# Patient Record
Sex: FEMALE | Race: WHITE | NOT HISPANIC OR LATINO | Employment: FULL TIME | URBAN - METROPOLITAN AREA
[De-identification: names, ages, dates, MRNs, and addresses within clinical notes are randomized per-mention and may not be internally consistent; named-entity substitution may affect disease eponyms.]

---

## 2017-02-13 ENCOUNTER — GENERIC CONVERSION - ENCOUNTER (OUTPATIENT)
Dept: OTHER | Facility: OTHER | Age: 52
End: 2017-02-13

## 2017-02-13 ENCOUNTER — HOSPITAL ENCOUNTER (OUTPATIENT)
Dept: NON INVASIVE DIAGNOSTICS | Facility: CLINIC | Age: 52
Discharge: HOME | End: 2017-02-13
Payer: OTHER GOVERNMENT

## 2017-02-13 ENCOUNTER — ALLSCRIPTS OFFICE VISIT (OUTPATIENT)
Dept: OTHER | Facility: OTHER | Age: 52
End: 2017-02-13

## 2017-02-13 DIAGNOSIS — I35.0 NODULAR CALCIFIC AORTIC VALVE STENOSIS: ICD-10-CM

## 2017-02-13 DIAGNOSIS — I34.1 J.B. BARLOW'S SYNDROME: ICD-10-CM

## 2017-02-13 DIAGNOSIS — E78.49 OTHER HYPERLIPIDEMIA: ICD-10-CM

## 2017-02-13 PROCEDURE — 93306 TTE W/DOPPLER COMPLETE: CPT

## 2017-08-01 DIAGNOSIS — E78.5 HYPERLIPIDEMIA: ICD-10-CM

## 2017-08-04 ENCOUNTER — GENERIC CONVERSION - ENCOUNTER (OUTPATIENT)
Dept: OTHER | Facility: OTHER | Age: 52
End: 2017-08-04

## 2018-01-05 LAB — INR PPP: 1.9 (ref 0.86–1.16)

## 2018-01-09 NOTE — PROGRESS NOTES
REPORT NAME: Patient Visit Summary Report   VISIT DATE: 7/20/2016  VISIT TIME: 10:05 AM EDT  PATIENT NAME: Florette Meckel   MEDICAL RECORD NUMBER: [id number]  SOCIAL SECURITY NUMBER:   YOB: 1965  AGE: 45  REFERRING PHYSICIAN: Kolton Wilkinson MD  SUPERVISING CLINICIAN: Kolton Wilkinson MD  HEALTH CARE PROFESSIONAL: Darío Adam  PATIENT HOME ADDRESS: [address] [place] [address],[address] [place] [address], [place] [address]  PATIENT HOME PHONE: [phone]  DIAGNOSIS 1: Aortic Valve Replacement / 424 1  DIAGNOSIS 2:   DIAGNOSIS 3:   DIAGNOSIS 4:   INR RANGE: 2 - 3  INR GOAL: 2 5  TREATMENT START DATE: 4/14/2009  TREATMENT END DATE:   NEXT VISIT:     NEXT SELF TEST DATE: 8/3/2016    VISIT RESULTS   ENCOUNTER NUMBER:   TEST LOCATION: Home Testing  TEST TYPE: PT Test ([id number])  VISIT TYPE:   CURRENT INR: 2 4 PROTIME:   SPECIMEN COL AND RPT DATE: 7/20/2016 10:05 AM  EDT    VITAL SIGNS  PULSE:  B/P:  WEIGHT:  HEIGHT:  TEMP:     CURRENT ANTICOAGULANT DOSING SCHEDULE  DOSE SIZE: 5mg    ANTICOAGULANT TYPE: WARFARIN  DOSING REGIMEN  Sun       Mon Tues Wed Thurs Fri       Sat  Total/Wk  5         2 5       5         2 5       2 5       5         2 5       25    PATIENT MEDICATION INSTRUCTION: No  PATIENT NUTRITIONAL COUNSELING: No  PATIENT BRUISING INSTRUCTION: No      LAST EDUCATION DATE:     PST DETAILS  PST PATIENT TEST DATE: 7/20/2016  PST PATIENT SUBMISSION DATE: 7/20/2016 8:18 PM EDT  PST INR: 2 4  NEXT PST TEST DATE: 8/3/2016  ASSESSMENT QUESTIONS AND ANSWERS:  PST COMMENT:       PREVIOUS VISIT INFORMATION  VISITDATE   INR Goal  INR   Sun     Mon Tues Wed Thurs   Fri  Sat     Total/wk  7/20/2016   2 5       2 4   5       2 5     5       2 5     2 5     5  2 5     25  7/20/2016   2 5       2 4   5       2 5     5       2 5     2 5     5  2 5     25  7/5/2016    2 5       2 1   5       2 5     5       2 5     2 5     5  2 5     25  6/17/2016   2 5       2 3   5       2 5     5       2 5     2 5 5  2 5     25    ADDITIONAL PREVIOUS VISIT INFORMATION  VISITDATE   PRIMARY RX               DOSE      CrCl  7/20/2016   WARFARIN                 5mg                 7/20/2016   WARFARIN                 5mg                 7/5/2016    WARFARIN                 5mg                 6/17/2016   WARFARIN                 5mg                     OTHER CURRENT MEDICATIONS: WARFARIN      PROGRESS NOTES: 4/19/80, duplicate  serge  PATIENT INSTRUCTIONS:     TEST LOCATION: Home Testing    Electronically signed by:  Tracie Blair on 7/21/2016 at 10:06 AM EDT

## 2018-01-12 NOTE — RESULT NOTES
Verified Results  (1) PT WITH INR [id number] 07:39AM Sarbjit Julien     Test Name Result Flag Reference   INR 2 7 H    Reference Range                     0 9-1 1  Moderate-intensity Warfarin Therapy 2 0-3 0  Higher-intensity Warfarin Therapy   3 0-4 0   PT 26 8 sec H 9 0-11 5   For more information on this test, go to:  [email or web address]/[email or web address]

## 2018-01-12 NOTE — RESULT NOTES
Verified Results  ECHO COMPLETE WITH CONTRAST IF INDICATED [id number] 08:50AM Bahman Ramirez     Test Name Result Flag Reference   ECHO COMPLETE WITH CONTRAST IF INDICATED (Report)     [address]alo Campos [address]  [place], [address]   [phone]     Transthoracic Echocardiogram   2D, M-mode, Doppler, and Color Doppler     Study date: 2017     Patient: Marisol Becerra   MR number: [id number]   Account number: [de-identified]   : 1965   Age: 45 years   Gender: Female   Status: Outpatient   Location: [facility]   Height: 64 in   Weight: 170 lb   BP: 128/ 70 mmHg     Indications: Aortic valve     Diagnoses: I35 9 - Nonrheumatic aortic valve disorder, unspecified     Sonographer: ANTHONY Emery   Primary Physician[facility]   Referring Physician: Bran Torres MD   Group: [facility]   Interpreting Physician: Bran Torres MD     SUMMARY     LEFT VENTRICLE:   Systolic function was normal  Ejection fraction was estimated to be 55 %  There were no regional wall motion abnormalities  Wall thickness was mildly increased  MITRAL VALVE:   There was mild annular calcification  There was mild regurgitation  AORTIC VALVE:   A bioprosthesis was present  It exhibited normal function  There was trace regurgitation  TRICUSPID VALVE:   There was mild regurgitation  PULMONIC VALVE:   There was mild regurgitation  HISTORY: PRIOR HISTORY: Palpitations, AVR, HLD     PROCEDURE: The study was performed in the [address] [place] [address]  This was a routine study  The transthoracic approach was used  The study included complete 2D imaging, M-mode, complete spectral Doppler, and color Doppler  The   heart rate was 68 bpm, at the start of the study  Images were obtained from the parasternal, apical, subcostal, and suprasternal notch acoustic windows  Image quality was adequate       LEFT VENTRICLE: Size was normal  Systolic function was normal  Ejection fraction was estimated to be 55 %  There were no regional wall motion abnormalities  Wall thickness was mildly increased  DOPPLER: The ratio of early ventricular   filling to atrial contraction velocities was within the normal range  The deceleration time of the early transmitral flow velocity was increased  RIGHT VENTRICLE: The size was normal  Systolic function was normal  Wall thickness was normal      LEFT ATRIUM: Size was at the upper limits of normal      RIGHT ATRIUM: Size was normal      MITRAL VALVE: There was mild annular calcification  DOPPLER: There was no evidence for stenosis  There was mild regurgitation  AORTIC VALVE: A bioprosthesis was present  It exhibited normal function  DOPPLER: There was no evidence for stenosis  There was trace regurgitation  TRICUSPID VALVE: The valve structure was normal  There was normal leaflet separation  DOPPLER: The transtricuspid velocity was within the normal range  There was no evidence for stenosis  There was mild regurgitation  Pulmonary artery   systolic pressure was within the normal range  Estimated peak PA pressure was 18 mmHg  PULMONIC VALVE: Leaflets exhibited normal thickness, no calcification, and normal cuspal separation  DOPPLER: The transpulmonic velocity was within the normal range  There was mild regurgitation  PERICARDIUM: There was no pericardial effusion  The pericardium was normal in appearance  AORTA: The root exhibited upper limit of normal size  MEASUREMENT TABLES     2D MEASUREMENTS   Aorta  (Reference normals)   Root diam  37 mm  (--)     SYSTEM MEASUREMENT TABLES     2D   IVSd: 1 1 cm   LA Diam: 3 6 cm   LVIDd: 4 8 cm   LVIDs: 3 6 cm   LVPWd: 1 1 cm     CW   AV Env  Ti: 318 3 ms   AV VTI: 49 5 cm   AV Vmax: 2 3 m/s   AV Vmean: 1 6 m/s   AV maxP 2 mmHg   AV meanP 1 mmHg   TR Vmax: 1 8 m/s   TR maxP 6 mmHg     PW   TERRI (VTI): 2 1 cm2   TERRI Vmax: 2 cm2 HR: 66 BPM   LVCO Dopp: 6 8 L/min   LVOT Env  Ti: 327 6 ms   LVOT VTI: 18 3 cm   LVOT Vmax: 0 8 m/s   LVOT Vmean: 0 6 m/s   LVOT maxP 8 mmHg   LVOT meanP 5 mmHg   LVSV Dopp: 103 1 ml   MV A Ab: 0 9 m/s   MV Dec [place]: 3 8 m/s2   MV DecT: 307 8 ms   MV E Ab: 1 2 m/s   MV E/A Ratio: 1 3   MV PHT: 89 3 ms   MVA By PHT: 2 5 cm2     [facility] Accredited Ech[facility]ography Laboratory     Prepared and electronically signed by     Natty Gorman MD   Signed [id number] 09:58:12

## 2018-01-15 VITALS
SYSTOLIC BLOOD PRESSURE: 126 MMHG | HEIGHT: 64 IN | BODY MASS INDEX: 28.85 KG/M2 | WEIGHT: 169 LBS | DIASTOLIC BLOOD PRESSURE: 78 MMHG | HEART RATE: 60 BPM

## 2018-01-15 NOTE — RESULT NOTES
Verified Results  (1) LIPID PANEL, FASTING 22Aug2016 11:02AM Johnnie Guerra     Test Name Result Flag Reference   CHOLESTEROL, TOTAL 201 mg/dL H 125-200   HDL CHOLESTEROL 49 mg/dL  > OR = 46   TRIGLICERIDES 920 mg/dL H <150   LDL-CHOLESTEROL 111 mg/dL (calc)  <130   Desirable range <100 mg/dL for patients with CHD or  diabetes and <70 mg/dL for diabetic patients with  known heart disease  CHOL/HDLC RATIO 4 1 (calc)  < OR = 5 0   NON HDL CHOLESTEROL 152 mg/dL (calc)     Target for non-HDL cholesterol is 30 mg/dL higher than   LDL cholesterol target       (1) AST (SGOT) 22Aug2016 11:02AM Johnnie Guerra     Test Name Result Flag Reference   AST 18 U/L  10-35     (Q) CBC (H/H, RBC, INDICES, WBC, PLT) 22Aug2016 11:02AM Johnnie Guerra   REPORT COMMENT:  FASTING:YES     Test Name Result Flag Reference   WHITE BLOOD CELL COUNT 5 3 Thousand/uL  3 8-10 8   RED BLOOD CELL COUNT 4 08 Million/uL  3 80-5 10   HEMOGLOBIN 12 6 g/dL  11 7-15 5   HEMATOCRIT 38 3 %  35 0-45 0   MCV 93 7 fL  80 0-100 0   MCH 30 8 pg  27 0-33 0   MCHC 32 9 g/dL  32 0-36 0   RDW 13 6 %  11 0-15 0   PLATELET COUNT 519 Thousand/uL  140-400   MPV 8 3 fL  7 5-11 5     (Q) CBC (H/H, RBC, INDICES, WBC, PLT) 22Aug2016 11:02AM Johnnie Guerra   REPORT COMMENT:  FASTING:YES     Test Name Result Flag Reference   WHITE BLOOD CELL COUNT 5 3 Thousand/uL  3 8-10 8   RED BLOOD CELL COUNT 4 08 Million/uL  3 80-5 10   HEMOGLOBIN 12 6 g/dL  11 7-15 5   HEMATOCRIT 38 3 %  35 0-45 0   MCV 93 7 fL  80 0-100 0   MCH 30 8 pg  27 0-33 0   MCHC 32 9 g/dL  32 0-36 0   RDW 13 6 %  11 0-15 0   PLATELET COUNT 818 Thousand/uL  140-400   MPV 8 3 fL  7 5-11 5

## 2018-01-15 NOTE — PROGRESS NOTES
REPORT NAME: Progress Notes Report  VISIT DATE: 8/4/2017  VISIT TIME: 2:34 PM EDT  PATIENT NAME: Sue Joy   MEDICAL RECORD NUMBER: [id number]  YOB: 1965  AGE: 45  REFERRING PHYSICIAN: Saskia Watson MD  SUPERVISING CLINICIAN: Saskia Watson MD  HEALTH CARE PROVIDER: Lizbeth Adam  PATIENT HOME ADDRESS: [address] [place] [address],[address], [address]  PATIENT HOME PHONE: [phone]  SOCIAL SECURITY NUMBER:   DIAGNOSIS 1: Aortic Valve Replacement / 424 1  DIAGNOSIS 2:   INR RANGE: 2 - 3  INR GOAL: 2 5  TREATMENT START DATE: 4/14/2009  TREATMENT END DATE:   NEXT VISIT:     NEXT SELF TEST DATE: 8/24/2017  VISIT RESULTS  ENCOUNTER NUMBER:   TEST LOCATION: Home Testing  TEST TYPE: PT Test ([id number])  VISIT TYPE: Phone Consult  CURRENT INR: 2 9 PROTIME:   SPECIMEN COL AND RPT DATE: 8/4/2017 2:34 PM EDT  VITAL SIGNS  PULSE:  BP: / WEIGHT:  HEIGHT:  TEMP:   CURRENT ANTICOAGULANT DOSING SCHEDULE  DOSE SIZE: 5mg    ANTICOAGULANT TYPE: WARFARIN  DOSING REGIMEN  Sun       Mon Tues Wed Thurs Fri       Sat  Total/Wk  5         2 5       5         2 5       2 5       5         2 5       25  PATIENT MEDICATION INSTRUCTION: Yes  PATIENT NUTRITIONAL COUNSELING: No  PATIENT BRUISING INSTRUCTION: Yes  LAST EDUCATION DATE:   PST DETAILS  PST PATIENT TEST DATE: 8/4/2017  PST PATIENT SUBMISSION DATE: 8/4/2017 12:44 PM EDT  PST INR: 2 9  NEXT PST TEST DATE: 8/24/2017  ASSESSMENT QUESTIONS AND ANSWERS  PREVIOUS VISIT INFORMATION  VISITDATE  INRGoal INR   Sun    Mon Tues Wed Thurs  Fri    Sat  Total/wk  8/4/2017    2 5     2 9   5      2 5    5      2 5    2 5    5      2 5  25  7/13/2017   2 5     1 6   5      2 5    5      2 5    5      5      2 5  27 5  5      2 5    5      2 5    2 5    5      2 5  25  6/22/2017   2 5     2 7   5      2 5    5      2 5    2 5    5      2 5  25  5/25/2017   2 5     2 1   5      2 5    5      2 5    2 5    5      2 5  25  ADDITIONAL PREVIOUS VISIT INFORMATION  VISITDATE   PRIMARY RX               DOSE      CrCl  8/4/2017    WARFARIN                 5mg  7/13/2017   WARFARIN                 5mg  6/22/2017   WARFARIN                 5mg  5/25/2017   WARFARIN                 5mg  OTHER CURRENT MEDICATIONS:  WARFARIN  PROGRESS NOTES:   PATIENT INSTRUCTIONS: 8/4/17, tc pt, lm am, cont current dose, 5 mg  sun/t/f, 2 5 mg others, rech 3 weeks, 8/24  serge   ---- Additional Instructions entered on 8/29/2017 2:14 PM EDT by Delvis Blair :  8/29/17, karen pt, received fax from [employer], reminder to report  inr asap  serge  TEST LOCATION: Home Testing, ,           ,             INBOUND LAB DATA:  Lab       Lab Value Col Date                 Rpt Date                 Lab  Reference Range  Electronically signed by:  Delvis Blair on 8/29/2017 2:14 PM EDT

## 2018-01-18 NOTE — RESULT NOTES
Message   Please call the patient let her know that her lumbar x-ray was normal however her left hip x-ray shows that she likely has calcific greater trochanteric bursitis-I would recommend she see [facility] orthopedics for evaluation and possible injection  Verified Results  * XR HIP 2+ VIEW LEFT 21Mar2016 04:05PM Dwight Carty Order Number: [id number]     Test Name Result Flag Reference   * XR HIP/PELV 2-3 VWS LEFT (Report)     LEFT HIP     INDICATION: Left hip pain  COMPARISON: None     VIEWS: AP pelvis and 2 coned down views; 3 images     FINDINGS:     There is no fracture or dislocation  No degenerative changes  No lytic or blastic lesions are seen  Calcification is present in the soft tissues adjacent to the greater trochanter  IMPRESSION:     No acute osseous abnormality  Soft tissue calcification may indicate an element of trochanteric bursitis  Workstation performed: [id number]     Signed by:   Lucita Card MD   3/22/16     * XR SPINE LUMBAR MINIMUM 4 VIEWS [id number] 04:05PM Kerwin VELEZ Order Number: [id number]     Test Name Result Flag Reference   XR SPINE LUMBAR MINIMUM 4 VIEWS (Report)     LUMBAR SPINE     INDICATION: Back pain, hip pain  COMPARISON: September 13, 2012     VIEWS: AP, lateral, bilateral oblique and coned down projections; 5 images     FINDINGS:     Alignment is unremarkable  There is no radiographic evidence of acute fracture or destructive osseous lesion  No significant lumbar degenerative change noted  Atherosclerotic vascular calcifications are noted  Visualized soft tissues appear otherwise unremarkable  IMPRESSION:     No acute bony abnormality         Workstation performed: [id number]     Signed by:   Lucita Card MD   3/22/16       Signatures   Electronically signed by : Kelley Singh, [facility]; Mar 22 2016  5:55PM EST                       (Author)

## 2018-01-19 ENCOUNTER — ANTICOAG VISIT (OUTPATIENT)
Dept: CARDIOLOGY CLINIC | Facility: CLINIC | Age: 53
End: 2018-01-19

## 2018-02-09 ENCOUNTER — ANTICOAG VISIT (OUTPATIENT)
Dept: CARDIOLOGY CLINIC | Facility: CLINIC | Age: 53
End: 2018-02-09

## 2018-02-09 LAB — INR PPP: 2.1 (ref 0.86–1.16)

## 2018-03-06 ENCOUNTER — ANTICOAG VISIT (OUTPATIENT)
Dept: CARDIOLOGY CLINIC | Facility: CLINIC | Age: 53
End: 2018-03-06

## 2018-03-06 LAB — INR PPP: 2.4 (ref 0.86–1.16)

## 2018-03-27 ENCOUNTER — ANTICOAG VISIT (OUTPATIENT)
Dept: CARDIOLOGY CLINIC | Facility: CLINIC | Age: 53
End: 2018-03-27

## 2018-03-27 LAB — INR PPP: 1.8 (ref 0.86–1.16)

## 2018-04-04 RX ORDER — WARFARIN SODIUM 5 MG/1
1 TABLET ORAL DAILY
COMMUNITY
Start: 2017-02-06 | End: 2018-07-08 | Stop reason: SDUPTHER

## 2018-04-04 RX ORDER — ALPRAZOLAM 0.25 MG/1
1 TABLET ORAL DAILY PRN
COMMUNITY
Start: 2015-01-05 | End: 2018-11-03 | Stop reason: SDUPTHER

## 2018-04-04 RX ORDER — LORAZEPAM 0.5 MG/1
TABLET ORAL
COMMUNITY
Start: 2016-11-15 | End: 2018-05-17 | Stop reason: ALTCHOICE

## 2018-04-04 RX ORDER — PRAVASTATIN SODIUM 10 MG
1 TABLET ORAL
COMMUNITY
Start: 2015-01-23 | End: 2018-04-11 | Stop reason: SDUPTHER

## 2018-04-05 LAB
AST SERPL-CCNC: 20 U/L (ref 10–35)
CHOLEST SERPL-MCNC: 199 MG/DL
CHOLEST/HDLC SERPL: 3.6 (CALC)
HDLC SERPL-MCNC: 56 MG/DL
LDLC SERPL CALC-MCNC: 116 MG/DL (CALC)
NONHDLC SERPL-MCNC: 143 MG/DL (CALC)
TRIGL SERPL-MCNC: 152 MG/DL

## 2018-04-06 ENCOUNTER — ANTICOAG VISIT (OUTPATIENT)
Dept: CARDIOLOGY CLINIC | Facility: CLINIC | Age: 53
End: 2018-04-06

## 2018-04-06 LAB — INR PPP: 2.1 (ref 0.86–1.16)

## 2018-04-10 ENCOUNTER — OFFICE VISIT (OUTPATIENT)
Dept: CARDIOLOGY CLINIC | Facility: CLINIC | Age: 53
End: 2018-04-10

## 2018-04-10 VITALS
HEIGHT: 64 IN | WEIGHT: 167.6 LBS | BODY MASS INDEX: 28.61 KG/M2 | SYSTOLIC BLOOD PRESSURE: 120 MMHG | DIASTOLIC BLOOD PRESSURE: 70 MMHG

## 2018-04-10 DIAGNOSIS — I49.3 PVC'S (PREMATURE VENTRICULAR CONTRACTIONS): ICD-10-CM

## 2018-04-10 DIAGNOSIS — E78.2 MIXED HYPERLIPIDEMIA: ICD-10-CM

## 2018-04-10 DIAGNOSIS — R00.2 PALPITATIONS: Primary | ICD-10-CM

## 2018-04-10 DIAGNOSIS — I35.9 AORTIC VALVE DISEASE: ICD-10-CM

## 2018-04-10 PROBLEM — F41.9 ANXIETY: Status: ACTIVE | Noted: 2018-04-10

## 2018-04-10 PROCEDURE — 93000 ELECTROCARDIOGRAM COMPLETE: CPT | Performed by: INTERNAL MEDICINE

## 2018-04-10 PROCEDURE — 99213 OFFICE O/P EST LOW 20 MIN: CPT | Performed by: INTERNAL MEDICINE

## 2018-04-10 NOTE — PROGRESS NOTES
Cardiology Follow Up    Juancarlos Wong  1965  [id number]  [address]  [address]    1  Palpitations  POCT ECG   2  Aortic valve disease     3  PVC's (premature ventricular contractions)     4  Mixed hyperlipidemia  AST    Lipid panel       Interval History: She has ongoing palpitations  They feel like stopping and starting  She does get some racing at times  Alprazolam helps it  She denies chest pain or SOB  She denies edema  She does get lightheadedness which she attributes to her allergies    Patient Active Problem List   Diagnosis    Aortic valve disease    Palpitations    PVC's (premature ventricular contractions)    Mixed hyperlipidemia    Anxiety     Past Medical History:   Diagnosis Date    Aortic valve replaced     Hyperlipidemia     Nephrolithiasis      Social History     Social History    Marital status: /Civil Union     Spouse name: N/A    Number of children: N/A    Years of education: N/A     Occupational History    Not on file       Social History Main Topics    Smoking status: Not on file    Smokeless tobacco: Not on file    Alcohol use Not on file    Drug use: Unknown    Sexual activity: Not on file     Other Topics Concern    Not on file     Social History Narrative    No narrative on file      Family History   Problem Relation Age of Onset    Hyperlipidemia Family     COPD Family     Diabetes Family      Past Surgical History:   Procedure Laterality Date    AORTIC VALVE REPLACEMENT         Current Outpatient Prescriptions:     ALPRAZolam (XANAX) 0 25 mg tablet, Take 1 tablet by mouth daily as needed, Disp: , Rfl:     LORazepam (ATIVAN) 0 5 mg tablet, Take by mouth, Disp: , Rfl:     pravastatin (PRAVACHOL) 10 mg tablet, Take 1 tablet by mouth, Disp: , Rfl:     warfarin (COUMADIN) 5 mg tablet, Take 1 tablet by mouth daily, Disp: , Rfl:   Allergies   Allergen Reactions    Erythromycin Nausea Only and Vomiting    Penicillins Hives    Vicodin  [Hydrocodone-Acetaminophen] Dizziness, Nausea Only and Other (See Comments)     Review of Systems:  Review of Systems   Respiratory: Negative for shortness of breath  Cardiovascular: Positive for palpitations  Negative for chest pain and leg swelling  Gastrointestinal: Negative for blood in stool  Genitourinary: Negative for hematuria  Musculoskeletal: Negative for arthralgias and back pain  Physical Exam:  Vitals:    04/10/18 1451   BP: 120/70   BP Location: Left arm   Patient Position: Sitting   Cuff Size: Adult   Weight: 76 kg (167 lb 9 6 oz)   Height: 5' 4" (1 626 m)       Physical Exam   Constitutional: She appears well-developed and well-nourished  No distress  HENT:   Head: Normocephalic and atraumatic  Mouth/Throat: No oropharyngeal exudate  Eyes: Conjunctivae are normal  No scleral icterus  Neck: Neck supple  Normal carotid pulses and no JVD present  Carotid bruit is not present  No thyromegaly present  Cardiovascular: Normal rate and intact distal pulses  Exam reveals no gallop and no friction rub  Murmur (grade 1-2 systolic murmur at the base  ) heard  Good prosthetic valve sounds   Pulmonary/Chest: Breath sounds normal  She has no wheezes  She has no rhonchi  She has no rales  Abdominal: Soft  She exhibits no mass  There is no tenderness  There is no CVA tenderness  Musculoskeletal: She exhibits no edema  Discussion/Summary:  1  Palpitations  Likely due to PVCs  Somewhat bothersome at times but the pattern has remained stable  No specific treatment other than using angulated aches to help her sleep  Reassured that these are benign  2   Aortic valve disease status post bioprosthetic aortic valve in 1997 with mechanical aortic valve replacement in 2008  Normally functioning prosthesis at this time  Patient is on warfarin  3   PVCs  See above comment  4  Hyperlipidemia  Reasonable control on low-dose pravastatin  This is more of a preventative dose and the LDL cholesterol 116 with an HDL cholesterol 56 is acceptable control  Triglycerides are improved in the 150-160 range and had been slightly over 200      Follow-up 1 year  Will check FLP and SGOT one year  Will do CBC and BMP with next Lana Preston MD

## 2018-04-11 DIAGNOSIS — E78.2 MIXED HYPERLIPIDEMIA: Primary | ICD-10-CM

## 2018-04-12 RX ORDER — PRAVASTATIN SODIUM 10 MG
TABLET ORAL
Qty: 90 TABLET | Refills: 3 | Status: SHIPPED | OUTPATIENT
Start: 2018-04-12 | End: 2019-04-01 | Stop reason: SDUPTHER

## 2018-04-27 ENCOUNTER — ANTICOAG VISIT (OUTPATIENT)
Dept: CARDIOLOGY CLINIC | Facility: CLINIC | Age: 53
End: 2018-04-27

## 2018-04-27 DIAGNOSIS — I35.9 AORTIC VALVE DISEASE: ICD-10-CM

## 2018-04-27 LAB — INR PPP: 2.3 (ref 0.86–1.16)

## 2018-05-17 ENCOUNTER — OFFICE VISIT (OUTPATIENT)
Dept: FAMILY MEDICINE CLINIC | Facility: CLINIC | Age: 53
End: 2018-05-17
Payer: OTHER GOVERNMENT

## 2018-05-17 VITALS
HEIGHT: 64 IN | TEMPERATURE: 98.3 F | WEIGHT: 166 LBS | BODY MASS INDEX: 28.34 KG/M2 | DIASTOLIC BLOOD PRESSURE: 70 MMHG | SYSTOLIC BLOOD PRESSURE: 110 MMHG

## 2018-05-17 DIAGNOSIS — K13.0 ANGULAR CHEILITIS: Primary | ICD-10-CM

## 2018-05-17 PROCEDURE — 99213 OFFICE O/P EST LOW 20 MIN: CPT | Performed by: PHYSICIAN ASSISTANT

## 2018-05-17 RX ORDER — CLOTRIMAZOLE AND BETAMETHASONE DIPROPIONATE 10; .64 MG/G; MG/G
CREAM TOPICAL 2 TIMES DAILY
Qty: 15 G | Refills: 0 | Status: SHIPPED | OUTPATIENT
Start: 2018-05-17 | End: 2019-04-09

## 2018-05-17 NOTE — PATIENT INSTRUCTIONS
1  Angular Cheilitis-this is at the right corner of the mouth  Recommend trial of Lotrisone cream applied twice daily to the affected area for the next week    Follow up if symptoms would persist

## 2018-05-17 NOTE — PROGRESS NOTES
Assessment/Plan:  Patient Instructions   1  Angular Cheilitis-this is at the right corner of the mouth  Recommend trial of Lotrisone cream applied twice daily to the affected area for the next week  Follow up if symptoms would persist        Diagnoses and all orders for this visit:    Angular cheilitis  -     clotrimazole-betamethasone (LOTRISONE) 1-0 05 % cream; Apply topically 2 (two) times a day for 14 days          Subjective:   c/o rash near right side of mouth for 4 weeks  Tried OTC products  She thought it was poison  mjs     Patient ID: Bradford Dakin is a 47 y o  female  HPI:  This is a 79-year-old female who presents to the office with a rash at the right corner of her mouth for the past 4 weeks  She has noticed that it is been very gradually spreading over this period of time  She has tried some over-the-counter creams including Neosporin and calamine lotion without much benefit  She initially thought it was related to poison ivy but seemed to continue to spread over several weeks  She is concerned because she is going to be in the wetting for her daughter next weekend  She wants to try to have this cleared up at head of time  The following portions of the patient's history were reviewed and updated as appropriate: allergies, current medications, past family history, past medical history, past social history, past surgical history and problem list     Review of Systems   Constitutional: Negative for chills and fever  HENT: Negative for congestion  Respiratory: Negative for chest tightness and shortness of breath  Cardiovascular: Negative for chest pain and palpitations  Gastrointestinal: Negative for abdominal pain, diarrhea and vomiting  Skin: Positive for rash  Rash at right corner of mouth  Neurological: Negative for dizziness           Objective:      /70   Temp 98 3 °F (36 8 °C)   Ht 5' 4" (1 626 m)   Wt 75 3 kg (166 lb)   BMI 28 49 kg/m² Physical Exam   Constitutional: She appears well-developed and well-nourished  No distress  HENT:   Head: Normocephalic and atraumatic  Eyes: Conjunctivae are normal  Pupils are equal, round, and reactive to light  Cardiovascular: Normal rate, normal heart sounds and intact distal pulses  No murmur heard  Pulmonary/Chest: Effort normal  No respiratory distress  She has no wheezes  Skin:   Had a right corner of the mouth or is some dryness and scaly erythema  There are several small red papules extending laterally from the right angle of the mouth

## 2018-05-23 ENCOUNTER — ANTICOAG VISIT (OUTPATIENT)
Dept: CARDIOLOGY CLINIC | Facility: CLINIC | Age: 53
End: 2018-05-23

## 2018-05-23 DIAGNOSIS — I35.9 AORTIC VALVE DISEASE: ICD-10-CM

## 2018-05-23 LAB — INR PPP: 2.1 (ref 0.86–1.17)

## 2018-06-12 LAB — INR PPP: 3.7 (ref 0.86–1.17)

## 2018-06-13 ENCOUNTER — ANTICOAG VISIT (OUTPATIENT)
Dept: CARDIOLOGY CLINIC | Facility: CLINIC | Age: 53
End: 2018-06-13

## 2018-06-13 DIAGNOSIS — I35.9 AORTIC VALVE DISEASE: ICD-10-CM

## 2018-06-29 ENCOUNTER — ANTICOAG VISIT (OUTPATIENT)
Dept: CARDIOLOGY CLINIC | Facility: CLINIC | Age: 53
End: 2018-06-29

## 2018-06-29 DIAGNOSIS — I35.9 AORTIC VALVE DISEASE: ICD-10-CM

## 2018-06-29 LAB — INR PPP: 1.7 (ref 0.86–1.17)

## 2018-07-08 DIAGNOSIS — Z95.2 S/P AVR (AORTIC VALVE REPLACEMENT): Primary | ICD-10-CM

## 2018-07-09 ENCOUNTER — ANTICOAG VISIT (OUTPATIENT)
Dept: CARDIOLOGY CLINIC | Facility: CLINIC | Age: 53
End: 2018-07-09

## 2018-07-09 DIAGNOSIS — I35.9 AORTIC VALVE DISEASE: ICD-10-CM

## 2018-07-09 LAB — INR PPP: 2.1 (ref 0.86–1.17)

## 2018-07-09 RX ORDER — WARFARIN SODIUM 5 MG/1
TABLET ORAL
Qty: 90 TABLET | Refills: 2 | Status: SHIPPED | OUTPATIENT
Start: 2018-07-09 | End: 2019-08-14 | Stop reason: SDUPTHER

## 2018-07-26 LAB — INR PPP: 3.2 (ref 0.86–1.17)

## 2018-07-27 ENCOUNTER — ANTICOAG VISIT (OUTPATIENT)
Dept: CARDIOLOGY CLINIC | Facility: CLINIC | Age: 53
End: 2018-07-27

## 2018-07-27 DIAGNOSIS — I35.9 AORTIC VALVE DISEASE: ICD-10-CM

## 2018-08-13 ENCOUNTER — ANTICOAG VISIT (OUTPATIENT)
Dept: CARDIOLOGY CLINIC | Facility: CLINIC | Age: 53
End: 2018-08-13

## 2018-08-13 ENCOUNTER — TELEPHONE (OUTPATIENT)
Dept: FAMILY MEDICINE CLINIC | Facility: CLINIC | Age: 53
End: 2018-08-13

## 2018-08-13 DIAGNOSIS — K13.0 ANGULAR CHEILITIS: Primary | ICD-10-CM

## 2018-08-13 DIAGNOSIS — I35.9 AORTIC VALVE DISEASE: ICD-10-CM

## 2018-08-13 LAB — INR PPP: 2.7 (ref 0.86–1.17)

## 2018-08-13 NOTE — TELEPHONE ENCOUNTER
PT SAW MS IN MAY ABOUT A RASH ON HER FACE  IT IS NOT WORKING AND SHE IS ASKING FOR SOMETHING ELSE AS IT IS NOT WORKING  OR DOES HE NEED TO SEE HER AGAIN  PLEASE ADVISE

## 2018-08-14 RX ORDER — KETOCONAZOLE 20 MG/G
CREAM TOPICAL DAILY
Qty: 15 G | Refills: 0 | Status: SHIPPED | OUTPATIENT
Start: 2018-08-14 | End: 2019-04-09

## 2018-08-14 NOTE — TELEPHONE ENCOUNTER
Nizoral cream will be sent in to be used twice daily on the affected area  Would recommend scheduling appointment with Dermatology as well for further evaluation

## 2018-08-14 NOTE — TELEPHONE ENCOUNTER
LMOM for pt  That cream was sent to pharmacy and that she should follow up derm  Pt   To call back if she does not see derm and needs referral

## 2018-08-27 LAB — INR PPP: 2.5 (ref 0.86–1.17)

## 2018-08-28 ENCOUNTER — ANTICOAG VISIT (OUTPATIENT)
Dept: CARDIOLOGY CLINIC | Facility: CLINIC | Age: 53
End: 2018-08-28

## 2018-08-28 DIAGNOSIS — I35.9 AORTIC VALVE DISEASE: ICD-10-CM

## 2018-09-11 ENCOUNTER — ANTICOAG VISIT (OUTPATIENT)
Dept: CARDIOLOGY CLINIC | Facility: CLINIC | Age: 53
End: 2018-09-11

## 2018-09-11 DIAGNOSIS — I35.9 AORTIC VALVE DISEASE: ICD-10-CM

## 2018-09-11 LAB — INR PPP: 2.4 (ref 0.86–1.17)

## 2018-09-28 ENCOUNTER — ANTICOAG VISIT (OUTPATIENT)
Dept: CARDIOLOGY CLINIC | Facility: CLINIC | Age: 53
End: 2018-09-28

## 2018-09-28 DIAGNOSIS — I35.9 AORTIC VALVE DISEASE: ICD-10-CM

## 2018-09-28 LAB — INR PPP: 2.3 (ref 0.86–1.17)

## 2018-10-18 ENCOUNTER — ANTICOAG VISIT (OUTPATIENT)
Dept: CARDIOLOGY CLINIC | Facility: CLINIC | Age: 53
End: 2018-10-18

## 2018-10-18 DIAGNOSIS — I35.9 AORTIC VALVE DISEASE: ICD-10-CM

## 2018-10-18 LAB — INR PPP: 3.5 (ref 0.86–1.17)

## 2018-11-01 ENCOUNTER — ANTICOAG VISIT (OUTPATIENT)
Dept: CARDIOLOGY CLINIC | Facility: CLINIC | Age: 53
End: 2018-11-01

## 2018-11-01 DIAGNOSIS — I35.9 AORTIC VALVE DISEASE: ICD-10-CM

## 2018-11-01 NOTE — PROGRESS NOTES
10/18/18, tc to pt, left message on a answering machine  Hold x 1 day, then decrease dose, 5 mg mon/fri, 2 5 mg other days of the week, inr due 1 week  Pt to call office if any changes in health, medication or bleeding  serge    11/1/18, above description was copied today  11/1/18, tc to pt, left message on answering machine, reminded to test inr asap   serge

## 2018-11-02 ENCOUNTER — ANTICOAG VISIT (OUTPATIENT)
Dept: CARDIOLOGY CLINIC | Facility: CLINIC | Age: 53
End: 2018-11-02

## 2018-11-02 ENCOUNTER — TELEPHONE (OUTPATIENT)
Dept: CARDIOLOGY CLINIC | Facility: CLINIC | Age: 53
End: 2018-11-02

## 2018-11-02 DIAGNOSIS — I35.9 AORTIC VALVE DISEASE: ICD-10-CM

## 2018-11-02 LAB — INR PPP: 1.5 (ref 0.86–1.17)

## 2018-11-02 NOTE — TELEPHONE ENCOUNTER
Phone call from Eugenia Gardiner asking for Alprazolam 0 25 prn  She would like it printed and mailed  Last script I can see is 2015, which she states is correct  She comes yearly and is due back this coming April

## 2018-11-02 NOTE — PROGRESS NOTES
11/2/18, tc to pt, reports she thinks she missed a dose this week  Will take 5 mg today, 2 5 mg sat, 5 mg sun, 2 5 mg mon, inr due luis alberto valentine

## 2018-11-03 DIAGNOSIS — R00.2 PALPITATIONS: Primary | ICD-10-CM

## 2018-11-03 RX ORDER — ALPRAZOLAM 0.25 MG/1
0.25 TABLET ORAL DAILY PRN
Qty: 30 TABLET | Refills: 5 | Status: SHIPPED | OUTPATIENT
Start: 2018-11-03 | End: 2019-08-26 | Stop reason: SDUPTHER

## 2018-11-07 ENCOUNTER — ANTICOAG VISIT (OUTPATIENT)
Dept: CARDIOLOGY CLINIC | Facility: CLINIC | Age: 53
End: 2018-11-07

## 2018-11-07 ENCOUNTER — TELEPHONE (OUTPATIENT)
Dept: CARDIOLOGY CLINIC | Facility: CLINIC | Age: 53
End: 2018-11-07

## 2018-11-07 DIAGNOSIS — I35.9 AORTIC VALVE DISEASE: ICD-10-CM

## 2018-11-07 DIAGNOSIS — Z95.2 S/P AVR (AORTIC VALVE REPLACEMENT): Primary | ICD-10-CM

## 2018-11-07 LAB — INR PPP: 1.9 (ref 0.86–1.17)

## 2018-11-07 NOTE — TELEPHONE ENCOUNTER
Tc to pt, explained recall on home coag u check test strips  [facility]   Will enter inr script

## 2018-11-07 NOTE — PROGRESS NOTES
11/7/18, tc to pt, explained recall on coaguheck strips  Pt will have inr done at lab today  [facility]  Pt was supplied with a script for pt/inr   Will test today   serge

## 2018-11-09 ENCOUNTER — ANTICOAG VISIT (OUTPATIENT)
Dept: CARDIOLOGY CLINIC | Facility: CLINIC | Age: 53
End: 2018-11-09

## 2018-11-09 DIAGNOSIS — I35.9 AORTIC VALVE DISEASE: ICD-10-CM

## 2018-11-09 LAB
INR PPP: 2.1
PROTHROMBIN TIME: 21.2 SEC (ref 9–11.5)

## 2018-12-03 ENCOUNTER — ANTICOAG VISIT (OUTPATIENT)
Dept: CARDIOLOGY CLINIC | Facility: CLINIC | Age: 53
End: 2018-12-03

## 2018-12-03 DIAGNOSIS — I35.9 AORTIC VALVE DISEASE: ICD-10-CM

## 2018-12-03 LAB — INR PPP: 2.2 (ref 0.86–1.17)

## 2018-12-03 NOTE — PROGRESS NOTES
Spoke with patient who is now using new test strips and inr was good at 2 2 - advised patient to continue same dose and she states she tests every two weeks    loco

## 2018-12-21 LAB — INR PPP: 2.6 (ref 0.86–1.17)

## 2018-12-28 ENCOUNTER — ANTICOAG VISIT (OUTPATIENT)
Dept: CARDIOLOGY CLINIC | Facility: CLINIC | Age: 53
End: 2018-12-28

## 2018-12-28 DIAGNOSIS — I35.9 AORTIC VALVE DISEASE: ICD-10-CM

## 2018-12-28 NOTE — PROGRESS NOTES
12/28/18, tc to pt, left message on answering machine, continue current dose, inr due 2 weeks   serge

## 2019-01-18 ENCOUNTER — ANTICOAG VISIT (OUTPATIENT)
Dept: CARDIOLOGY CLINIC | Facility: CLINIC | Age: 54
End: 2019-01-18

## 2019-01-18 DIAGNOSIS — I35.9 AORTIC VALVE DISEASE: ICD-10-CM

## 2019-01-18 LAB — INR PPP: 2.4 (ref 0.86–1.17)

## 2019-01-18 NOTE — PROGRESS NOTES
1/18/19, tc to pt, left message on answering machine, continue current dose, inr due 2- 3 weeks   serge

## 2019-02-05 ENCOUNTER — ANTICOAG VISIT (OUTPATIENT)
Dept: CARDIOLOGY CLINIC | Facility: CLINIC | Age: 54
End: 2019-02-05

## 2019-02-05 DIAGNOSIS — I35.9 AORTIC VALVE DISEASE: ICD-10-CM

## 2019-02-05 LAB — INR PPP: 2.5 (ref 0.86–1.17)

## 2019-02-05 NOTE — PROGRESS NOTES
2/5/19, tc to pt, reports she has been dosing at 5 mg sun/tues/fri, 2 5 mg other days for the past months  Will continue current dose, inr due 2 weeks   serge

## 2019-02-11 ENCOUNTER — TELEPHONE (OUTPATIENT)
Dept: CARDIOLOGY CLINIC | Facility: CLINIC | Age: 54
End: 2019-02-11

## 2019-02-11 NOTE — TELEPHONE ENCOUNTER
Received approval from [employer], they approved alere matl and eqip hm inr q week  Tc to patient , relayed approval for home inr monitoring, by Hao Sims  She understood same  Will have form scanned into her chart

## 2019-03-04 ENCOUNTER — ANTICOAG VISIT (OUTPATIENT)
Dept: CARDIOLOGY CLINIC | Facility: CLINIC | Age: 54
End: 2019-03-04
Payer: OTHER GOVERNMENT

## 2019-03-04 DIAGNOSIS — I35.9 AORTIC VALVE DISEASE: ICD-10-CM

## 2019-03-04 LAB — INR PPP: 3.1 (ref 0.86–1.17)

## 2019-03-04 PROCEDURE — 93793 ANTICOAG MGMT PT WARFARIN: CPT

## 2019-03-15 ENCOUNTER — ANTICOAG VISIT (OUTPATIENT)
Dept: CARDIOLOGY CLINIC | Facility: CLINIC | Age: 54
End: 2019-03-15
Payer: OTHER GOVERNMENT

## 2019-03-15 DIAGNOSIS — I35.9 AORTIC VALVE DISEASE: ICD-10-CM

## 2019-03-15 LAB — INR PPP: 1.6 (ref 0.86–1.17)

## 2019-03-15 PROCEDURE — 93793 ANTICOAG MGMT PT WARFARIN: CPT

## 2019-03-15 NOTE — PROGRESS NOTES
3/15/19, tc to pt, reports she thinks she missed a dose this week  Will take 5 mg today in place of 2 5 mg , then increase dose, 5 mg sun/t/th, 2 5 mg other days of the week, inr due 1 week   serge

## 2019-04-01 DIAGNOSIS — E78.2 MIXED HYPERLIPIDEMIA: ICD-10-CM

## 2019-04-01 RX ORDER — PRAVASTATIN SODIUM 10 MG
TABLET ORAL
Qty: 90 TABLET | Refills: 3 | Status: SHIPPED | OUTPATIENT
Start: 2019-04-01 | End: 2020-03-26

## 2019-04-03 ENCOUNTER — ANTICOAG VISIT (OUTPATIENT)
Dept: CARDIOLOGY CLINIC | Facility: CLINIC | Age: 54
End: 2019-04-03
Payer: OTHER GOVERNMENT

## 2019-04-03 DIAGNOSIS — I35.9 AORTIC VALVE DISEASE: ICD-10-CM

## 2019-04-03 LAB — INR PPP: 2.3 (ref 0.86–1.17)

## 2019-04-03 PROCEDURE — 93793 ANTICOAG MGMT PT WARFARIN: CPT

## 2019-04-09 ENCOUNTER — OFFICE VISIT (OUTPATIENT)
Dept: CARDIOLOGY CLINIC | Facility: CLINIC | Age: 54
End: 2019-04-09
Payer: OTHER GOVERNMENT

## 2019-04-09 VITALS
HEART RATE: 63 BPM | BODY MASS INDEX: 28.68 KG/M2 | RESPIRATION RATE: 16 BRPM | SYSTOLIC BLOOD PRESSURE: 124 MMHG | WEIGHT: 168 LBS | HEIGHT: 64 IN | DIASTOLIC BLOOD PRESSURE: 68 MMHG

## 2019-04-09 DIAGNOSIS — E78.2 MIXED HYPERLIPIDEMIA: ICD-10-CM

## 2019-04-09 DIAGNOSIS — I49.3 PVC'S (PREMATURE VENTRICULAR CONTRACTIONS): ICD-10-CM

## 2019-04-09 DIAGNOSIS — R00.2 PALPITATIONS: Primary | ICD-10-CM

## 2019-04-09 DIAGNOSIS — I35.9 AORTIC VALVE DISEASE: ICD-10-CM

## 2019-04-09 PROCEDURE — 99213 OFFICE O/P EST LOW 20 MIN: CPT | Performed by: INTERNAL MEDICINE

## 2019-04-09 PROCEDURE — 93000 ELECTROCARDIOGRAM COMPLETE: CPT | Performed by: INTERNAL MEDICINE

## 2019-04-25 ENCOUNTER — ANTICOAG VISIT (OUTPATIENT)
Dept: CARDIOLOGY CLINIC | Facility: CLINIC | Age: 54
End: 2019-04-25
Payer: OTHER GOVERNMENT

## 2019-04-25 DIAGNOSIS — I35.9 AORTIC VALVE DISEASE: ICD-10-CM

## 2019-04-25 LAB — INR PPP: 2.5 (ref 0.86–1.17)

## 2019-04-25 PROCEDURE — 93793 ANTICOAG MGMT PT WARFARIN: CPT

## 2019-05-29 ENCOUNTER — ANTICOAG VISIT (OUTPATIENT)
Dept: CARDIOLOGY CLINIC | Facility: CLINIC | Age: 54
End: 2019-05-29

## 2019-05-29 DIAGNOSIS — I35.9 AORTIC VALVE DISEASE: ICD-10-CM

## 2019-05-29 LAB — INR PPP: 3.8 (ref 0.86–1.17)

## 2019-06-18 ENCOUNTER — ANTICOAG VISIT (OUTPATIENT)
Dept: CARDIOLOGY CLINIC | Facility: CLINIC | Age: 54
End: 2019-06-18
Payer: OTHER GOVERNMENT

## 2019-06-18 DIAGNOSIS — I35.9 AORTIC VALVE DISEASE: ICD-10-CM

## 2019-06-18 LAB — INR PPP: 3 (ref 0.86–1.17)

## 2019-06-18 PROCEDURE — 93793 ANTICOAG MGMT PT WARFARIN: CPT

## 2019-07-08 ENCOUNTER — ANTICOAG VISIT (OUTPATIENT)
Dept: CARDIOLOGY CLINIC | Facility: CLINIC | Age: 54
End: 2019-07-08

## 2019-07-08 DIAGNOSIS — I35.9 AORTIC VALVE DISEASE: ICD-10-CM

## 2019-07-08 LAB — INR PPP: 2.4 (ref 0.84–1.19)

## 2019-08-04 LAB — INR PPP: 2.1 (ref 0.84–1.19)

## 2019-08-05 ENCOUNTER — ANTICOAG VISIT (OUTPATIENT)
Dept: CARDIOLOGY CLINIC | Facility: CLINIC | Age: 54
End: 2019-08-05
Payer: OTHER GOVERNMENT

## 2019-08-05 DIAGNOSIS — I35.9 AORTIC VALVE DISEASE: ICD-10-CM

## 2019-08-05 PROCEDURE — 93793 ANTICOAG MGMT PT WARFARIN: CPT

## 2019-08-05 NOTE — PROGRESS NOTES
8/45/19, tc to pt, left message on answering machine, continue current dose, inr due 2 weeks, pt to call if any questions   serge

## 2019-08-14 DIAGNOSIS — Z95.2 S/P AVR (AORTIC VALVE REPLACEMENT): ICD-10-CM

## 2019-08-14 RX ORDER — WARFARIN SODIUM 5 MG/1
TABLET ORAL
Qty: 90 TABLET | Refills: 2 | Status: SHIPPED | OUTPATIENT
Start: 2019-08-14 | End: 2020-10-24

## 2019-08-26 ENCOUNTER — ANTICOAG VISIT (OUTPATIENT)
Dept: CARDIOLOGY CLINIC | Facility: CLINIC | Age: 54
End: 2019-08-26
Payer: OTHER GOVERNMENT

## 2019-08-26 ENCOUNTER — TELEPHONE (OUTPATIENT)
Dept: CARDIOLOGY CLINIC | Facility: CLINIC | Age: 54
End: 2019-08-26

## 2019-08-26 DIAGNOSIS — R00.2 PALPITATIONS: ICD-10-CM

## 2019-08-26 DIAGNOSIS — I35.9 AORTIC VALVE DISEASE: ICD-10-CM

## 2019-08-26 LAB — INR PPP: 2.9 (ref 0.84–1.19)

## 2019-08-26 PROCEDURE — 93793 ANTICOAG MGMT PT WARFARIN: CPT

## 2019-08-26 RX ORDER — ALPRAZOLAM 0.25 MG/1
0.25 TABLET ORAL DAILY PRN
Qty: 30 TABLET | Refills: 5 | Status: SHIPPED | OUTPATIENT
Start: 2019-08-26 | End: 2019-08-26 | Stop reason: SDUPTHER

## 2019-08-26 RX ORDER — ALPRAZOLAM 0.25 MG/1
0.25 TABLET ORAL DAILY PRN
Qty: 30 TABLET | Refills: 5 | Status: SHIPPED | OUTPATIENT
Start: 2019-08-26 | End: 2020-04-11 | Stop reason: SDUPTHER

## 2019-08-26 NOTE — TELEPHONE ENCOUNTER
Phone call to pt, regarding inr results  Pt is requesting new prescription on xanax 0 25 mg daily, she uses prn, last prescription was 2018  Noted last script was printed  she states she takes only prn please advise  thanks

## 2019-09-23 LAB — INR PPP: 2.1 (ref 0.84–1.19)

## 2019-09-24 ENCOUNTER — ANTICOAG VISIT (OUTPATIENT)
Dept: CARDIOLOGY CLINIC | Facility: CLINIC | Age: 54
End: 2019-09-24
Payer: OTHER GOVERNMENT

## 2019-09-24 DIAGNOSIS — I35.9 AORTIC VALVE DISEASE: ICD-10-CM

## 2019-09-24 PROCEDURE — 93793 ANTICOAG MGMT PT WARFARIN: CPT

## 2019-09-24 NOTE — PROGRESS NOTES
9/24/19, tc to pt, left message on answering machine, continue current dose, inr due 2 weeks  Pt to call if any questions   serge

## 2019-10-23 ENCOUNTER — TELEPHONE (OUTPATIENT)
Dept: CARDIOLOGY CLINIC | Facility: CLINIC | Age: 54
End: 2019-10-23

## 2019-10-23 NOTE — TELEPHONE ENCOUNTER
Phone call to patient's cell, left message, she is overdue for pt/inr testing, requested she test inr asap  Pt to call if any questions   serge

## 2019-10-24 ENCOUNTER — ANTICOAG VISIT (OUTPATIENT)
Dept: CARDIOLOGY CLINIC | Facility: CLINIC | Age: 54
End: 2019-10-24
Payer: OTHER GOVERNMENT

## 2019-10-24 DIAGNOSIS — I35.9 AORTIC VALVE DISEASE: ICD-10-CM

## 2019-10-24 LAB — INR PPP: 2.8 (ref 0.84–1.19)

## 2019-10-24 PROCEDURE — 93793 ANTICOAG MGMT PT WARFARIN: CPT | Performed by: INTERNAL MEDICINE

## 2019-10-24 NOTE — PROGRESS NOTES
10/24/19, tc to pt, left message on answering machine, continue current dose, inr due 2 weeks   serge

## 2019-11-20 LAB — INR PPP: 2.6 (ref 0.84–1.19)

## 2019-11-21 ENCOUNTER — ANTICOAG VISIT (OUTPATIENT)
Dept: CARDIOLOGY CLINIC | Facility: CLINIC | Age: 54
End: 2019-11-21
Payer: OTHER GOVERNMENT

## 2019-11-21 DIAGNOSIS — I35.9 AORTIC VALVE DISEASE: ICD-10-CM

## 2019-11-21 PROCEDURE — 93793 ANTICOAG MGMT PT WARFARIN: CPT | Performed by: INTERNAL MEDICINE

## 2019-11-21 NOTE — PROGRESS NOTES
11/21/19, tc to pt, left message on answering machine, will continue current dose, inr due 2 weeks   serge

## 2019-12-09 ENCOUNTER — OFFICE VISIT (OUTPATIENT)
Dept: FAMILY MEDICINE CLINIC | Facility: CLINIC | Age: 54
End: 2019-12-09
Payer: OTHER GOVERNMENT

## 2019-12-09 VITALS
RESPIRATION RATE: 16 BRPM | DIASTOLIC BLOOD PRESSURE: 78 MMHG | TEMPERATURE: 97.7 F | SYSTOLIC BLOOD PRESSURE: 122 MMHG | HEIGHT: 64 IN | HEART RATE: 62 BPM | WEIGHT: 167 LBS | BODY MASS INDEX: 28.51 KG/M2

## 2019-12-09 DIAGNOSIS — J06.9 ACUTE UPPER RESPIRATORY INFECTION: Primary | ICD-10-CM

## 2019-12-09 PROBLEM — I77.89 AORTIC ROOT ENLARGEMENT (HCC): Status: ACTIVE | Noted: 2017-02-13

## 2019-12-09 PROCEDURE — 99214 OFFICE O/P EST MOD 30 MIN: CPT | Performed by: PHYSICIAN ASSISTANT

## 2019-12-09 RX ORDER — DOXYCYCLINE 100 MG/1
100 TABLET ORAL 2 TIMES DAILY
Qty: 20 TABLET | Refills: 0 | Status: SHIPPED | OUTPATIENT
Start: 2019-12-09 | End: 2019-12-19

## 2019-12-09 NOTE — PROGRESS NOTES
Assessment and Plan:    Problem List Items Addressed This Visit        Respiratory    Acute upper respiratory infection - Primary     Antibiotic as directed  Continue nasocort, increased fluids  Coricidin HBP  Relevant Medications    doxycycline (ADOXA) 100 MG tablet                 Diagnoses and all orders for this visit:    Acute upper respiratory infection  -     doxycycline (ADOXA) 100 MG tablet; Take 1 tablet (100 mg total) by mouth 2 (two) times a day for 10 days              Subjective:      Patient ID: Andressa Jaimes is a 53 y o  female  CC:    Chief Complaint   Patient presents with    Chest cold     ongoing for ten days  Pt states mainly cough and headache  HPI:    Pt  here today as she has had cold symptoms now for 10 days  Cough seems nonproductive  Headache bilateral sinuses  She has been using Mucinex DM without relief leaf  No sore throat no shortness of breath  She has not been sick in many years  No ear pain no runny nose no postnasal drip no nausea vomiting diarrhea or fever  She is status post aortic valve replacement and does follow regularly with Cardiology  The following portions of the patient's history were reviewed and updated as appropriate: allergies, current medications, past family history, past medical history, past social history, past surgical history and problem list       Review of Systems   Constitutional: Positive for fatigue  HENT: Positive for congestion  Eyes: Negative  Respiratory: Positive for cough  Cardiovascular: Negative  Gastrointestinal: Negative  Endocrine: Negative  Genitourinary: Negative  Musculoskeletal: Negative  Skin: Negative  Allergic/Immunologic: Negative  Neurological: Positive for headaches  Hematological: Negative  Psychiatric/Behavioral: Negative            Data to review:       Objective:    Vitals:    12/09/19 1212   BP: 122/78   BP Location: Left arm   Patient Position: Sitting   Cuff Size: Adult   Pulse: 62   Resp: 16   Temp: 97 7 °F (36 5 °C)   TempSrc: Tympanic   Weight: 75 8 kg (167 lb)   Height: 5' 4" (1 626 m)        Physical Exam   Constitutional: She is oriented to person, place, and time  She appears well-developed and well-nourished  No distress  HENT:   Head: Normocephalic and atraumatic  Right Ear: Hearing, tympanic membrane, external ear and ear canal normal    Left Ear: Hearing, tympanic membrane, external ear and ear canal normal    Nose: Nose normal    Mouth/Throat: Uvula is midline and oropharynx is clear and moist    Eyes: Conjunctivae are normal  Right eye exhibits no discharge  Left eye exhibits no discharge  Neck: Neck supple  Carotid bruit is not present  Cardiovascular: Normal rate, regular rhythm and normal heart sounds  Exam reveals no gallop and no friction rub  No murmur heard  Pulmonary/Chest: Effort normal and breath sounds normal  No respiratory distress  She has no wheezes  She has no rales  Lymphadenopathy:     She has cervical adenopathy  Right cervical: Superficial cervical adenopathy present  Left cervical: Superficial cervical adenopathy present  Neurological: She is alert and oriented to person, place, and time  Skin: Skin is warm and dry  She is not diaphoretic  Psychiatric: She has a normal mood and affect  Judgment normal    Nursing note and vitals reviewed  BMI Counseling: Body mass index is 28 67 kg/m²  The BMI is above normal  Nutrition recommendations include decreasing portion sizes  Exercise recommendations include exercising 3-5 times per week  No pharmacotherapy was ordered

## 2019-12-09 NOTE — PATIENT INSTRUCTIONS
Problem List Items Addressed This Visit        Respiratory    Acute upper respiratory infection - Primary     Antibiotic as directed  Continue nasocort, increased fluids  Coricidin HBP            Relevant Medications    doxycycline (ADOXA) 100 MG tablet

## 2019-12-13 ENCOUNTER — ANTICOAG VISIT (OUTPATIENT)
Dept: CARDIOLOGY CLINIC | Facility: CLINIC | Age: 54
End: 2019-12-13
Payer: OTHER GOVERNMENT

## 2019-12-13 DIAGNOSIS — I35.9 AORTIC VALVE DISEASE: ICD-10-CM

## 2019-12-13 LAB — INR PPP: 2.1 (ref 0.84–1.19)

## 2019-12-13 PROCEDURE — 93793 ANTICOAG MGMT PT WARFARIN: CPT | Performed by: INTERNAL MEDICINE

## 2019-12-13 NOTE — PROGRESS NOTES
12/13/19, tc to pt, left message on answering machine, continue current dose, inr due 3 weeks   serge

## 2020-01-07 LAB — INR PPP: 3.1 (ref 0.84–1.19)

## 2020-01-08 ENCOUNTER — ANTICOAG VISIT (OUTPATIENT)
Dept: CARDIOLOGY CLINIC | Facility: CLINIC | Age: 55
End: 2020-01-08
Payer: OTHER GOVERNMENT

## 2020-01-08 DIAGNOSIS — I35.9 AORTIC VALVE DISEASE: ICD-10-CM

## 2020-01-08 PROCEDURE — 93793 ANTICOAG MGMT PT WARFARIN: CPT | Performed by: INTERNAL MEDICINE

## 2020-01-08 NOTE — PROGRESS NOTES
1/8/20, tc to pt, left message on answering machine, continue current dose, inr due 1 weeks  Pt to call if any changes in health, medication or bleeding   serge

## 2020-01-24 ENCOUNTER — ANTICOAG VISIT (OUTPATIENT)
Dept: CARDIOLOGY CLINIC | Facility: CLINIC | Age: 55
End: 2020-01-24
Payer: OTHER GOVERNMENT

## 2020-01-24 DIAGNOSIS — I35.9 AORTIC VALVE DISEASE: ICD-10-CM

## 2020-01-24 LAB — INR PPP: 1.8 (ref 0.84–1.19)

## 2020-01-24 PROCEDURE — 93793 ANTICOAG MGMT PT WARFARIN: CPT | Performed by: INTERNAL MEDICINE

## 2020-01-24 NOTE — PROGRESS NOTES
1/24/20, tc to pt, reports she has been taking 5 mg sun/tues/fri, 2 5 mg other days of the week, will continue current dose, inr due 10 days   serge

## 2020-02-17 ENCOUNTER — ANTICOAG VISIT (OUTPATIENT)
Dept: CARDIOLOGY CLINIC | Facility: CLINIC | Age: 55
End: 2020-02-17

## 2020-02-17 DIAGNOSIS — I35.9 AORTIC VALVE DISEASE: ICD-10-CM

## 2020-02-17 LAB — INR PPP: 3.5 (ref 0.84–1.19)

## 2020-02-17 NOTE — PROGRESS NOTES
Pt starte new multivitamin on 2/15 will hold x1 day then 5 tues and Friday 2 5 others will recheck on 2/24  Pt does home monitoring

## 2020-02-25 PROBLEM — Z01.419 ENCOUNTER FOR GYNECOLOGICAL EXAMINATION: Status: ACTIVE | Noted: 2020-02-25

## 2020-02-25 PROBLEM — J06.9 ACUTE UPPER RESPIRATORY INFECTION: Status: RESOLVED | Noted: 2019-12-09 | Resolved: 2020-02-25

## 2020-02-26 ENCOUNTER — PROCEDURE VISIT (OUTPATIENT)
Dept: FAMILY MEDICINE CLINIC | Facility: CLINIC | Age: 55
End: 2020-02-26
Payer: OTHER GOVERNMENT

## 2020-02-26 ENCOUNTER — TELEPHONE (OUTPATIENT)
Dept: ADMINISTRATIVE | Facility: OTHER | Age: 55
End: 2020-02-26

## 2020-02-26 VITALS
HEART RATE: 68 BPM | BODY MASS INDEX: 28.79 KG/M2 | WEIGHT: 168.6 LBS | TEMPERATURE: 97.5 F | DIASTOLIC BLOOD PRESSURE: 70 MMHG | HEIGHT: 64 IN | RESPIRATION RATE: 16 BRPM | SYSTOLIC BLOOD PRESSURE: 118 MMHG

## 2020-02-26 DIAGNOSIS — Z12.39 BREAST CANCER SCREENING: ICD-10-CM

## 2020-02-26 DIAGNOSIS — Z01.419 ENCOUNTER FOR GYNECOLOGICAL EXAMINATION: Primary | ICD-10-CM

## 2020-02-26 DIAGNOSIS — E78.5 HYPERLIPIDEMIA, UNSPECIFIED HYPERLIPIDEMIA TYPE: ICD-10-CM

## 2020-02-26 DIAGNOSIS — Z00.00 HEALTHCARE MAINTENANCE: ICD-10-CM

## 2020-02-26 PROCEDURE — 99396 PREV VISIT EST AGE 40-64: CPT | Performed by: PHYSICIAN ASSISTANT

## 2020-02-26 PROCEDURE — G0145 SCR C/V CYTO,THINLAYER,RESCR: HCPCS | Performed by: PHYSICIAN ASSISTANT

## 2020-02-26 PROCEDURE — 87624 HPV HI-RISK TYP POOLED RSLT: CPT | Performed by: PHYSICIAN ASSISTANT

## 2020-02-26 NOTE — TELEPHONE ENCOUNTER
----- Message from [facility] sent at 2/25/2020  2:42 PM EST -----  Regarding: pap smear  Contact: [phone]  02/25/20 2:42 PM    Hello, our patient Jacy Harrington has had Pap Smear (HPV) aka Cervical Cancer Screening completed/performed  Please assist in updating the patient chart by pulling the Care Everywhere (CE) document  The date of service is 5/14/2013       Thank you,  [facility]  [facility]

## 2020-02-26 NOTE — PATIENT INSTRUCTIONS
Problem List Items Addressed This Visit        Other    Hyperlipemia     Reprinted orders from Dr Bakari Smith for fasting blood work from 4/2019  Encounter for gynecological examination - Primary     Mammo ordered for August  Pap with HPV done today  Call/card with results  Relevant Orders    Liquid-based pap, screening    Healthcare maintenance     Pt is on long term coumadin and would like to discuss colonoscopy with her cardiologist at her next visit  Call if she needs a GI order or decides to go with FIT or cologaurd              Other Visit Diagnoses     Breast cancer screening        Relevant Orders    Mammo screening bilateral w 3d & cad

## 2020-02-26 NOTE — ASSESSMENT & PLAN NOTE
Pt is on long term coumadin and would like to discuss colonoscopy with her cardiologist at her next visit  Call if she needs a GI order or decides to go with FIT or cologaurd

## 2020-02-26 NOTE — PROGRESS NOTES
Assessment and Plan:    Problem List Items Addressed This Visit        Other    Hyperlipemia     Reprinted orders from Dr Bertrand Davila for fasting blood work from 4/2019  Encounter for gynecological examination - Primary     Mammo ordered for August  Pap with HPV done today  Call/card with results  Relevant Orders    Liquid-based pap, screening    Healthcare maintenance     Pt is on long term coumadin and would like to discuss colonoscopy with her cardiologist at her next visit  Call if she needs a GI order or decides to go with FIT or cologaurd  Other Visit Diagnoses     Breast cancer screening        Relevant Orders    Mammo screening bilateral w 3d & cad                 Diagnoses and all orders for this visit:    Encounter for gynecological examination  -     Liquid-based pap, screening    Breast cancer screening  -     Mammo screening bilateral w 3d & cad; Future    Hyperlipidemia, unspecified hyperlipidemia type    Healthcare maintenance              Subjective:      Patient ID: Lay Cheatham is a 53 y o  female  CC:    Chief Complaint   Patient presents with    Gynecologic Exam       HPI:    Pt  Is here today for her annual GYN examination  Urinary c/o-no*  Menstrual c/o-no, in menopause  LMP-8/2018  [id number]  [id number]  Birth control method-menopause  Sexually active-no  Last pap-3/16/2016  Hx abnormal pap/outcome/procedures-no  Hx abnormal mammo-no        The following portions of the patient's history were reviewed and updated as appropriate: allergies, current medications, past family history, past medical history, past social history, past surgical history and problem list       Review of Systems   Genitourinary: Negative  Negative for decreased urine volume, difficulty urinating, dyspareunia, dysuria, enuresis, flank pain, frequency, genital sores, hematuria, menstrual problem, pelvic pain, urgency, vaginal bleeding, vaginal discharge and vaginal pain     Skin: No c/o breast pain, d/c, lumps or bumps or other breast issues  Data to review:       Objective:    Vitals:    02/26/20 0830   BP: 118/70   BP Location: Left arm   Patient Position: Sitting   Cuff Size: Adult   Pulse: 68   Resp: 16   Temp: 97 5 °F (36 4 °C)   TempSrc: Temporal   Weight: 76 5 kg (168 lb 9 6 oz)   Height: 5' 4" (1 626 m)        Physical Exam   Constitutional: She is oriented to person, place, and time  She appears well-developed and well-nourished  No distress  HENT:   Head: Normocephalic and atraumatic  Eyes: Conjunctivae are normal  Right eye exhibits no discharge  Left eye exhibits no discharge  Cardiovascular: Normal rate  Pulmonary/Chest: Effort normal  Right breast exhibits no inverted nipple, no mass, no nipple discharge, no skin change and no tenderness  Left breast exhibits no inverted nipple, no mass, no nipple discharge, no skin change and no tenderness  No breast tenderness, discharge or bleeding  Breasts are symmetrical    Abdominal: Soft  Genitourinary: Vagina normal and uterus normal  No breast tenderness, discharge or bleeding  Pelvic exam was performed with patient prone  No labial fusion  There is no rash, tenderness, lesion or injury on the right labia  There is no rash, tenderness, lesion or injury on the left labia  Uterus is not deviated, not enlarged, not fixed and not tender  Cervix exhibits no motion tenderness, no discharge and no friability  Right adnexum displays no mass, no tenderness and no fullness  Left adnexum displays no mass, no tenderness and no fullness  No erythema, tenderness or bleeding in the vagina  No foreign body in the vagina  No signs of injury around the vagina  No vaginal discharge found  Genitourinary Comments: Pap with HPV done today  Os multi parous  Large ectropion noted  Musculoskeletal: She exhibits no edema  Neurological: She is alert and oriented to person, place, and time  Skin: Skin is warm and dry   She is not diaphoretic  Psychiatric: She has a normal mood and affect  Judgment normal    Nursing note and vitals reviewed

## 2020-02-27 LAB
HPV HR 12 DNA CVX QL NAA+PROBE: NEGATIVE
HPV16 DNA CVX QL NAA+PROBE: NEGATIVE
HPV18 DNA CVX QL NAA+PROBE: NEGATIVE

## 2020-03-03 LAB
LAB AP GYN PRIMARY INTERPRETATION: NORMAL
LAB AP LMP: NORMAL
Lab: NORMAL

## 2020-03-13 ENCOUNTER — ANTICOAG VISIT (OUTPATIENT)
Dept: CARDIOLOGY CLINIC | Facility: CLINIC | Age: 55
End: 2020-03-13
Payer: OTHER GOVERNMENT

## 2020-03-13 DIAGNOSIS — I35.9 AORTIC VALVE DISEASE: ICD-10-CM

## 2020-03-13 LAB — INR PPP: 2.8 (ref 0.84–1.19)

## 2020-03-13 PROCEDURE — 93793 ANTICOAG MGMT PT WARFARIN: CPT | Performed by: INTERNAL MEDICINE

## 2020-03-13 NOTE — PROGRESS NOTES
3/13/20, tc to pt, left message on answering machine, continue current dose, inr due 2 weeks   serge

## 2020-03-25 LAB — INR PPP: 3.6 (ref 0.84–1.19)

## 2020-03-26 ENCOUNTER — ANTICOAG VISIT (OUTPATIENT)
Dept: CARDIOLOGY CLINIC | Facility: CLINIC | Age: 55
End: 2020-03-26

## 2020-03-26 DIAGNOSIS — E78.2 MIXED HYPERLIPIDEMIA: ICD-10-CM

## 2020-03-26 DIAGNOSIS — I35.9 AORTIC VALVE DISEASE: ICD-10-CM

## 2020-03-26 RX ORDER — PRAVASTATIN SODIUM 10 MG
TABLET ORAL
Qty: 90 TABLET | Refills: 3 | Status: SHIPPED | OUTPATIENT
Start: 2020-03-26 | End: 2021-03-15 | Stop reason: SDUPTHER

## 2020-03-26 NOTE — PROGRESS NOTES
Pt states has been taking a lot of ibuprophen for back states that's why she feels elevated  Pt held yesterday PM due to her reading will take 5 Friday 2 5 others receheck in 2 weeks   Pt does home monitoring

## 2020-04-08 LAB
ALBUMIN SERPL-MCNC: 4.5 G/DL (ref 3.6–5.1)
ALBUMIN/GLOB SERPL: 2 (CALC) (ref 1–2.5)
ALP SERPL-CCNC: 49 U/L (ref 37–153)
ALT SERPL-CCNC: 19 U/L (ref 6–29)
AST SERPL-CCNC: 22 U/L (ref 10–35)
BASOPHILS # BLD AUTO: 59 CELLS/UL (ref 0–200)
BASOPHILS NFR BLD AUTO: 1.3 %
BILIRUB SERPL-MCNC: 1.4 MG/DL (ref 0.2–1.2)
BUN SERPL-MCNC: 12 MG/DL (ref 7–25)
BUN/CREAT SERPL: ABNORMAL (CALC) (ref 6–22)
CALCIUM SERPL-MCNC: 9.4 MG/DL (ref 8.6–10.4)
CHLORIDE SERPL-SCNC: 103 MMOL/L (ref 98–110)
CHOLEST SERPL-MCNC: 219 MG/DL
CHOLEST/HDLC SERPL: 3.8 (CALC)
CO2 SERPL-SCNC: 29 MMOL/L (ref 20–32)
CREAT SERPL-MCNC: 0.69 MG/DL (ref 0.5–1.05)
EOSINOPHIL # BLD AUTO: 302 CELLS/UL (ref 15–500)
EOSINOPHIL NFR BLD AUTO: 6.7 %
ERYTHROCYTE [DISTWIDTH] IN BLOOD BY AUTOMATED COUNT: 12.6 % (ref 11–15)
GLOBULIN SER CALC-MCNC: 2.3 G/DL (CALC) (ref 1.9–3.7)
GLUCOSE SERPL-MCNC: 91 MG/DL (ref 65–99)
HCT VFR BLD AUTO: 37.2 % (ref 35–45)
HDLC SERPL-MCNC: 58 MG/DL
HGB BLD-MCNC: 12.6 G/DL (ref 11.7–15.5)
LDLC SERPL CALC-MCNC: 122 MG/DL (CALC)
LYMPHOCYTES # BLD AUTO: 1341 CELLS/UL (ref 850–3900)
LYMPHOCYTES NFR BLD AUTO: 29.8 %
MCH RBC QN AUTO: 31.3 PG (ref 27–33)
MCHC RBC AUTO-ENTMCNC: 33.9 G/DL (ref 32–36)
MCV RBC AUTO: 92.3 FL (ref 80–100)
MONOCYTES # BLD AUTO: 392 CELLS/UL (ref 200–950)
MONOCYTES NFR BLD AUTO: 8.7 %
NEUTROPHILS # BLD AUTO: 2408 CELLS/UL (ref 1500–7800)
NEUTROPHILS NFR BLD AUTO: 53.5 %
NONHDLC SERPL-MCNC: 161 MG/DL (CALC)
PLATELET # BLD AUTO: 277 THOUSAND/UL (ref 140–400)
PMV BLD REES-ECKER: 9.6 FL (ref 7.5–12.5)
POTASSIUM SERPL-SCNC: 3.9 MMOL/L (ref 3.5–5.3)
PROT SERPL-MCNC: 6.8 G/DL (ref 6.1–8.1)
RBC # BLD AUTO: 4.03 MILLION/UL (ref 3.8–5.1)
SL AMB EGFR AFRICAN AMERICAN: 114 ML/MIN/1.73M2
SL AMB EGFR NON AFRICAN AMERICAN: 98 ML/MIN/1.73M2
SODIUM SERPL-SCNC: 139 MMOL/L (ref 135–146)
TRIGL SERPL-MCNC: 240 MG/DL
WBC # BLD AUTO: 4.5 THOUSAND/UL (ref 3.8–10.8)

## 2020-04-11 DIAGNOSIS — R00.2 PALPITATIONS: ICD-10-CM

## 2020-04-13 RX ORDER — ALPRAZOLAM 0.25 MG/1
0.25 TABLET ORAL SEE ADMIN INSTRUCTIONS
Qty: 45 TABLET | Refills: 2 | Status: SHIPPED | OUTPATIENT
Start: 2020-04-13 | End: 2022-06-14 | Stop reason: SDUPTHER

## 2020-04-15 LAB — INR PPP: 2.2 (ref 0.84–1.19)

## 2020-04-16 ENCOUNTER — ANTICOAG VISIT (OUTPATIENT)
Dept: CARDIOLOGY CLINIC | Facility: CLINIC | Age: 55
End: 2020-04-16
Payer: OTHER GOVERNMENT

## 2020-04-16 DIAGNOSIS — I35.9 AORTIC VALVE DISEASE: ICD-10-CM

## 2020-04-16 DIAGNOSIS — Z95.2 H/O AORTIC VALVE REPLACEMENT: ICD-10-CM

## 2020-04-16 PROCEDURE — 93793 ANTICOAG MGMT PT WARFARIN: CPT | Performed by: INTERNAL MEDICINE

## 2020-04-30 ENCOUNTER — OFFICE VISIT (OUTPATIENT)
Dept: FAMILY MEDICINE CLINIC | Facility: CLINIC | Age: 55
End: 2020-04-30
Payer: OTHER GOVERNMENT

## 2020-04-30 VITALS
WEIGHT: 166 LBS | DIASTOLIC BLOOD PRESSURE: 76 MMHG | SYSTOLIC BLOOD PRESSURE: 118 MMHG | HEART RATE: 76 BPM | BODY MASS INDEX: 28.34 KG/M2 | HEIGHT: 64 IN

## 2020-04-30 DIAGNOSIS — R10.9 RIGHT FLANK PAIN: ICD-10-CM

## 2020-04-30 DIAGNOSIS — R10.11 RUQ PAIN: ICD-10-CM

## 2020-04-30 DIAGNOSIS — Z12.11 COLON CANCER SCREENING: Primary | ICD-10-CM

## 2020-04-30 DIAGNOSIS — R31.29 OTHER MICROSCOPIC HEMATURIA: ICD-10-CM

## 2020-04-30 DIAGNOSIS — Z87.442 HISTORY OF NEPHROLITHIASIS: ICD-10-CM

## 2020-04-30 LAB
SL AMB  POCT GLUCOSE, UA: ABNORMAL
SL AMB LEUKOCYTE ESTERASE,UA: ABNORMAL
SL AMB POCT BILIRUBIN,UA: ABNORMAL
SL AMB POCT BLOOD,UA: ABNORMAL
SL AMB POCT CLARITY,UA: CLEAR
SL AMB POCT COLOR,UA: ABNORMAL
SL AMB POCT KETONES,UA: ABNORMAL
SL AMB POCT NITRITE,UA: ABNORMAL
SL AMB POCT PH,UA: 6.5
SL AMB POCT SPECIFIC GRAVITY,UA: 1.02
SL AMB POCT URINE PROTEIN: ABNORMAL
SL AMB POCT UROBILINOGEN: 1

## 2020-04-30 PROCEDURE — 3008F BODY MASS INDEX DOCD: CPT | Performed by: PHYSICIAN ASSISTANT

## 2020-04-30 PROCEDURE — 1036F TOBACCO NON-USER: CPT | Performed by: PHYSICIAN ASSISTANT

## 2020-04-30 PROCEDURE — 87086 URINE CULTURE/COLONY COUNT: CPT | Performed by: PHYSICIAN ASSISTANT

## 2020-04-30 PROCEDURE — 99214 OFFICE O/P EST MOD 30 MIN: CPT | Performed by: PHYSICIAN ASSISTANT

## 2020-04-30 PROCEDURE — 81002 URINALYSIS NONAUTO W/O SCOPE: CPT | Performed by: PHYSICIAN ASSISTANT

## 2020-05-01 LAB — BACTERIA UR CULT: NORMAL

## 2020-05-07 ENCOUNTER — HOSPITAL ENCOUNTER (OUTPATIENT)
Dept: ULTRASOUND IMAGING | Facility: HOSPITAL | Age: 55
Discharge: HOME | End: 2020-05-07
Payer: OTHER GOVERNMENT

## 2020-05-07 DIAGNOSIS — R10.9 RIGHT FLANK PAIN: ICD-10-CM

## 2020-05-07 DIAGNOSIS — K80.20 CALCULUS OF GALLBLADDER WITHOUT CHOLECYSTITIS WITHOUT OBSTRUCTION: Primary | ICD-10-CM

## 2020-05-07 DIAGNOSIS — R10.11 RUQ PAIN: ICD-10-CM

## 2020-05-07 DIAGNOSIS — R31.29 OTHER MICROSCOPIC HEMATURIA: ICD-10-CM

## 2020-05-07 DIAGNOSIS — Z87.442 HISTORY OF NEPHROLITHIASIS: ICD-10-CM

## 2020-05-07 PROCEDURE — 76705 ECHO EXAM OF ABDOMEN: CPT

## 2020-05-07 PROCEDURE — 76770 US EXAM ABDO BACK WALL COMP: CPT

## 2020-05-08 ENCOUNTER — ANTICOAG VISIT (OUTPATIENT)
Dept: CARDIOLOGY CLINIC | Facility: CLINIC | Age: 55
End: 2020-05-08
Payer: OTHER GOVERNMENT

## 2020-05-08 DIAGNOSIS — I35.9 AORTIC VALVE DISEASE: ICD-10-CM

## 2020-05-08 DIAGNOSIS — Z95.2 H/O AORTIC VALVE REPLACEMENT: ICD-10-CM

## 2020-05-08 LAB — INR PPP: 2 (ref 0.84–1.19)

## 2020-05-08 PROCEDURE — 93793 ANTICOAG MGMT PT WARFARIN: CPT | Performed by: INTERNAL MEDICINE

## 2020-06-02 ENCOUNTER — ANTICOAG VISIT (OUTPATIENT)
Dept: CARDIOLOGY CLINIC | Facility: CLINIC | Age: 55
End: 2020-06-02

## 2020-06-02 ENCOUNTER — CONSULT (OUTPATIENT)
Dept: SURGERY | Facility: CLINIC | Age: 55
End: 2020-06-02
Payer: OTHER GOVERNMENT

## 2020-06-02 ENCOUNTER — HOSPITAL ENCOUNTER (OUTPATIENT)
Dept: NON INVASIVE DIAGNOSTICS | Facility: HOSPITAL | Age: 55
Discharge: HOME | End: 2020-06-02
Attending: INTERNAL MEDICINE
Payer: OTHER GOVERNMENT

## 2020-06-02 VITALS
BODY MASS INDEX: 27.79 KG/M2 | HEIGHT: 64 IN | TEMPERATURE: 97.9 F | RESPIRATION RATE: 12 BRPM | WEIGHT: 162.8 LBS | HEART RATE: 62 BPM | DIASTOLIC BLOOD PRESSURE: 78 MMHG | SYSTOLIC BLOOD PRESSURE: 110 MMHG

## 2020-06-02 DIAGNOSIS — K80.20 CALCULUS OF GALLBLADDER WITHOUT CHOLECYSTITIS WITHOUT OBSTRUCTION: ICD-10-CM

## 2020-06-02 DIAGNOSIS — I35.9 AORTIC VALVE DISEASE: ICD-10-CM

## 2020-06-02 DIAGNOSIS — Z95.2 H/O AORTIC VALVE REPLACEMENT: Primary | ICD-10-CM

## 2020-06-02 DIAGNOSIS — Z95.2 H/O AORTIC VALVE REPLACEMENT: ICD-10-CM

## 2020-06-02 DIAGNOSIS — R10.9 RIGHT FLANK PAIN: ICD-10-CM

## 2020-06-02 DIAGNOSIS — R10.11 RUQ PAIN: ICD-10-CM

## 2020-06-02 PROBLEM — Z12.11 SCREENING FOR COLON CANCER: Status: ACTIVE | Noted: 2020-06-02

## 2020-06-02 LAB — INR PPP: 2 (ref 0.84–1.19)

## 2020-06-02 PROCEDURE — 93306 TTE W/DOPPLER COMPLETE: CPT | Performed by: INTERNAL MEDICINE

## 2020-06-02 PROCEDURE — 99243 OFF/OP CNSLTJ NEW/EST LOW 30: CPT | Performed by: SURGERY

## 2020-06-02 PROCEDURE — 93306 TTE W/DOPPLER COMPLETE: CPT

## 2020-06-08 ENCOUNTER — OFFICE VISIT (OUTPATIENT)
Dept: CARDIOLOGY CLINIC | Facility: CLINIC | Age: 55
End: 2020-06-08
Payer: OTHER GOVERNMENT

## 2020-06-08 VITALS
SYSTOLIC BLOOD PRESSURE: 118 MMHG | BODY MASS INDEX: 28.12 KG/M2 | DIASTOLIC BLOOD PRESSURE: 74 MMHG | WEIGHT: 163.8 LBS | TEMPERATURE: 97.9 F | HEART RATE: 67 BPM

## 2020-06-08 DIAGNOSIS — I35.9 AORTIC VALVE DISEASE: Primary | ICD-10-CM

## 2020-06-08 DIAGNOSIS — I49.3 PVCS (PREMATURE VENTRICULAR CONTRACTIONS): ICD-10-CM

## 2020-06-08 DIAGNOSIS — E78.2 MIXED HYPERLIPIDEMIA: ICD-10-CM

## 2020-06-08 DIAGNOSIS — R00.2 PALPITATIONS: ICD-10-CM

## 2020-06-08 PROCEDURE — 99214 OFFICE O/P EST MOD 30 MIN: CPT | Performed by: INTERNAL MEDICINE

## 2020-06-08 PROCEDURE — 1036F TOBACCO NON-USER: CPT | Performed by: INTERNAL MEDICINE

## 2020-06-08 PROCEDURE — 93000 ELECTROCARDIOGRAM COMPLETE: CPT | Performed by: INTERNAL MEDICINE

## 2020-06-26 ENCOUNTER — TELEPHONE (OUTPATIENT)
Dept: OTHER | Facility: OTHER | Age: 55
End: 2020-06-26

## 2020-06-26 ENCOUNTER — ANTICOAG VISIT (OUTPATIENT)
Dept: CARDIOLOGY CLINIC | Facility: CLINIC | Age: 55
End: 2020-06-26
Payer: OTHER GOVERNMENT

## 2020-06-26 DIAGNOSIS — Z95.2 H/O AORTIC VALVE REPLACEMENT: ICD-10-CM

## 2020-06-26 DIAGNOSIS — I35.9 AORTIC VALVE DISEASE: ICD-10-CM

## 2020-06-26 LAB — INR PPP: 1.8 (ref 0.84–1.19)

## 2020-06-26 PROCEDURE — 93793 ANTICOAG MGMT PT WARFARIN: CPT | Performed by: INTERNAL MEDICINE

## 2020-07-20 ENCOUNTER — TELEPHONE (OUTPATIENT)
Dept: CARDIOLOGY CLINIC | Facility: CLINIC | Age: 55
End: 2020-07-20

## 2020-07-20 NOTE — TELEPHONE ENCOUNTER
Tc from patient, reports she had contacted [facility] inr testing center for inr testing strips to be sent to her home  she called on 6/2/20, she still has not received test strips  She called again to [facility] today, was told she required a referral from the doctor  Did not find any fax to our  office scanned in chart or in dr Cierra Morse paperwork   Called to my support person at [place], [phone]  Per Varghese Tang, she notes, due to patient's insurance, , her pcp must provide a referral for test strips, not dr Cierra Morse  Provided name  and office number for pcp so pcp office can be reached regarding same  Called to patient, reported  same  She verified pcp office is correct  Will wait for further update

## 2020-07-21 LAB — INR PPP: 4.3 (ref 0.84–1.19)

## 2020-07-28 ENCOUNTER — ANTICOAG VISIT (OUTPATIENT)
Dept: CARDIOLOGY CLINIC | Facility: CLINIC | Age: 55
End: 2020-07-28

## 2020-07-28 DIAGNOSIS — I35.9 AORTIC VALVE DISEASE: ICD-10-CM

## 2020-07-28 DIAGNOSIS — Z95.2 H/O AORTIC VALVE REPLACEMENT: ICD-10-CM

## 2020-07-28 NOTE — PROGRESS NOTES
Tc to pt, denies any bleeding, denies any medication changes  admits to drinking some alcohol over the weekend as possible reason for inr increase  Will hold warfarin x 2 days, check inr 2 days  Patient did receive 6 test strips from co until insurance approval is received

## 2020-07-30 ENCOUNTER — TELEPHONE (OUTPATIENT)
Dept: CARDIOLOGY CLINIC | Facility: CLINIC | Age: 55
End: 2020-07-30

## 2020-07-30 NOTE — TELEPHONE ENCOUNTER
Phone call from Chayo from [address] [place], [place] [address] [facility], [phone]  Lm questioning if we had received request for referral for home inr test strips  Called to [facility], spoke with savannah, explained we have not received any referral request, the fax number she provided was not our fax number  Also informed her we are specialist, not pcp office, per Neema Llanos, my representative contact, she had told me referral must come from pcp  David Ayala will follow up with benefits office to correct any issue and have referral request sent to pcp

## 2020-07-31 ENCOUNTER — ANTICOAG VISIT (OUTPATIENT)
Dept: CARDIOLOGY CLINIC | Facility: CLINIC | Age: 55
End: 2020-07-31
Payer: OTHER GOVERNMENT

## 2020-07-31 DIAGNOSIS — I35.9 AORTIC VALVE DISEASE: ICD-10-CM

## 2020-07-31 DIAGNOSIS — Z95.2 H/O AORTIC VALVE REPLACEMENT: ICD-10-CM

## 2020-07-31 LAB — INR PPP: 1.5 (ref 0.84–1.19)

## 2020-07-31 PROCEDURE — 93793 ANTICOAG MGMT PT WARFARIN: CPT | Performed by: INTERNAL MEDICINE

## 2020-07-31 NOTE — PROGRESS NOTES
Tc to pt, reports she missed dose on 7/30  Will take 5 mg fri, 2 5 mg other days of the week, inr due 1 week

## 2020-08-21 ENCOUNTER — ANTICOAG VISIT (OUTPATIENT)
Dept: CARDIOLOGY CLINIC | Facility: CLINIC | Age: 55
End: 2020-08-21
Payer: OTHER GOVERNMENT

## 2020-08-21 DIAGNOSIS — I35.9 AORTIC VALVE DISEASE: ICD-10-CM

## 2020-08-21 DIAGNOSIS — Z95.2 H/O AORTIC VALVE REPLACEMENT: ICD-10-CM

## 2020-08-21 LAB — INR PPP: 2.6 (ref 0.84–1.19)

## 2020-08-21 PROCEDURE — 93793 ANTICOAG MGMT PT WARFARIN: CPT | Performed by: INTERNAL MEDICINE

## 2020-09-04 DIAGNOSIS — Z12.39 BREAST CANCER SCREENING: ICD-10-CM

## 2020-09-17 ENCOUNTER — TELEPHONE (OUTPATIENT)
Dept: CARDIOLOGY CLINIC | Facility: CLINIC | Age: 55
End: 2020-09-17

## 2020-09-17 LAB — INR PPP: 3 (ref 0.84–1.19)

## 2020-09-17 NOTE — TELEPHONE ENCOUNTER
Received fax from [facility] home inr testing, reporting patient is over due for home inr testing  Placed call to patient, left message on voice mail, requesting inr test be done asap

## 2020-09-18 ENCOUNTER — ANTICOAG VISIT (OUTPATIENT)
Dept: CARDIOLOGY CLINIC | Facility: CLINIC | Age: 55
End: 2020-09-18
Payer: OTHER GOVERNMENT

## 2020-09-18 DIAGNOSIS — I35.9 AORTIC VALVE DISEASE: ICD-10-CM

## 2020-09-18 DIAGNOSIS — Z95.2 H/O AORTIC VALVE REPLACEMENT: ICD-10-CM

## 2020-09-18 PROCEDURE — 93793 ANTICOAG MGMT PT WARFARIN: CPT | Performed by: INTERNAL MEDICINE

## 2020-09-18 NOTE — PROGRESS NOTES
Tc to pt, will continue current dose, inr due 2 weeks  Pt reports she took ibuprofen 2 doses yesterday for headache

## 2020-10-08 LAB — INR PPP: 1.7 (ref 0.84–1.19)

## 2020-10-09 ENCOUNTER — ANTICOAG VISIT (OUTPATIENT)
Dept: CARDIOLOGY CLINIC | Facility: CLINIC | Age: 55
End: 2020-10-09
Payer: OTHER GOVERNMENT

## 2020-10-09 DIAGNOSIS — I35.9 AORTIC VALVE DISEASE: ICD-10-CM

## 2020-10-09 DIAGNOSIS — Z95.2 H/O AORTIC VALVE REPLACEMENT: ICD-10-CM

## 2020-10-09 PROCEDURE — 93793 ANTICOAG MGMT PT WARFARIN: CPT | Performed by: INTERNAL MEDICINE

## 2020-10-24 DIAGNOSIS — Z95.2 S/P AVR (AORTIC VALVE REPLACEMENT): ICD-10-CM

## 2020-10-24 RX ORDER — WARFARIN SODIUM 5 MG/1
TABLET ORAL
Qty: 90 TABLET | Refills: 1 | Status: SHIPPED | OUTPATIENT
Start: 2020-10-24 | End: 2021-06-18

## 2020-11-03 ENCOUNTER — TELEPHONE (OUTPATIENT)
Dept: CARDIOLOGY CLINIC | Facility: CLINIC | Age: 55
End: 2020-11-03

## 2020-11-04 ENCOUNTER — ANTICOAG VISIT (OUTPATIENT)
Dept: CARDIOLOGY CLINIC | Facility: CLINIC | Age: 55
End: 2020-11-04
Payer: OTHER GOVERNMENT

## 2020-11-04 DIAGNOSIS — I35.9 AORTIC VALVE DISEASE: ICD-10-CM

## 2020-11-04 DIAGNOSIS — Z95.2 H/O AORTIC VALVE REPLACEMENT: ICD-10-CM

## 2020-11-04 LAB — INR PPP: 2.1 (ref 0.84–1.19)

## 2020-11-04 PROCEDURE — 93793 ANTICOAG MGMT PT WARFARIN: CPT | Performed by: INTERNAL MEDICINE

## 2020-11-06 ENCOUNTER — TELEPHONE (OUTPATIENT)
Dept: CARDIOLOGY CLINIC | Facility: CLINIC | Age: 55
End: 2020-11-06

## 2020-11-20 ENCOUNTER — ANTICOAG VISIT (OUTPATIENT)
Dept: CARDIOLOGY CLINIC | Facility: CLINIC | Age: 55
End: 2020-11-20
Payer: OTHER GOVERNMENT

## 2020-11-20 DIAGNOSIS — I35.9 AORTIC VALVE DISEASE: ICD-10-CM

## 2020-11-20 DIAGNOSIS — Z95.2 H/O AORTIC VALVE REPLACEMENT: ICD-10-CM

## 2020-11-20 LAB — INR PPP: 2.6 (ref 0.84–1.19)

## 2020-11-20 PROCEDURE — 93793 ANTICOAG MGMT PT WARFARIN: CPT | Performed by: INTERNAL MEDICINE

## 2020-11-23 ENCOUNTER — OFFICE VISIT (OUTPATIENT)
Dept: CARDIOLOGY CLINIC | Facility: CLINIC | Age: 55
End: 2020-11-23
Payer: OTHER GOVERNMENT

## 2020-11-23 VITALS
BODY MASS INDEX: 27.83 KG/M2 | WEIGHT: 163 LBS | TEMPERATURE: 96.6 F | HEART RATE: 76 BPM | HEIGHT: 64 IN | RESPIRATION RATE: 16 BRPM | DIASTOLIC BLOOD PRESSURE: 82 MMHG | SYSTOLIC BLOOD PRESSURE: 112 MMHG

## 2020-11-23 DIAGNOSIS — I35.9 AORTIC VALVE DISEASE: ICD-10-CM

## 2020-11-23 DIAGNOSIS — R00.2 PALPITATIONS: Primary | ICD-10-CM

## 2020-11-23 DIAGNOSIS — I49.3 PVCS (PREMATURE VENTRICULAR CONTRACTIONS): ICD-10-CM

## 2020-11-23 DIAGNOSIS — E78.2 MIXED HYPERLIPIDEMIA: ICD-10-CM

## 2020-11-23 PROCEDURE — 99214 OFFICE O/P EST MOD 30 MIN: CPT | Performed by: INTERNAL MEDICINE

## 2020-12-11 ENCOUNTER — ANTICOAG VISIT (OUTPATIENT)
Dept: CARDIOLOGY CLINIC | Facility: CLINIC | Age: 55
End: 2020-12-11
Payer: OTHER GOVERNMENT

## 2020-12-11 DIAGNOSIS — I35.9 AORTIC VALVE DISEASE: ICD-10-CM

## 2020-12-11 DIAGNOSIS — Z95.2 H/O AORTIC VALVE REPLACEMENT: ICD-10-CM

## 2020-12-11 LAB — INR PPP: 1.8 (ref 0.84–1.19)

## 2020-12-11 PROCEDURE — 93793 ANTICOAG MGMT PT WARFARIN: CPT | Performed by: INTERNAL MEDICINE

## 2021-01-07 ENCOUNTER — TELEPHONE (OUTPATIENT)
Dept: CARDIOLOGY CLINIC | Facility: CLINIC | Age: 56
End: 2021-01-07

## 2021-01-07 ENCOUNTER — ANTICOAG VISIT (OUTPATIENT)
Dept: CARDIOLOGY CLINIC | Facility: CLINIC | Age: 56
End: 2021-01-07
Payer: OTHER GOVERNMENT

## 2021-01-07 DIAGNOSIS — Z95.2 H/O AORTIC VALVE REPLACEMENT: ICD-10-CM

## 2021-01-07 DIAGNOSIS — I35.9 AORTIC VALVE DISEASE: ICD-10-CM

## 2021-01-07 LAB — INR PPP: 2.7 (ref 0.84–1.19)

## 2021-01-07 PROCEDURE — 93793 ANTICOAG MGMT PT WARFARIN: CPT

## 2021-01-29 ENCOUNTER — ANTICOAG VISIT (OUTPATIENT)
Dept: CARDIOLOGY CLINIC | Facility: CLINIC | Age: 56
End: 2021-01-29
Payer: OTHER GOVERNMENT

## 2021-01-29 DIAGNOSIS — Z95.2 H/O AORTIC VALVE REPLACEMENT: ICD-10-CM

## 2021-01-29 DIAGNOSIS — I35.9 AORTIC VALVE DISEASE: ICD-10-CM

## 2021-01-29 LAB — INR PPP: 2.2 (ref 0.84–1.19)

## 2021-01-29 PROCEDURE — 93793 ANTICOAG MGMT PT WARFARIN: CPT

## 2021-02-24 ENCOUNTER — ANTICOAG VISIT (OUTPATIENT)
Dept: CARDIOLOGY CLINIC | Facility: CLINIC | Age: 56
End: 2021-02-24
Payer: OTHER GOVERNMENT

## 2021-02-24 ENCOUNTER — TELEPHONE (OUTPATIENT)
Dept: CARDIOLOGY CLINIC | Facility: CLINIC | Age: 56
End: 2021-02-24

## 2021-02-24 DIAGNOSIS — I35.9 AORTIC VALVE DISEASE: ICD-10-CM

## 2021-02-24 DIAGNOSIS — Z95.2 H/O AORTIC VALVE REPLACEMENT: ICD-10-CM

## 2021-02-24 LAB — INR PPP: 1.9 (ref 0.84–1.19)

## 2021-02-24 PROCEDURE — 93793 ANTICOAG MGMT PT WARFARIN: CPT | Performed by: INTERNAL MEDICINE

## 2021-02-24 NOTE — TELEPHONE ENCOUNTER
Tc to pt, left message on voice mail, received fax from [employer], requesting pt test inr, she is over due for testing  Requested pt test asap

## 2021-02-24 NOTE — PROGRESS NOTES
Tc to pt, left message to increase dose, 5 mg m/w/f, 2 5 mg other days of the week, inr due 2 weeks

## 2021-03-15 DIAGNOSIS — E78.2 MIXED HYPERLIPIDEMIA: ICD-10-CM

## 2021-03-15 DIAGNOSIS — Z95.2 H/O AORTIC VALVE REPLACEMENT: Primary | ICD-10-CM

## 2021-03-16 RX ORDER — CLINDAMYCIN HYDROCHLORIDE 300 MG/1
CAPSULE ORAL
Qty: 2 CAPSULE | Refills: 3 | Status: SHIPPED | OUTPATIENT
Start: 2021-03-16 | End: 2022-01-13 | Stop reason: SDUPTHER

## 2021-03-16 RX ORDER — PRAVASTATIN SODIUM 10 MG
10 TABLET ORAL
Qty: 90 TABLET | Refills: 3 | Status: SHIPPED | OUTPATIENT
Start: 2021-03-16 | End: 2022-03-20

## 2021-03-24 ENCOUNTER — TELEPHONE (OUTPATIENT)
Dept: CARDIOLOGY CLINIC | Facility: CLINIC | Age: 56
End: 2021-03-24

## 2021-03-24 ENCOUNTER — ANTICOAG VISIT (OUTPATIENT)
Dept: CARDIOLOGY CLINIC | Facility: CLINIC | Age: 56
End: 2021-03-24

## 2021-03-24 DIAGNOSIS — Z95.2 H/O AORTIC VALVE REPLACEMENT: ICD-10-CM

## 2021-03-24 DIAGNOSIS — I35.9 AORTIC VALVE DISEASE: ICD-10-CM

## 2021-03-24 LAB — INR PPP: 2.5 (ref 0.84–1.19)

## 2021-03-24 NOTE — TELEPHONE ENCOUNTER
Tc to pt, received fax from [employer] home meter inr testing, she is over due for inr testing  Left message requesting she test inr this week

## 2021-03-29 DIAGNOSIS — Z23 ENCOUNTER FOR IMMUNIZATION: ICD-10-CM

## 2021-04-21 NOTE — PROGRESS NOTES
4/21/21, received fax request from [employer], pt is over due for inr home testing  Tc to pt, left message on cell phone requesting she test inr asap

## 2021-04-22 ENCOUNTER — ANTICOAG VISIT (OUTPATIENT)
Dept: CARDIOLOGY CLINIC | Facility: CLINIC | Age: 56
End: 2021-04-22
Payer: OTHER GOVERNMENT

## 2021-04-22 DIAGNOSIS — Z95.2 H/O AORTIC VALVE REPLACEMENT: ICD-10-CM

## 2021-04-22 DIAGNOSIS — I35.9 AORTIC VALVE DISEASE: ICD-10-CM

## 2021-04-22 LAB — INR PPP: 2.3 (ref 0.84–1.19)

## 2021-04-22 PROCEDURE — 93793 ANTICOAG MGMT PT WARFARIN: CPT | Performed by: INTERNAL MEDICINE

## 2021-05-19 ENCOUNTER — TELEPHONE (OUTPATIENT)
Dept: CARDIOLOGY CLINIC | Facility: CLINIC | Age: 56
End: 2021-05-19

## 2021-05-24 ENCOUNTER — ANTICOAG VISIT (OUTPATIENT)
Dept: CARDIOLOGY CLINIC | Facility: CLINIC | Age: 56
End: 2021-05-24
Payer: OTHER GOVERNMENT

## 2021-05-24 DIAGNOSIS — Z95.2 H/O AORTIC VALVE REPLACEMENT: ICD-10-CM

## 2021-05-24 DIAGNOSIS — I35.9 AORTIC VALVE DISEASE: ICD-10-CM

## 2021-05-24 LAB — INR PPP: 1.7 (ref 0.84–1.19)

## 2021-05-24 PROCEDURE — 93793 ANTICOAG MGMT PT WARFARIN: CPT | Performed by: INTERNAL MEDICINE

## 2021-05-24 NOTE — PROGRESS NOTES
5/24/21, tc from pt, reports she was away on vacation, missed a dose, she reports for the past few weeks, she has beenn taking 5 mg tues/fri, 2 5 mg other days of the week  Will take 5 mg today in place of 2 5 mg , then resume her usaul dose, inr due 7-10 days 
Tc to pt, left message on answering machine, increase dose, 2 5 mg sun/tues/th, 5 mg other days of the week, inr due 1 week 
Yes

## 2021-06-08 ENCOUNTER — OFFICE VISIT (OUTPATIENT)
Dept: CARDIOLOGY CLINIC | Facility: CLINIC | Age: 56
End: 2021-06-08
Payer: OTHER GOVERNMENT

## 2021-06-08 VITALS
DIASTOLIC BLOOD PRESSURE: 78 MMHG | BODY MASS INDEX: 28.91 KG/M2 | WEIGHT: 168.4 LBS | SYSTOLIC BLOOD PRESSURE: 95 MMHG | HEART RATE: 65 BPM

## 2021-06-08 DIAGNOSIS — I49.3 PVCS (PREMATURE VENTRICULAR CONTRACTIONS): ICD-10-CM

## 2021-06-08 DIAGNOSIS — E78.2 MIXED HYPERLIPIDEMIA: ICD-10-CM

## 2021-06-08 DIAGNOSIS — I35.9 AORTIC VALVE DISEASE: Primary | ICD-10-CM

## 2021-06-08 PROCEDURE — 99214 OFFICE O/P EST MOD 30 MIN: CPT | Performed by: INTERNAL MEDICINE

## 2021-06-08 PROCEDURE — 93000 ELECTROCARDIOGRAM COMPLETE: CPT | Performed by: INTERNAL MEDICINE

## 2021-06-08 NOTE — PROGRESS NOTES
Cardiology Follow Up    Pillo Rising  1965  [id number]  [address] [facility] [address]  [phone]    Reason for visit:  Follow-up for aortic valve disease status post bioprosthetic aortic valve replacement in 2007 followed by mechanical aortic valve replacement in 2008  Also has hyperlipidemia and history of PVCs  1  Aortic valve disease  POCT ECG   2  Mixed hyperlipidemia     3  PVCs (premature ventricular contractions)  POCT ECG       Interval History:  Since the patient's last visit she denies chest pain or shortness of breath  She denies edema  She gets rare palpitations which he describes as a skipped beat  She has not had the tachycardic palpitations  She has had some dizziness but attributes this to her allergies       Patient Active Problem List   Diagnosis    Aortic valve disease    Palpitations    PVCs (premature ventricular contractions)    Hyperlipemia    Anxiety    H/O aortic valve replacement    Allergic rhinitis    Aortic root enlargement ([address] )    Aortic stenosis    Dense breasts    Motion sickness    Encounter for gynecological examination    Healthcare maintenance    Right flank pain    Other microscopic hematuria    RUQ pain    History of nephrolithiasis    Calculus of gallbladder without cholecystitis without obstruction    Screening for colon cancer     Past Medical History:   Diagnosis Date    Aortic valve replaced     Hyperlipidemia     Nephrolithiasis      Social History     Socioeconomic History    Marital status: /Civil Union     Spouse name: Not on file    Number of children: Not on file    Years of education: Not on file    Highest education level: Not on file   Occupational History    Not on file   Social Needs    Financial resource strain: Not on file    Food insecurity     Worry: Not on file     Inability: Not on file   Génesis Mittal Transportation needs     Medical: Not on file     Non-medical: Not on file   Tobacco Use    Smoking status: Former Smoker    Smokeless tobacco: Never Used   Substance and Sexual Activity    Alcohol use: Yes     Comment: weekends    Drug use: No    Sexual activity: Not on file   Lifestyle    Physical activity     Days per week: Not on file     Minutes per session: Not on file    Stress: Not on file   Relationships    Social connections     Talks on phone: Not on file     Gets together: Not on file     Attends [employer] service: Not on file     Active member of club or organization: Not on file     Attends meetings of clubs or organizations: Not on file     Relationship status: Not on file    Intimate partner violence     Fear of current or ex partner: Not on file     Emotionally abused: Not on file     Physically abused: Not on file     Forced sexual activity: Not on file   Other Topics Concern    Not on file   Social History Narrative    Not on file      Family History   Problem Relation Age of Onset    Hyperlipidemia Family     COPD Family     Diabetes Family      Past Surgical History:   Procedure Laterality Date    AORTIC VALVE REPLACEMENT  05/2008    [facility]       Current Outpatient Medications:     ALPRAZolam (XANAX) 0 25 mg tablet, Take 1 tablet (0 25 mg total) by mouth see administration instructions One to two tablets daily prn palpitations, Disp: 45 tablet, Rfl: 2    pravastatin (PRAVACHOL) 10 mg tablet, Take 1 tablet (10 mg total) by mouth daily at bedtime, Disp: 90 tablet, Rfl: 3    warfarin (COUMADIN) 5 mg tablet, TAKE 1 TABLET BY MOUTH ONCE DAILY AS DIRECTED, Disp: 90 tablet, Rfl: 1    clindamycin (CLEOCIN) 300 MG capsule, Two capsule 1 hour prior to dental work(600mg) as directed by MD , Disp: 2 capsule, Rfl: 3  Allergies   Allergen Reactions    Erythromycin Nausea Only and Vomiting    Penicillins Hives    Vicodin  [Hydrocodone-Acetaminophen] Dizziness, Nausea Only and Other (See Comments)       Review of Systems:  Review of Systems   Constitutional: Negative for activity change, appetite change, fatigue and unexpected weight change  Respiratory: Negative for cough, shortness of breath and wheezing  Cardiovascular: Positive for palpitations  Negative for chest pain and leg swelling  Gastrointestinal: Negative for abdominal pain, blood in stool, constipation and diarrhea  Genitourinary: Negative for dysuria, frequency, hematuria and urgency  Musculoskeletal: Negative for arthralgias, back pain, gait problem and joint swelling  Neurological: Positive for dizziness  Negative for syncope, speech difficulty, light-headedness and headaches  Psychiatric/Behavioral: Negative for agitation, behavioral problems, confusion and decreased concentration  Physical Exam:  Vitals:    06/08/21 0805   BP: 95/78   BP Location: Left arm   Patient Position: Sitting   Cuff Size: Standard   Pulse: 65   Weight: 76 4 kg (168 lb 6 4 oz)       Physical Exam  Constitutional:       General: She is not in acute distress  Appearance: She is obese  She is not ill-appearing  HENT:      Head: Normocephalic and atraumatic  Mouth/Throat:      Mouth: Mucous membranes are moist       Pharynx: No oropharyngeal exudate or posterior oropharyngeal erythema  Eyes:      General: No scleral icterus  Conjunctiva/sclera: Conjunctivae normal    Neck:      Thyroid: No thyroid mass or thyromegaly  Vascular: Normal carotid pulses  No carotid bruit or JVD  Cardiovascular:      Rate and Rhythm: Normal rate and regular rhythm  Pulses: Normal pulses  Heart sounds: No murmur  No friction rub  No gallop  Comments: Good prosthetic heart sounds  Pulmonary:      Breath sounds: Normal breath sounds  No wheezing, rhonchi or rales  Abdominal:      Palpations: Abdomen is soft  There is no hepatomegaly, splenomegaly or mass  Tenderness: There is no abdominal tenderness  Musculoskeletal:         General: No swelling  Skin:     General: Skin is warm  Coloration: Skin is not jaundiced or pale  Findings: No bruising, erythema, lesion or rash  Neurological:      General: No focal deficit present  Mental Status: She is alert and oriented to person, place, and time  Cranial Nerves: No cranial nerve deficit  Sensory: No sensory deficit  Motor: No weakness  Psychiatric:         Mood and Affect: Mood normal          Behavior: Behavior normal          Thought Content: Thought content normal          Judgment: Judgment normal          Discussion/Summary:  1  Aortic valve disease status post aortic valve replacement x2 the last being a mechanical valve 2008  Normally functioning prosthesis  Patient remains on warfarin  Echo done last June confirmed normal function and valve sounds fine today  2  Mixed hyperlipidemia  LDL cholesterol in the 120s last year  Will repeat lipid panel at this time  Has no history of CAD  No evidence for peripheral vascular disease  3  PVCs  Mildly symptomatic at this time  Had another type of palpitation which sounded more like a tachycardic rhythm  This is not been present  Does note to monitor this with a [facility] if these recur    EKG does show rightward axis and suggest possible right ventricular hypertrophy  I reviewed her echo which shows no evidence for this        Follow-up 1 year    Piyush Castellanos MD

## 2021-06-17 LAB — INR PPP: 2.6 (ref 0.84–1.19)

## 2021-06-18 ENCOUNTER — ANTICOAG VISIT (OUTPATIENT)
Dept: CARDIOLOGY CLINIC | Facility: CLINIC | Age: 56
End: 2021-06-18
Payer: OTHER GOVERNMENT

## 2021-06-18 DIAGNOSIS — Z95.2 H/O AORTIC VALVE REPLACEMENT: ICD-10-CM

## 2021-06-18 DIAGNOSIS — I35.9 AORTIC VALVE DISEASE: Primary | ICD-10-CM

## 2021-06-18 PROCEDURE — 93793 ANTICOAG MGMT PT WARFARIN: CPT | Performed by: INTERNAL MEDICINE

## 2021-06-18 NOTE — PROGRESS NOTES
Tc to pt, left message on answering machine, continue current soriano, inr due 2 weeks  This is a home test result from [facility]

## 2021-07-14 ENCOUNTER — ANTICOAG VISIT (OUTPATIENT)
Dept: CARDIOLOGY CLINIC | Facility: CLINIC | Age: 56
End: 2021-07-14
Payer: OTHER GOVERNMENT

## 2021-07-14 DIAGNOSIS — Z95.2 H/O AORTIC VALVE REPLACEMENT: ICD-10-CM

## 2021-07-14 DIAGNOSIS — I35.9 AORTIC VALVE DISEASE: Primary | ICD-10-CM

## 2021-07-14 LAB — INR PPP: 3.6 (ref 0.84–1.19)

## 2021-07-14 PROCEDURE — 93793 ANTICOAG MGMT PT WARFARIN: CPT | Performed by: INTERNAL MEDICINE

## 2021-07-14 NOTE — PROGRESS NOTES
Tc to pt, left message on answering machine, hold x 1 day, then decrease dose, 5 mg tues, 2 5 mg other days of the week, inr due 1 week

## 2021-07-30 LAB — INR PPP: 2.7 (ref 0.84–1.19)

## 2021-08-03 ENCOUNTER — ANTICOAG VISIT (OUTPATIENT)
Dept: CARDIOLOGY CLINIC | Facility: CLINIC | Age: 56
End: 2021-08-03
Payer: OTHER GOVERNMENT

## 2021-08-03 DIAGNOSIS — Z95.2 H/O AORTIC VALVE REPLACEMENT: ICD-10-CM

## 2021-08-03 DIAGNOSIS — I35.9 AORTIC VALVE DISEASE: Primary | ICD-10-CM

## 2021-08-03 PROCEDURE — 93793 ANTICOAG MGMT PT WARFARIN: CPT | Performed by: INTERNAL MEDICINE

## 2021-08-03 NOTE — PROGRESS NOTES
Tc to pt, left message on answering machine, continue current dose, inr due 2 weeks   This is a home meter report

## 2021-08-13 ENCOUNTER — NURSE TRIAGE (OUTPATIENT)
Dept: OTHER | Facility: OTHER | Age: 56
End: 2021-08-13

## 2021-08-13 NOTE — TELEPHONE ENCOUNTER
Regarding: posion ivy   ----- Message from Wali Clements sent at 8/13/2021  6:36 PM EDT -----  "I have poison ivy that is getting worst and itchy   I think I need prednisone or something to be called in" (2) assistive person

## 2021-08-13 NOTE — TELEPHONE ENCOUNTER
Patient calling with complaints of widespread poison toi  Messaged Dr Latesha York requested that patient be seen in office tomorrow he has Saturday hours    Called patient made aware and appt made for Saturday morning appt

## 2021-08-13 NOTE — TELEPHONE ENCOUNTER
Reason for Disposition   SEVERE itching (e g , interferes with sleep or normal activities)    Answer Assessment - Initial Assessment Questions  1  APPEARANCE of RASH: "Describe the rash "       Blistering bumps and itch  2  LOCATION: "Where is the rash located?"       Neck arms on legs and feet  3  SIZE: "How large is the rash?"       Very large  4  ONSET: "When did the rash begin?"       Last weekend  5  ITCHING: "Does the rash itch?" If Yes, ask: "How bad is it?"    - MILD - doesn't interfere with normal activities    - MODERATE-SEVERE: interferes with work, school, sleep, or other activities       Yes   6   PREGNANCY: "Is there any chance you are pregnant?" "When was your last menstrual period?"      No    Protocols used: POISON IVY - [place] - SUMAC-ADULT-

## 2021-08-14 ENCOUNTER — OFFICE VISIT (OUTPATIENT)
Dept: FAMILY MEDICINE CLINIC | Facility: CLINIC | Age: 56
End: 2021-08-14
Payer: OTHER GOVERNMENT

## 2021-08-14 VITALS
BODY MASS INDEX: 28.68 KG/M2 | HEIGHT: 64 IN | RESPIRATION RATE: 16 BRPM | HEART RATE: 72 BPM | WEIGHT: 168 LBS | DIASTOLIC BLOOD PRESSURE: 68 MMHG | SYSTOLIC BLOOD PRESSURE: 100 MMHG

## 2021-08-14 DIAGNOSIS — L23.7 ALLERGIC CONTACT DERMATITIS DUE TO PLANTS, EXCEPT FOOD: Primary | ICD-10-CM

## 2021-08-14 DIAGNOSIS — K80.20 CALCULUS OF GALLBLADDER WITHOUT CHOLECYSTITIS WITHOUT OBSTRUCTION: ICD-10-CM

## 2021-08-14 DIAGNOSIS — Z12.12 SCREENING FOR COLORECTAL CANCER: ICD-10-CM

## 2021-08-14 DIAGNOSIS — Z11.4 SCREENING FOR HIV (HUMAN IMMUNODEFICIENCY VIRUS): ICD-10-CM

## 2021-08-14 DIAGNOSIS — Z11.59 NEED FOR HEPATITIS C SCREENING TEST: ICD-10-CM

## 2021-08-14 DIAGNOSIS — I35.9 AORTIC VALVE DISEASE: ICD-10-CM

## 2021-08-14 DIAGNOSIS — Z12.11 SCREENING FOR COLORECTAL CANCER: ICD-10-CM

## 2021-08-14 DIAGNOSIS — E78.2 MIXED HYPERLIPIDEMIA: ICD-10-CM

## 2021-08-14 DIAGNOSIS — Z12.31 ENCOUNTER FOR SCREENING MAMMOGRAM FOR BREAST CANCER: ICD-10-CM

## 2021-08-14 PROCEDURE — 99214 OFFICE O/P EST MOD 30 MIN: CPT | Performed by: FAMILY MEDICINE

## 2021-08-14 RX ORDER — BETAMETHASONE DIPROPIONATE 0.5 MG/G
CREAM TOPICAL 2 TIMES DAILY
Qty: 30 G | Refills: 0 | Status: SHIPPED | OUTPATIENT
Start: 2021-08-14 | End: 2021-10-06 | Stop reason: ALTCHOICE

## 2021-08-14 NOTE — ASSESSMENT & PLAN NOTE
Patient has had some episodes of gallbladder problems since her visit with General surgery before  Reviewed with her that emergency surgery for gallbladder would carry significant risks  Elective surgery would certainly be less risk due to being able to bridge for Lovenox  Follow with General surgery and Cardiology for discussion of this

## 2021-08-14 NOTE — PROGRESS NOTES
Assessment and Plan:    Problem List Items Addressed This Visit     Aortic valve disease     Status post mechanical valve  Currently on warfarin  Follows with cardiology and cardiology clinic for anticoagulation  No changes at the moment  If she does need surgery in the future, she would need to have Lovenox bridging  Relevant Orders    CBC and differential    Calculus of gallbladder without cholecystitis without obstruction     Patient has had some episodes of gallbladder problems since her visit with General surgery before  Reviewed with her that emergency surgery for gallbladder would carry significant risks  Elective surgery would certainly be less risk due to being able to bridge for Lovenox  Follow with General surgery and Cardiology for discussion of this  Relevant Orders    Comprehensive metabolic panel    CBC and differential    Hyperlipemia     Patient is on pravastatin for hyperlipidemia  Check labs  Cardiology ordered lipid panel, however I will reorder due to changing to [facility] for resulting agency, and adding the CMP to check for liver functions, as well as electrolytes  Relevant Orders    Comprehensive metabolic panel    Lipid Panel with Direct LDL reflex      Other Visit Diagnoses     Allergic contact dermatitis due to plants, except food    -  Primary    Trial Betamethasone cream   Claritin PO in AM and Benadryl at HS (2 tabs)  If worse, consider prednisone taper  Screening for colorectal cancer        Recommend Cologuard as she does not wish to have colonoscopy at this time      Relevant Orders    Cologuard    Need for hepatitis C screening test        CDC recommended screening    Relevant Orders    Hepatitis C Ab W/Refl To HCV RNA, Qn, PCR    Screening for HIV (human immunodeficiency virus)        CDC recommended screening    Relevant Orders    [facility]- Human Immunodeficiency Virus 1/2 Antigen / Antibody ( Fourth Generation) with Reflex Testing    Encounter for screening mammogram for breast cancer        Ordered yearly mammogram    Relevant Orders    Mammo screening bilateral w 3d & cad                 Diagnoses and all orders for this visit:    Allergic contact dermatitis due to plants, except food  Comments:  Trial Betamethasone cream   Claritin PO in AM and Benadryl at HS (2 tabs)  If worse, consider prednisone taper  Calculus of gallbladder without cholecystitis without obstruction  -     Comprehensive metabolic panel; Future  -     CBC and differential; Future  -     Comprehensive metabolic panel  -     CBC and differential    Mixed hyperlipidemia  -     Comprehensive metabolic panel; Future  -     Lipid Panel with Direct LDL reflex; Future  -     Comprehensive metabolic panel  -     Lipid Panel with Direct LDL reflex    Aortic valve disease  -     CBC and differential; Future  -     CBC and differential    Screening for colorectal cancer  Comments:  Recommend Cologuard as she does not wish to have colonoscopy at this time  Orders:  -     Cologuard; Future    Need for hepatitis C screening test  Comments:  CDC recommended screening  Orders:  -     Hepatitis C Ab W/Refl To HCV RNA, Qn, PCR; Future  -     Hepatitis C Ab W/Refl To HCV RNA, Qn, PCR    Screening for HIV (human immunodeficiency virus)  Comments:  CDC recommended screening  Orders:  -     [facility]- Human Immunodeficiency Virus 1/2 Antigen / Antibody ( Fourth Generation) with Reflex Testing; Future    Encounter for screening mammogram for breast cancer  Comments:  Ordered yearly mammogram  Orders:  -     Mammo screening bilateral w 3d & cad; Future    Other orders  -     Cancel: Hepatitis C Antibody ([facility]); Future  -     Cancel: HIV 1/2 Antigen/Antibody (4th Generation) w Reflex [facility]; Future              Subjective:      Patient ID: Akshat Blackman is a 63 y o  female      CC:    Chief Complaint   Patient presents with    Rash     Pt has posion ivy everywhere x 1 week  NATALIE herrera       HPI:    Patient has rash on her left forearm  She had taken her dog recently on a walk, and it was a different path than they normally been on  After that, she noticed the irritation on the left arm  Shortly after that, she noticed irritation in multiple other areas, including hands, neck, legs, low back, left side  She has been having some significant itching with it, causing her to not be able to sleep at night  She has to scratch quite a bit with sleep  Currently using Ivarest   She is not using Benadryl or Claritin  No topical steroids  Heart valve:  Patient does have history of aortic stenosis with valve replacement  Previously had bioprosthetic valve, then had mechanical valve  Follows with cardiology  Currently on Coumadin  Gallstones:  Patient did see General surgery before  They discussed about surgery  She still does occasionally have symptoms  It is on the right side  She does try to avoid fatty foods, it seems to be somewhat improved  Hyperlipidemia:  Patient is currently on pravastatin  Colon cancer screening:  Patient had been seen by General surgery previously who recommended colonoscopy  There is fairly strong wording about this in the note  Reviewed about Cologuard  The following portions of the patient's history were reviewed and updated as appropriate: allergies, current medications, past family history, past medical history, past social history, past surgical history and problem list       Review of Systems   Constitutional: Negative  HENT: Negative  Eyes: Negative  Respiratory: Negative  Cardiovascular: Negative  Gastrointestinal: Positive for abdominal pain (per HPI)  Endocrine: Negative  Genitourinary: Negative  Musculoskeletal: Negative  Skin: Positive for rash  Allergic/Immunologic: Negative  Neurological: Negative  Hematological: Negative  Psychiatric/Behavioral: Negative  Data to review:       Objective:    Vitals:    08/14/21 0948   BP: 100/68   BP Location: Left arm   Patient Position: Sitting   Cuff Size: Large   Pulse: 72   Resp: 16   Weight: 76 2 kg (168 lb)   Height: 5' 4" (1 626 m)        Physical Exam  Vitals and nursing note reviewed  Constitutional:       Appearance: Normal appearance  HENT:      Head: Normocephalic  Cardiovascular:      Rate and Rhythm: Normal rate and regular rhythm  Pulses: Normal pulses  Carotid pulses are 2+ on the right side and 2+ on the left side  Heart sounds: Normal heart sounds  No murmur heard  No friction rub  No gallop  Pulmonary:      Effort: Pulmonary effort is normal  No respiratory distress  Breath sounds: Normal breath sounds  No stridor  No wheezing, rhonchi or rales  Chest:      Chest wall: No tenderness  Skin:         Neurological:      Mental Status: She is alert  BMI Counseling: Body mass index is 28 84 kg/m²  The BMI is above normal  Nutrition recommendations include decreasing portion sizes, encouraging healthy choices of fruits and vegetables, decreasing fast food intake, consuming healthier snacks, limiting drinks that contain sugar, moderation in carbohydrate intake, increasing intake of lean protein, reducing intake of saturated and trans fat and reducing intake of cholesterol  Exercise recommendations include exercising 3-5 times per week  No pharmacotherapy was ordered

## 2021-08-14 NOTE — ASSESSMENT & PLAN NOTE
Patient is on pravastatin for hyperlipidemia  Check labs  Cardiology ordered lipid panel, however I will reorder due to changing to [facility] for [facility] agency, and adding the CMP to check for liver functions, as well as electrolytes

## 2021-08-14 NOTE — ASSESSMENT & PLAN NOTE
Status post mechanical valve  Currently on warfarin  Follows with cardiology and cardiology clinic for anticoagulation  No changes at the moment  If she does need surgery in the future, she would need to have Lovenox bridging

## 2021-08-16 ENCOUNTER — TELEPHONE (OUTPATIENT)
Dept: ADMINISTRATIVE | Facility: OTHER | Age: 56
End: 2021-08-16

## 2021-08-16 NOTE — TELEPHONE ENCOUNTER
Upon review of the In Basket request we found 08/27/2020 mammogram is in [facility]   09/01/2020 is result date  Any additional questions or concerns should be emailed to the Practice Liaisons via [email or web address] email, please do not reply via In Basket      Thank you  Elisabeth Jiang MA

## 2021-08-16 NOTE — TELEPHONE ENCOUNTER
----- Message from Dianelys Strong, [address] sent at 8/14/2021  7:41 AM EDT -----  Regarding: mammogram  08/14/21 7:41 AM    Hello, our patient Parish Cintron has had Mammogram completed/performed  Please assist in updating the patient chart by pulling the Care Everywhere (CE) document   The date of service is 9/1/2020    Thank you,  Dianelys Strong MA   [facility]

## 2021-08-20 LAB
ALBUMIN SERPL-MCNC: 4.6 G/DL (ref 3.6–5.1)
ALBUMIN/GLOB SERPL: 1.9 (CALC) (ref 1–2.5)
ALP SERPL-CCNC: 50 U/L (ref 37–153)
ALT SERPL-CCNC: 30 U/L (ref 6–29)
AST SERPL-CCNC: 25 U/L (ref 10–35)
BASOPHILS # BLD AUTO: 51 CELLS/UL (ref 0–200)
BASOPHILS NFR BLD AUTO: 1 %
BILIRUB SERPL-MCNC: 1.4 MG/DL (ref 0.2–1.2)
BUN SERPL-MCNC: 12 MG/DL (ref 7–25)
BUN/CREAT SERPL: ABNORMAL (CALC) (ref 6–22)
CALCIUM SERPL-MCNC: 9.5 MG/DL (ref 8.6–10.4)
CHLORIDE SERPL-SCNC: 103 MMOL/L (ref 98–110)
CHOLEST SERPL-MCNC: 223 MG/DL
CHOLEST/HDLC SERPL: 3.5 (CALC)
CO2 SERPL-SCNC: 27 MMOL/L (ref 20–32)
CREAT SERPL-MCNC: 0.59 MG/DL (ref 0.5–1.05)
EOSINOPHIL # BLD AUTO: 219 CELLS/UL (ref 15–500)
EOSINOPHIL NFR BLD AUTO: 4.3 %
ERYTHROCYTE [DISTWIDTH] IN BLOOD BY AUTOMATED COUNT: 12.3 % (ref 11–15)
GLOBULIN SER CALC-MCNC: 2.4 G/DL (CALC) (ref 1.9–3.7)
GLUCOSE SERPL-MCNC: 86 MG/DL (ref 65–99)
HCT VFR BLD AUTO: 37.9 % (ref 35–45)
HCV AB S/CO SERPL IA: 0.09
HCV AB SERPL QL IA: NORMAL
HDLC SERPL-MCNC: 64 MG/DL
HGB BLD-MCNC: 12.7 G/DL (ref 11.7–15.5)
HIV 1+2 AB+HIV1 P24 AG SERPL QL IA: NORMAL
LDLC SERPL CALC-MCNC: 129 MG/DL (CALC)
LYMPHOCYTES # BLD AUTO: 1244 CELLS/UL (ref 850–3900)
LYMPHOCYTES NFR BLD AUTO: 24.4 %
MCH RBC QN AUTO: 31.3 PG (ref 27–33)
MCHC RBC AUTO-ENTMCNC: 33.5 G/DL (ref 32–36)
MCV RBC AUTO: 93.3 FL (ref 80–100)
MONOCYTES # BLD AUTO: 510 CELLS/UL (ref 200–950)
MONOCYTES NFR BLD AUTO: 10 %
NEUTROPHILS # BLD AUTO: 3075 CELLS/UL (ref 1500–7800)
NEUTROPHILS NFR BLD AUTO: 60.3 %
NONHDLC SERPL-MCNC: 159 MG/DL (CALC)
PLATELET # BLD AUTO: 240 THOUSAND/UL (ref 140–400)
PMV BLD REES-ECKER: 10.1 FL (ref 7.5–12.5)
POTASSIUM SERPL-SCNC: 3.9 MMOL/L (ref 3.5–5.3)
PROT SERPL-MCNC: 7 G/DL (ref 6.1–8.1)
RBC # BLD AUTO: 4.06 MILLION/UL (ref 3.8–5.1)
SL AMB EGFR AFRICAN AMERICAN: 119 ML/MIN/1.73M2
SL AMB EGFR NON AFRICAN AMERICAN: 102 ML/MIN/1.73M2
SODIUM SERPL-SCNC: 138 MMOL/L (ref 135–146)
TRIGL SERPL-MCNC: 188 MG/DL
WBC # BLD AUTO: 5.1 THOUSAND/UL (ref 3.8–10.8)

## 2021-08-25 ENCOUNTER — TELEPHONE (OUTPATIENT)
Dept: CARDIOLOGY CLINIC | Facility: CLINIC | Age: 56
End: 2021-08-25

## 2021-08-25 NOTE — TELEPHONE ENCOUNTER
Called pt and LM    reviewed labs  Lynita Ped alt up very little   Lynita Ped of no concern    same for bili    TGs 188 but better  Lynita Ped  LDL in 120s but HDL in 60s and has no CAD/PVD-advised same pravastatin

## 2021-08-25 NOTE — TELEPHONE ENCOUNTER
received fax message from [employer], reporting patient is over due for inr home testing  Called to patient, reviewed same  She is currently on vacation, returning today  She will test inr tomorrow  Patient reports she had blood work done 8/19/21, which included lipid profile, she was requesting dr Dwayne Regan call her to discuss  Please call patient's cell phone to discuss results  [phone]

## 2021-08-30 ENCOUNTER — TELEPHONE (OUTPATIENT)
Dept: OTHER | Facility: OTHER | Age: 56
End: 2021-08-30

## 2021-08-30 LAB — INR PPP: 1.5 (ref 0.84–1.19)

## 2021-08-31 ENCOUNTER — ANTICOAG VISIT (OUTPATIENT)
Dept: CARDIOLOGY CLINIC | Facility: CLINIC | Age: 56
End: 2021-08-31
Payer: OTHER GOVERNMENT

## 2021-08-31 DIAGNOSIS — I35.9 AORTIC VALVE DISEASE: Primary | ICD-10-CM

## 2021-08-31 DIAGNOSIS — Z95.2 H/O AORTIC VALVE REPLACEMENT: ICD-10-CM

## 2021-08-31 PROCEDURE — 93793 ANTICOAG MGMT PT WARFARIN: CPT | Performed by: INTERNAL MEDICINE

## 2021-08-31 NOTE — PROGRESS NOTES
Tc to pt, reports she took 5 mg yesterday in place of 2 5 mg   Will continue current dose, inr due 1 week  This is a home meter result

## 2021-09-03 ENCOUNTER — TELEPHONE (OUTPATIENT)
Dept: ADMINISTRATIVE | Facility: OTHER | Age: 56
End: 2021-09-03

## 2021-09-03 NOTE — TELEPHONE ENCOUNTER
----- Message from Franklin Lyon sent at 9/3/2021  9:46 AM EDT -----  Regarding: Care Gap request/ [facility]  09/03/21 9:46 AM    Hello, our patient Tg Chavez has had Mammogram completed/performed  Please assist in updating the patient chart by pulling the Care Everywhere (CE) document  The date of service is 09/02/2021       Thank you,  Franklin [facility]

## 2021-09-03 NOTE — TELEPHONE ENCOUNTER
Upon review of the In Basket request we were able to locate, review, and update the patient chart as requested for Mammogram     Any additional questions or concerns should be emailed to the Practice Liaisons via [email or web address] email, please do not reply via In Basket      Thank you  Christian Sena MA

## 2021-09-13 ENCOUNTER — CONSULT (OUTPATIENT)
Dept: SURGERY | Facility: CLINIC | Age: 56
End: 2021-09-13
Payer: OTHER GOVERNMENT

## 2021-09-13 ENCOUNTER — ANTICOAG VISIT (OUTPATIENT)
Dept: CARDIOLOGY CLINIC | Facility: CLINIC | Age: 56
End: 2021-09-13

## 2021-09-13 VITALS
DIASTOLIC BLOOD PRESSURE: 72 MMHG | HEIGHT: 64 IN | TEMPERATURE: 97.9 F | HEART RATE: 74 BPM | SYSTOLIC BLOOD PRESSURE: 118 MMHG | BODY MASS INDEX: 28.51 KG/M2 | WEIGHT: 167 LBS

## 2021-09-13 DIAGNOSIS — K80.20 CALCULUS OF GALLBLADDER WITHOUT CHOLECYSTITIS WITHOUT OBSTRUCTION: Primary | ICD-10-CM

## 2021-09-13 DIAGNOSIS — I35.9 AORTIC VALVE DISEASE: Primary | ICD-10-CM

## 2021-09-13 DIAGNOSIS — Z95.2 H/O AORTIC VALVE REPLACEMENT: ICD-10-CM

## 2021-09-13 DIAGNOSIS — I35.9 AORTIC VALVE DISEASE: ICD-10-CM

## 2021-09-13 LAB — INR PPP: 2.4 (ref 0.84–1.19)

## 2021-09-13 PROCEDURE — 99244 OFF/OP CNSLTJ NEW/EST MOD 40: CPT | Performed by: SURGERY

## 2021-09-13 NOTE — PROGRESS NOTES
Assessment/Plan:   Parish Cintron is a 63 y  o female who is here for Gallbladder disease         Upon evaluation today patient has: Chronic Cholecystitis      Plan: Laparoscopic Cholecystectomy with possible Intraoperative Cholangiogram  Possible Robotic assisted       needs bridging from Lovenox   Bilirubin is 1 4, needs potentially intraoperative cholangiogram were at least repeat labs and ultrasound preop  Post Op Pain Management: [facility]co      Ultrasound findings:   Normal liver 15 2 cm  Mild cholelithiasis  Common bile duct is normal, gallbladder wall normal limits    PATIENT WILL NEED CARDIAC CLEARANCE AND OR BRIDGING ON LOVENOX FROM HER COUMADIN  TOTAL BILIRUBIN IS 1 4  NEED TO RECHECK THIS OR CONSIDER CHOLANGIOGRAM   INTO THAT AND WE WILL REPEAT THE ULTRASOUND TO MAKE SURE SHE DOES NOT HAVE ANY COMMON BILE DUCT STONES    PATIENT IS DUE FOR COLONOSCOPY WE SHOULD COMBINE THIS AT THE SAME TIME FOR HER AORTIC VALVE? THIS WOULD BE A ANOTHER DAY OFF OF ANTICOAGULATION? WILL CHECK WITH CARDIOLOGY? Patient is increased risk of bleeding secondary to anticoagulation and high risk for surgical postoperative sequela or complications  She is aware and agrees to proceed electively which would be much safer than an emergency operation even though her elective risk is still elevated  Preoperative Clearance: Medical and Cardiac          Images:         ____________________________________________________    HPI:  Parish Cintron is a 63 y  o female who was referred for evaluation of There were no encounter diagnoses  Abdominal pain Location: in the RUQ with radiation to back, which is Intermittent  and over 1 year     no nausea and no vomiting,     regular bowel movement       Duration of pain or symptoms: over 1 year and no pain       Prior Colonoscopy : Unknown     Prior Abdominal Surgery:   DENIES BUT DID HAVE OPEN HEART SURGERY    Anticoagulation: warfarin    Imaging:   No orders to display           LABS:    Lab Results   Component Value Date    K 3 9 08/19/2021     08/19/2021    CO2 27 08/19/2021    BUN 12 08/19/2021    CREATININE 0 59 08/19/2021    CALCIUM 9 5 08/19/2021    AST 25 08/19/2021    ALT 30 (H) 08/19/2021    ALKPHOS 50 08/19/2021       Lab Results   Component Value Date    WBC 5 1 08/19/2021    HGB 12 7 08/19/2021    HCT 37 9 08/19/2021    MCV 93 3 08/19/2021     08/19/2021     Lab Results   Component Value Date    INR 2 40 (A) 09/13/2021    PROTIME 21 2 (H) 11/08/2018     No results found for: HGBA1C        ROS:  General ROS: negative for - chills, fatigue, fever or night sweats, weight loss  Respiratory ROS: no cough, shortness of breath, or wheezing  Cardiovascular ROS: no chest pain or dyspnea on exertion  Genito-Urinary ROS: no dysuria, trouble voiding, or hematuria  Musculoskeletal ROS: negative for - gait disturbance, joint pain or muscle pain  Neurological ROS: no TIA or stroke symptoms  Gastrointestinal ROS: See HPI   GI ROS: see HPI  Skin ROS: no new rashes or lesions   Lymphatic ROS: no new adenopathy noted by pt     GYN ROS: see HPI, no new GYN history or bleeding noted  Psy ROS: no new mental or behavioral disturbances       Patient Active Problem List   Diagnosis    Aortic valve disease    Palpitations    PVCs (premature ventricular contractions)    Hyperlipemia    Anxiety    H/O aortic valve replacement    Allergic rhinitis    Aortic root enlargement (HCC)    Aortic stenosis    Dense breasts    Motion sickness    Encounter for gynecological examination    Healthcare maintenance    Right flank pain    Other microscopic hematuria    RUQ pain    History of nephrolithiasis    Calculus of gallbladder without cholecystitis without obstruction    Screening for colon cancer         Allergies:  Erythromycin, Penicillins, and Vicodin  [hydrocodone-acetaminophen]      Current Outpatient Medications:     ALPRAZolam (XANAX) 0 25 mg tablet, Take 1 tablet (0 25 mg total) by mouth see administration instructions One to two tablets daily prn palpitations, Disp: 45 tablet, Rfl: 2    pravastatin (PRAVACHOL) 10 mg tablet, Take 1 tablet (10 mg total) by mouth daily at bedtime, Disp: 90 tablet, Rfl: 3    warfarin (COUMADIN) 5 mg tablet, TAKE 1 TABLET BY MOUTH ONCE DAILY AS DIRECTED, Disp: 90 tablet, Rfl: 1    betamethasone, augmented, (DIPROLENE-AF) 0 05 % cream, Apply topically 2 (two) times a day (Patient not taking: Reported on 9/13/2021), Disp: 30 g, Rfl: 0    clindamycin (CLEOCIN) 300 MG capsule, Two capsule 1 hour prior to dental work(600mg) as directed by MD  (Patient not taking: Reported on 9/13/2021), Disp: 2 capsule, Rfl: 3    Past Medical History:   Diagnosis Date    Aortic valve replaced     Hyperlipidemia     Nephrolithiasis        Past Surgical History:   Procedure Laterality Date    AORTIC VALVE REPLACEMENT  05/2008    [facility]       Family History   Problem Relation Age of Onset    Hyperlipidemia Family     COPD Family     Diabetes Family         reports that she has quit smoking  She has never used smokeless tobacco  She reports current alcohol use  She reports that she does not use drugs  Exam:   Vitals:    09/13/21 1252   BP: 118/72   Pulse: 74   Temp: 97 9 °F (36 6 °C)       PHYSICAL EXAM  General Appearance:    Alert, cooperative, no distress,    Head:    Normocephalic without obvious abnormality   Eyes:    PERRL, conjunctiva/corneas clear,       Neck:   Supple, no adenopathy, no JVD   Back:     Symmetric, no spinal or CVA tenderness   Lungs:     Clear to auscultation bilaterally, no wheezing or rhonchi   Heart:    Regular rate and rhythm, S1 and S2 normal, no murmur   Abdomen:     abdomen is soft without significant tenderness, masses, organomegaly or guarding Bowel sounds are normal  Some tenderness in the right upper quadrant but not acute        Extremities:   Extremities normal  No clubbing, cyanosis or edema Psych:   Normal Affect, AOx3  Neurologic:  Skin:   CNII-XII intact  Strength symmetric, speech intact    Warm, dry, intact, no visible rashes or lesions      Informed consent for procedure was personally discussed, reviewed, and signed by Dr Rimma Magallanes  Discussion by Dr Rimma Magallanes was carried out regarding risks, benefits, and alternatives with the patient  Risks include but are not limited to:  bleeding, infection, and delayed wound healing or an open wound, pulmonary embolus, leaks from bowel or bile ducts or other viscus, transfusions, death  Discussed in further detail the more common complications and their rates of occurrence   was used if necessary  Patient expressed understanding of the issues discussed and wished/consented to proceed  All questions were answered by Dr Rimma Magallanes  Discussion performed between patient and the provider signing below  Signature:   Gin Baron MD    Date: 9/13/2021 Time: 4:08 PM                          Some portions of this record may have been generated with voice recognition software  There may be translation, syntax,  or grammatical errors  Occasional wrong word or "sound-a-like" substitutions may have occurred due to the inherent limitations of the voice recognition software  Read the chart carefully and recognize, using context, where substitutions may have occurred  If you have any questions, please contact the dictating provider for clarification or correction, as needed  This encounter has been coded by a non-certified coder

## 2021-09-15 ENCOUNTER — HOSPITAL ENCOUNTER (OUTPATIENT)
Dept: ULTRASOUND IMAGING | Facility: HOSPITAL | Age: 56
Discharge: HOME | End: 2021-09-15
Attending: SURGERY
Payer: OTHER GOVERNMENT

## 2021-09-15 DIAGNOSIS — K80.20 CALCULUS OF GALLBLADDER WITHOUT CHOLECYSTITIS WITHOUT OBSTRUCTION: ICD-10-CM

## 2021-09-15 PROCEDURE — 76705 ECHO EXAM OF ABDOMEN: CPT

## 2021-09-24 ENCOUNTER — NURSE TRIAGE (OUTPATIENT)
Dept: OTHER | Facility: OTHER | Age: 56
End: 2021-09-24

## 2021-09-25 NOTE — TELEPHONE ENCOUNTER
Regarding: UTI infection wondering if i can have a prescription call in   ----- Message from Mare Shen sent at 9/24/2021  7:14 PM EDT -----  " I think I have a UTI infection and I was wondering if I can have a prescription call in "

## 2021-09-25 NOTE — TELEPHONE ENCOUNTER
Reason for Disposition   Urinating more frequently than usual (i e , frequency)    Answer Assessment - Initial Assessment Questions  1  SYMPTOM: "What's the main symptom you're concerned about?" (e g , frequency, incontinence)      Frequency, urine odor and pain in bladder area   2  ONSET: "When did the  Frequency, urine odor, pain in bladder area  start?"     3 days ago   3  PAIN: "Is there any pain?" If Yes, ask: "How bad is it?" (Scale: 1-10; mild, moderate, severe)      5/10  4  CAUSE: "What do you think is causing the symptoms?"      Frequent UTI  5  OTHER SYMPTOMS: "Do you have any other symptoms?" (e g , fever, flank pain, blood in urine, pain with urination)      no  6   PREGNANCY: "Is there any chance you are pregnant?" "When was your last menstrual period?"      no    Protocols used: [facility]

## 2021-09-27 ENCOUNTER — TELEPHONE (OUTPATIENT)
Dept: SURGERY | Facility: CLINIC | Age: 56
End: 2021-09-27

## 2021-10-06 ENCOUNTER — OFFICE VISIT (OUTPATIENT)
Dept: FAMILY MEDICINE CLINIC | Facility: CLINIC | Age: 56
End: 2021-10-06
Payer: OTHER GOVERNMENT

## 2021-10-06 ENCOUNTER — TELEPHONE (OUTPATIENT)
Dept: CARDIOLOGY CLINIC | Facility: CLINIC | Age: 56
End: 2021-10-06

## 2021-10-06 VITALS
HEART RATE: 76 BPM | SYSTOLIC BLOOD PRESSURE: 114 MMHG | HEIGHT: 64 IN | WEIGHT: 167 LBS | BODY MASS INDEX: 28.51 KG/M2 | TEMPERATURE: 97.8 F | DIASTOLIC BLOOD PRESSURE: 70 MMHG

## 2021-10-06 DIAGNOSIS — N30.00 ACUTE CYSTITIS WITHOUT HEMATURIA: Primary | ICD-10-CM

## 2021-10-06 DIAGNOSIS — K80.80 BILIARY CALCULUS OF OTHER SITE WITHOUT OBSTRUCTION: ICD-10-CM

## 2021-10-06 DIAGNOSIS — I35.9 AORTIC VALVE DISEASE: ICD-10-CM

## 2021-10-06 LAB
SL AMB  POCT GLUCOSE, UA: ABNORMAL
SL AMB LEUKOCYTE ESTERASE,UA: ABNORMAL
SL AMB POCT BILIRUBIN,UA: ABNORMAL
SL AMB POCT BLOOD,UA: ABNORMAL
SL AMB POCT CLARITY,UA: ABNORMAL
SL AMB POCT COLOR,UA: YELLOW
SL AMB POCT KETONES,UA: ABNORMAL
SL AMB POCT NITRITE,UA: ABNORMAL
SL AMB POCT PH,UA: 5.5
SL AMB POCT SPECIFIC GRAVITY,UA: 1.02
SL AMB POCT URINE PROTEIN: ABNORMAL
SL AMB POCT UROBILINOGEN: 0.2

## 2021-10-06 PROCEDURE — 99214 OFFICE O/P EST MOD 30 MIN: CPT | Performed by: PHYSICIAN ASSISTANT

## 2021-10-06 PROCEDURE — 81002 URINALYSIS NONAUTO W/O SCOPE: CPT | Performed by: PHYSICIAN ASSISTANT

## 2021-10-06 PROCEDURE — 87186 SC STD MICRODIL/AGAR DIL: CPT | Performed by: PHYSICIAN ASSISTANT

## 2021-10-06 PROCEDURE — 87086 URINE CULTURE/COLONY COUNT: CPT | Performed by: PHYSICIAN ASSISTANT

## 2021-10-06 PROCEDURE — 87077 CULTURE AEROBIC IDENTIFY: CPT | Performed by: PHYSICIAN ASSISTANT

## 2021-10-06 RX ORDER — CIPROFLOXACIN 500 MG/1
500 TABLET, FILM COATED ORAL EVERY 12 HOURS SCHEDULED
Qty: 14 TABLET | Refills: 0 | Status: SHIPPED | OUTPATIENT
Start: 2021-10-06 | End: 2021-10-11

## 2021-10-07 ENCOUNTER — ANTICOAG VISIT (OUTPATIENT)
Dept: CARDIOLOGY CLINIC | Facility: CLINIC | Age: 56
End: 2021-10-07
Payer: OTHER GOVERNMENT

## 2021-10-07 DIAGNOSIS — Z95.2 H/O AORTIC VALVE REPLACEMENT: ICD-10-CM

## 2021-10-07 DIAGNOSIS — I35.9 AORTIC VALVE DISEASE: Primary | ICD-10-CM

## 2021-10-07 LAB — INR PPP: 2.8 (ref 0.84–1.19)

## 2021-10-07 PROCEDURE — 93793 ANTICOAG MGMT PT WARFARIN: CPT | Performed by: INTERNAL MEDICINE

## 2021-10-09 LAB
BACTERIA UR CULT: ABNORMAL
BACTERIA UR CULT: ABNORMAL

## 2021-10-11 ENCOUNTER — TELEPHONE (OUTPATIENT)
Dept: CARDIOLOGY CLINIC | Facility: CLINIC | Age: 56
End: 2021-10-11

## 2021-10-11 ENCOUNTER — TELEPHONE (OUTPATIENT)
Dept: FAMILY MEDICINE CLINIC | Facility: CLINIC | Age: 56
End: 2021-10-11

## 2021-10-11 DIAGNOSIS — N30.00 ACUTE CYSTITIS WITHOUT HEMATURIA: Primary | ICD-10-CM

## 2021-10-11 RX ORDER — LEVOFLOXACIN 500 MG/1
500 TABLET, FILM COATED ORAL EVERY 24 HOURS
Qty: 10 TABLET | Refills: 0 | Status: SHIPPED | OUTPATIENT
Start: 2021-10-11 | End: 2021-10-21

## 2021-10-15 ENCOUNTER — ANTICOAG VISIT (OUTPATIENT)
Dept: CARDIOLOGY CLINIC | Facility: CLINIC | Age: 56
End: 2021-10-15

## 2021-10-15 DIAGNOSIS — I35.9 AORTIC VALVE DISEASE: Primary | ICD-10-CM

## 2021-10-15 DIAGNOSIS — Z95.2 H/O AORTIC VALVE REPLACEMENT: ICD-10-CM

## 2021-10-15 LAB — INR PPP: 3.4 (ref 0.84–1.19)

## 2021-10-21 RX ORDER — CIPROFLOXACIN 500 MG/1
500 TABLET, FILM COATED ORAL DAILY
Status: ON HOLD | COMMUNITY
Start: 2021-10-06 | End: 2021-11-10 | Stop reason: ALTCHOICE

## 2021-10-22 ENCOUNTER — ANTICOAG VISIT (OUTPATIENT)
Dept: CARDIOLOGY CLINIC | Facility: CLINIC | Age: 56
End: 2021-10-22

## 2021-10-22 DIAGNOSIS — Z95.2 H/O AORTIC VALVE REPLACEMENT: ICD-10-CM

## 2021-10-22 DIAGNOSIS — I35.9 AORTIC VALVE DISEASE: Primary | ICD-10-CM

## 2021-10-22 LAB — INR PPP: 1.3 (ref 0.84–1.19)

## 2021-10-25 ENCOUNTER — OFFICE VISIT (OUTPATIENT)
Dept: SURGERY | Facility: CLINIC | Age: 56
End: 2021-10-25

## 2021-10-25 VITALS
SYSTOLIC BLOOD PRESSURE: 118 MMHG | DIASTOLIC BLOOD PRESSURE: 72 MMHG | WEIGHT: 166 LBS | HEART RATE: 75 BPM | BODY MASS INDEX: 28.34 KG/M2 | TEMPERATURE: 97.1 F | HEIGHT: 64 IN

## 2021-10-25 DIAGNOSIS — Z01.818 PRE-OP TESTING: Primary | ICD-10-CM

## 2021-10-25 DIAGNOSIS — K80.20 CALCULUS OF GALLBLADDER WITHOUT CHOLECYSTITIS WITHOUT OBSTRUCTION: Primary | ICD-10-CM

## 2021-10-25 PROCEDURE — PREOP: Performed by: SURGERY

## 2021-10-26 ENCOUNTER — ANTICOAG VISIT (OUTPATIENT)
Dept: CARDIOLOGY CLINIC | Facility: CLINIC | Age: 56
End: 2021-10-26
Payer: OTHER GOVERNMENT

## 2021-10-26 DIAGNOSIS — Z95.2 H/O AORTIC VALVE REPLACEMENT: ICD-10-CM

## 2021-10-26 DIAGNOSIS — I35.9 AORTIC VALVE DISEASE: Primary | ICD-10-CM

## 2021-10-26 LAB — INR PPP: 2 (ref 0.84–1.19)

## 2021-10-26 PROCEDURE — 93793 ANTICOAG MGMT PT WARFARIN: CPT | Performed by: INTERNAL MEDICINE

## 2021-11-02 ENCOUNTER — ANESTHESIA EVENT (OUTPATIENT)
Dept: PERIOP | Facility: HOSPITAL | Age: 56
End: 2021-11-02
Payer: OTHER GOVERNMENT

## 2021-11-03 LAB
ALBUMIN SERPL-MCNC: 4.7 G/DL (ref 3.6–5.1)
ALBUMIN SERPL-MCNC: 4.7 G/DL (ref 3.6–5.1)
ALBUMIN/GLOB SERPL: 1.9 (CALC) (ref 1–2.5)
ALBUMIN/GLOB SERPL: 1.9 (CALC) (ref 1–2.5)
ALP SERPL-CCNC: 54 U/L (ref 37–153)
ALP SERPL-CCNC: 54 U/L (ref 37–153)
ALT SERPL-CCNC: 33 U/L (ref 6–29)
ALT SERPL-CCNC: 33 U/L (ref 6–29)
AST SERPL-CCNC: 30 U/L (ref 10–35)
AST SERPL-CCNC: 30 U/L (ref 10–35)
BASOPHILS # BLD AUTO: 38 CELLS/UL (ref 0–200)
BASOPHILS NFR BLD AUTO: 0.7 %
BILIRUB DIRECT SERPL-MCNC: 0.2 MG/DL
BILIRUB INDIRECT SERPL-MCNC: 1.4 MG/DL (CALC) (ref 0.2–1.2)
BILIRUB SERPL-MCNC: 1.6 MG/DL (ref 0.2–1.2)
BILIRUB SERPL-MCNC: 1.6 MG/DL (ref 0.2–1.2)
BUN SERPL-MCNC: 9 MG/DL (ref 7–25)
BUN/CREAT SERPL: ABNORMAL (CALC) (ref 6–22)
CALCIUM SERPL-MCNC: 9.5 MG/DL (ref 8.6–10.4)
CHLORIDE SERPL-SCNC: 104 MMOL/L (ref 98–110)
CO2 SERPL-SCNC: 27 MMOL/L (ref 20–32)
CREAT SERPL-MCNC: 0.59 MG/DL (ref 0.5–1.05)
EOSINOPHIL # BLD AUTO: 270 CELLS/UL (ref 15–500)
EOSINOPHIL NFR BLD AUTO: 5 %
ERYTHROCYTE [DISTWIDTH] IN BLOOD BY AUTOMATED COUNT: 12.1 % (ref 11–15)
GLOBULIN SER CALC-MCNC: 2.5 G/DL (CALC) (ref 1.9–3.7)
GLOBULIN SER CALC-MCNC: 2.5 G/DL (CALC) (ref 1.9–3.7)
GLUCOSE SERPL-MCNC: 95 MG/DL (ref 65–99)
HCT VFR BLD AUTO: 38.7 % (ref 35–45)
HGB BLD-MCNC: 13.1 G/DL (ref 11.7–15.5)
LYMPHOCYTES # BLD AUTO: 1139 CELLS/UL (ref 850–3900)
LYMPHOCYTES NFR BLD AUTO: 21.1 %
MCH RBC QN AUTO: 31.5 PG (ref 27–33)
MCHC RBC AUTO-ENTMCNC: 33.9 G/DL (ref 32–36)
MCV RBC AUTO: 93 FL (ref 80–100)
MONOCYTES # BLD AUTO: 562 CELLS/UL (ref 200–950)
MONOCYTES NFR BLD AUTO: 10.4 %
NEUTROPHILS # BLD AUTO: 3391 CELLS/UL (ref 1500–7800)
NEUTROPHILS NFR BLD AUTO: 62.8 %
PLATELET # BLD AUTO: 239 THOUSAND/UL (ref 140–400)
PMV BLD REES-ECKER: 10.4 FL (ref 7.5–12.5)
POTASSIUM SERPL-SCNC: 4.1 MMOL/L (ref 3.5–5.3)
PROT SERPL-MCNC: 7.2 G/DL (ref 6.1–8.1)
PROT SERPL-MCNC: 7.2 G/DL (ref 6.1–8.1)
RBC # BLD AUTO: 4.16 MILLION/UL (ref 3.8–5.1)
SL AMB EGFR AFRICAN AMERICAN: 119 ML/MIN/1.73M2
SL AMB EGFR NON AFRICAN AMERICAN: 102 ML/MIN/1.73M2
SODIUM SERPL-SCNC: 139 MMOL/L (ref 135–146)
WBC # BLD AUTO: 5.4 THOUSAND/UL (ref 3.8–10.8)

## 2021-11-04 ENCOUNTER — ANTICOAG VISIT (OUTPATIENT)
Dept: CARDIOLOGY CLINIC | Facility: CLINIC | Age: 56
End: 2021-11-04

## 2021-11-04 DIAGNOSIS — Z95.2 H/O AORTIC VALVE REPLACEMENT: ICD-10-CM

## 2021-11-04 DIAGNOSIS — I35.9 AORTIC VALVE DISEASE: Primary | ICD-10-CM

## 2021-11-04 LAB — INR PPP: 1.9 (ref 0.84–1.19)

## 2021-11-10 ENCOUNTER — ANESTHESIA (OUTPATIENT)
Dept: PERIOP | Facility: HOSPITAL | Age: 56
End: 2021-11-10
Payer: OTHER GOVERNMENT

## 2021-11-10 ENCOUNTER — APPOINTMENT (OUTPATIENT)
Dept: RADIOLOGY | Facility: HOSPITAL | Age: 56
End: 2021-11-10
Payer: OTHER GOVERNMENT

## 2021-11-10 ENCOUNTER — HOSPITAL ENCOUNTER (OUTPATIENT)
Facility: HOSPITAL | Age: 56
Setting detail: OUTPATIENT SURGERY
Discharge: HOME | End: 2021-11-10
Attending: SURGERY | Admitting: SURGERY
Payer: OTHER GOVERNMENT

## 2021-11-10 VITALS
HEART RATE: 61 BPM | WEIGHT: 165.12 LBS | TEMPERATURE: 98.7 F | HEIGHT: 64 IN | OXYGEN SATURATION: 97 % | SYSTOLIC BLOOD PRESSURE: 129 MMHG | BODY MASS INDEX: 28.19 KG/M2 | RESPIRATION RATE: 18 BRPM | DIASTOLIC BLOOD PRESSURE: 58 MMHG

## 2021-11-10 DIAGNOSIS — K80.20 CALCULUS OF GALLBLADDER WITHOUT CHOLECYSTITIS WITHOUT OBSTRUCTION: Primary | ICD-10-CM

## 2021-11-10 LAB
INR PPP: 1.05 (ref 0.84–1.19)
PROTHROMBIN TIME: 13.5 SECONDS (ref 11.6–14.5)

## 2021-11-10 PROCEDURE — 47564 LAPARO CHOLECYSTECTOMY/EXPLR: CPT | Performed by: PHYSICIAN ASSISTANT

## 2021-11-10 PROCEDURE — S2900 ROBOTIC SURGICAL SYSTEM: HCPCS | Performed by: SURGERY

## 2021-11-10 PROCEDURE — 47564 LAPARO CHOLECYSTECTOMY/EXPLR: CPT | Performed by: SURGERY

## 2021-11-10 PROCEDURE — 85610 PROTHROMBIN TIME: CPT | Performed by: ANESTHESIOLOGY

## 2021-11-10 PROCEDURE — 88304 TISSUE EXAM BY PATHOLOGIST: CPT | Performed by: PATHOLOGY

## 2021-11-10 RX ORDER — GLYCOPYRROLATE 0.2 MG/ML
INJECTION INTRAMUSCULAR; INTRAVENOUS AS NEEDED
Status: DISCONTINUED | OUTPATIENT
Start: 2021-11-10 | End: 2021-11-10

## 2021-11-10 RX ORDER — ONDANSETRON 4 MG/1
4 TABLET, ORALLY DISINTEGRATING ORAL EVERY 8 HOURS PRN
Status: DISCONTINUED | OUTPATIENT
Start: 2021-11-10 | End: 2021-11-10 | Stop reason: HOSPADM

## 2021-11-10 RX ORDER — HYDROCODONE BITARTRATE AND ACETAMINOPHEN 5; 325 MG/1; MG/1
1 TABLET ORAL EVERY 6 HOURS PRN
Qty: 5 TABLET | Refills: 0 | Status: SHIPPED | OUTPATIENT
Start: 2021-11-10 | End: 2022-01-27 | Stop reason: ALTCHOICE

## 2021-11-10 RX ORDER — DEXAMETHASONE SODIUM PHOSPHATE 4 MG/ML
INJECTION, SOLUTION INTRA-ARTICULAR; INTRALESIONAL; INTRAMUSCULAR; INTRAVENOUS; SOFT TISSUE AS NEEDED
Status: DISCONTINUED | OUTPATIENT
Start: 2021-11-10 | End: 2021-11-10

## 2021-11-10 RX ORDER — MORPHINE SULFATE 10 MG/ML
2 INJECTION, SOLUTION INTRAMUSCULAR; INTRAVENOUS EVERY 2 HOUR PRN
Status: CANCELLED | OUTPATIENT
Start: 2021-11-10

## 2021-11-10 RX ORDER — CEFAZOLIN SODIUM 1 G/50ML
1000 SOLUTION INTRAVENOUS ONCE
Status: DISCONTINUED | OUTPATIENT
Start: 2021-11-10 | End: 2021-11-10

## 2021-11-10 RX ORDER — MIDAZOLAM HYDROCHLORIDE 2 MG/2ML
INJECTION, SOLUTION INTRAMUSCULAR; INTRAVENOUS AS NEEDED
Status: DISCONTINUED | OUTPATIENT
Start: 2021-11-10 | End: 2021-11-10

## 2021-11-10 RX ORDER — FENTANYL CITRATE/PF 50 MCG/ML
25 SYRINGE (ML) INJECTION
Status: DISCONTINUED | OUTPATIENT
Start: 2021-11-10 | End: 2021-11-10 | Stop reason: HOSPADM

## 2021-11-10 RX ORDER — INDOCYANINE GREEN AND WATER 25 MG
25 KIT INJECTION ONCE
Status: DISCONTINUED | OUTPATIENT
Start: 2021-11-10 | End: 2021-11-10 | Stop reason: HOSPADM

## 2021-11-10 RX ORDER — HYDROCODONE BITARTRATE AND ACETAMINOPHEN 5; 325 MG/1; MG/1
1 TABLET ORAL EVERY 6 HOURS PRN
Status: DISCONTINUED | OUTPATIENT
Start: 2021-11-10 | End: 2021-11-10 | Stop reason: HOSPADM

## 2021-11-10 RX ORDER — CLINDAMYCIN PHOSPHATE 900 MG/50ML
900 INJECTION INTRAVENOUS ONCE
Status: COMPLETED | OUTPATIENT
Start: 2021-11-10 | End: 2021-11-10

## 2021-11-10 RX ORDER — ACETAMINOPHEN 325 MG/1
650 TABLET ORAL EVERY 6 HOURS PRN
Status: DISCONTINUED | OUTPATIENT
Start: 2021-11-10 | End: 2021-11-10 | Stop reason: HOSPADM

## 2021-11-10 RX ORDER — SODIUM CHLORIDE, SODIUM LACTATE, POTASSIUM CHLORIDE, CALCIUM CHLORIDE 600; 310; 30; 20 MG/100ML; MG/100ML; MG/100ML; MG/100ML
INJECTION, SOLUTION INTRAVENOUS CONTINUOUS PRN
Status: DISCONTINUED | OUTPATIENT
Start: 2021-11-10 | End: 2021-11-10

## 2021-11-10 RX ORDER — HYDROMORPHONE HCL/PF 1 MG/ML
0.5 SYRINGE (ML) INJECTION
Status: DISCONTINUED | OUTPATIENT
Start: 2021-11-10 | End: 2021-11-10 | Stop reason: HOSPADM

## 2021-11-10 RX ORDER — DEXTROSE AND SODIUM CHLORIDE 5; .45 G/100ML; G/100ML
80 INJECTION, SOLUTION INTRAVENOUS CONTINUOUS
Status: DISCONTINUED | OUTPATIENT
Start: 2021-11-10 | End: 2021-11-10 | Stop reason: HOSPADM

## 2021-11-10 RX ORDER — HYDROMORPHONE HCL 110MG/55ML
PATIENT CONTROLLED ANALGESIA SYRINGE INTRAVENOUS AS NEEDED
Status: DISCONTINUED | OUTPATIENT
Start: 2021-11-10 | End: 2021-11-10

## 2021-11-10 RX ORDER — INDOCYANINE GREEN AND WATER 25 MG
25 KIT INJECTION ONCE
Status: COMPLETED | OUTPATIENT
Start: 2021-11-10 | End: 2021-11-10

## 2021-11-10 RX ORDER — ONDANSETRON 2 MG/ML
INJECTION INTRAMUSCULAR; INTRAVENOUS AS NEEDED
Status: DISCONTINUED | OUTPATIENT
Start: 2021-11-10 | End: 2021-11-10

## 2021-11-10 RX ORDER — LIDOCAINE HYDROCHLORIDE 20 MG/ML
INJECTION, SOLUTION EPIDURAL; INFILTRATION; INTRACAUDAL; PERINEURAL AS NEEDED
Status: DISCONTINUED | OUTPATIENT
Start: 2021-11-10 | End: 2021-11-10

## 2021-11-10 RX ORDER — ONDANSETRON 2 MG/ML
4 INJECTION INTRAMUSCULAR; INTRAVENOUS EVERY 8 HOURS PRN
Status: DISCONTINUED | OUTPATIENT
Start: 2021-11-10 | End: 2021-11-10 | Stop reason: HOSPADM

## 2021-11-10 RX ORDER — ONDANSETRON 2 MG/ML
4 INJECTION INTRAMUSCULAR; INTRAVENOUS ONCE AS NEEDED
Status: DISCONTINUED | OUTPATIENT
Start: 2021-11-10 | End: 2021-11-10 | Stop reason: HOSPADM

## 2021-11-10 RX ORDER — ROCURONIUM BROMIDE 10 MG/ML
INJECTION, SOLUTION INTRAVENOUS AS NEEDED
Status: DISCONTINUED | OUTPATIENT
Start: 2021-11-10 | End: 2021-11-10

## 2021-11-10 RX ORDER — PROPOFOL 10 MG/ML
INJECTION, EMULSION INTRAVENOUS AS NEEDED
Status: DISCONTINUED | OUTPATIENT
Start: 2021-11-10 | End: 2021-11-10

## 2021-11-10 RX ORDER — SODIUM CHLORIDE 9 MG/ML
125 INJECTION, SOLUTION INTRAVENOUS CONTINUOUS
Status: DISCONTINUED | OUTPATIENT
Start: 2021-11-10 | End: 2021-11-10 | Stop reason: HOSPADM

## 2021-11-10 RX ORDER — FENTANYL CITRATE 50 UG/ML
INJECTION, SOLUTION INTRAMUSCULAR; INTRAVENOUS AS NEEDED
Status: DISCONTINUED | OUTPATIENT
Start: 2021-11-10 | End: 2021-11-10

## 2021-11-10 RX ORDER — CLINDAMYCIN PHOSPHATE 900 MG/50ML
900 INJECTION INTRAVENOUS EVERY 8 HOURS
Status: DISCONTINUED | OUTPATIENT
Start: 2021-11-10 | End: 2021-11-10

## 2021-11-10 RX ORDER — NEOSTIGMINE METHYLSULFATE 1 MG/ML
INJECTION INTRAVENOUS AS NEEDED
Status: DISCONTINUED | OUTPATIENT
Start: 2021-11-10 | End: 2021-11-10

## 2021-11-10 RX ADMIN — FENTANYL CITRATE 100 MCG: 50 INJECTION INTRAMUSCULAR; INTRAVENOUS at 07:19

## 2021-11-10 RX ADMIN — ROCURONIUM BROMIDE 50 MG: 50 INJECTION, SOLUTION INTRAVENOUS at 07:20

## 2021-11-10 RX ADMIN — ONDANSETRON 4 MG: 2 INJECTION INTRAMUSCULAR; INTRAVENOUS at 12:44

## 2021-11-10 RX ADMIN — SODIUM CHLORIDE 125 ML/HR: 0.9 INJECTION, SOLUTION INTRAVENOUS at 06:02

## 2021-11-10 RX ADMIN — MIDAZOLAM 2 MG: 1 INJECTION INTRAMUSCULAR; INTRAVENOUS at 07:14

## 2021-11-10 RX ADMIN — SODIUM CHLORIDE, SODIUM LACTATE, POTASSIUM CHLORIDE, AND CALCIUM CHLORIDE: .6; .31; .03; .02 INJECTION, SOLUTION INTRAVENOUS at 07:34

## 2021-11-10 RX ADMIN — PROPOFOL 150 MG: 10 INJECTION, EMULSION INTRAVENOUS at 07:19

## 2021-11-10 RX ADMIN — ONDANSETRON 4 MG: 2 INJECTION INTRAMUSCULAR; INTRAVENOUS at 08:40

## 2021-11-10 RX ADMIN — FENTANYL CITRATE 25 MCG: 50 INJECTION INTRAMUSCULAR; INTRAVENOUS at 09:33

## 2021-11-10 RX ADMIN — ROCURONIUM BROMIDE 10 MG: 50 INJECTION, SOLUTION INTRAVENOUS at 08:25

## 2021-11-10 RX ADMIN — GLYCOPYRROLATE 0.4 MG: 0.2 INJECTION, SOLUTION INTRAMUSCULAR; INTRAVENOUS at 08:54

## 2021-11-10 RX ADMIN — FENTANYL CITRATE 25 MCG: 50 INJECTION INTRAMUSCULAR; INTRAVENOUS at 09:37

## 2021-11-10 RX ADMIN — CLINDAMYCIN PHOSPHATE 900 MG: 900 INJECTION, SOLUTION INTRAVENOUS at 07:14

## 2021-11-10 RX ADMIN — DEXAMETHASONE SODIUM PHOSPHATE 4 MG: 4 INJECTION INTRA-ARTICULAR; INTRALESIONAL; INTRAMUSCULAR; INTRAVENOUS; SOFT TISSUE at 07:26

## 2021-11-10 RX ADMIN — FENTANYL CITRATE 100 MCG: 50 INJECTION INTRAMUSCULAR; INTRAVENOUS at 07:39

## 2021-11-10 RX ADMIN — LIDOCAINE HYDROCHLORIDE 100 MG: 20 INJECTION, SOLUTION EPIDURAL; INFILTRATION; INTRACAUDAL; PERINEURAL at 07:19

## 2021-11-10 RX ADMIN — NEOSTIGMINE METHYLSULFATE 3 MG: 1 INJECTION INTRAVENOUS at 08:54

## 2021-11-10 RX ADMIN — INDOCYANINE GREEN AND WATER 25 MG: KIT at 06:13

## 2021-11-10 RX ADMIN — HYDROMORPHONE HYDROCHLORIDE 0.5 MG: 2 INJECTION INTRAMUSCULAR; INTRAVENOUS; SUBCUTANEOUS at 09:00

## 2021-11-11 ENCOUNTER — TELEPHONE (OUTPATIENT)
Dept: SURGERY | Facility: CLINIC | Age: 56
End: 2021-11-11

## 2021-11-15 ENCOUNTER — ANTICOAG VISIT (OUTPATIENT)
Dept: CARDIOLOGY CLINIC | Facility: CLINIC | Age: 56
End: 2021-11-15

## 2021-11-15 ENCOUNTER — TELEPHONE (OUTPATIENT)
Dept: CARDIOLOGY CLINIC | Facility: CLINIC | Age: 56
End: 2021-11-15

## 2021-11-15 DIAGNOSIS — Z95.2 S/P AVR (AORTIC VALVE REPLACEMENT): ICD-10-CM

## 2021-11-15 DIAGNOSIS — I35.9 AORTIC VALVE DISEASE: Primary | ICD-10-CM

## 2021-11-15 DIAGNOSIS — Z95.2 H/O AORTIC VALVE REPLACEMENT: Primary | ICD-10-CM

## 2021-11-15 DIAGNOSIS — Z95.2 H/O AORTIC VALVE REPLACEMENT: ICD-10-CM

## 2021-11-15 LAB — INR PPP: 1.2 (ref 0.84–1.19)

## 2021-11-18 ENCOUNTER — ANTICOAG VISIT (OUTPATIENT)
Dept: CARDIOLOGY CLINIC | Facility: CLINIC | Age: 56
End: 2021-11-18

## 2021-11-18 ENCOUNTER — APPOINTMENT (OUTPATIENT)
Dept: LAB | Facility: CLINIC | Age: 56
End: 2021-11-18
Payer: OTHER GOVERNMENT

## 2021-11-18 DIAGNOSIS — I35.9 AORTIC VALVE DISEASE: Primary | ICD-10-CM

## 2021-11-18 DIAGNOSIS — Z95.2 H/O AORTIC VALVE REPLACEMENT: ICD-10-CM

## 2021-11-18 DIAGNOSIS — Z95.2 S/P AVR (AORTIC VALVE REPLACEMENT): ICD-10-CM

## 2021-11-18 LAB
INR PPP: 1.41 (ref 0.84–1.19)
PROTHROMBIN TIME: 16.6 SECONDS (ref 11.6–14.5)

## 2021-11-18 PROCEDURE — 36415 COLL VENOUS BLD VENIPUNCTURE: CPT

## 2021-11-18 PROCEDURE — 85610 PROTHROMBIN TIME: CPT

## 2021-11-22 ENCOUNTER — APPOINTMENT (OUTPATIENT)
Dept: LAB | Facility: CLINIC | Age: 56
End: 2021-11-22
Payer: OTHER GOVERNMENT

## 2021-11-22 ENCOUNTER — ANTICOAG VISIT (OUTPATIENT)
Dept: CARDIOLOGY CLINIC | Facility: CLINIC | Age: 56
End: 2021-11-22

## 2021-11-22 DIAGNOSIS — Z95.2 H/O AORTIC VALVE REPLACEMENT: ICD-10-CM

## 2021-11-22 DIAGNOSIS — I35.9 AORTIC VALVE DISEASE: Primary | ICD-10-CM

## 2021-11-22 LAB
INR PPP: 1.94 (ref 0.84–1.19)
PROTHROMBIN TIME: 21.2 SECONDS (ref 11.6–14.5)

## 2021-11-22 PROCEDURE — 85610 PROTHROMBIN TIME: CPT

## 2021-11-22 PROCEDURE — 36415 COLL VENOUS BLD VENIPUNCTURE: CPT

## 2021-11-29 LAB — INR PPP: 2.9 (ref 0.84–1.19)

## 2021-11-30 ENCOUNTER — ANTICOAG VISIT (OUTPATIENT)
Dept: CARDIOLOGY CLINIC | Facility: CLINIC | Age: 56
End: 2021-11-30
Payer: OTHER GOVERNMENT

## 2021-11-30 DIAGNOSIS — I35.9 AORTIC VALVE DISEASE: Primary | ICD-10-CM

## 2021-11-30 DIAGNOSIS — Z95.2 H/O AORTIC VALVE REPLACEMENT: ICD-10-CM

## 2021-11-30 PROCEDURE — 93793 ANTICOAG MGMT PT WARFARIN: CPT | Performed by: INTERNAL MEDICINE

## 2021-12-02 ENCOUNTER — OFFICE VISIT (OUTPATIENT)
Dept: SURGERY | Facility: CLINIC | Age: 56
End: 2021-12-02

## 2021-12-02 VITALS
DIASTOLIC BLOOD PRESSURE: 80 MMHG | SYSTOLIC BLOOD PRESSURE: 130 MMHG | HEIGHT: 64 IN | TEMPERATURE: 97.9 F | WEIGHT: 165.2 LBS | HEART RATE: 71 BPM | BODY MASS INDEX: 28.2 KG/M2

## 2021-12-02 DIAGNOSIS — K80.20 CALCULUS OF GALLBLADDER WITHOUT CHOLECYSTITIS WITHOUT OBSTRUCTION: Primary | ICD-10-CM

## 2021-12-02 PROCEDURE — 99024 POSTOP FOLLOW-UP VISIT: CPT | Performed by: SURGERY

## 2021-12-17 ENCOUNTER — ANTICOAG VISIT (OUTPATIENT)
Dept: CARDIOLOGY CLINIC | Facility: CLINIC | Age: 56
End: 2021-12-17

## 2021-12-17 DIAGNOSIS — I35.9 AORTIC VALVE DISEASE: Primary | ICD-10-CM

## 2021-12-17 DIAGNOSIS — Z95.2 H/O AORTIC VALVE REPLACEMENT: ICD-10-CM

## 2021-12-17 LAB — INR PPP: 2.1 (ref 0.84–1.19)

## 2022-01-13 DIAGNOSIS — Z95.2 H/O AORTIC VALVE REPLACEMENT: ICD-10-CM

## 2022-01-13 RX ORDER — CLINDAMYCIN HYDROCHLORIDE 300 MG/1
CAPSULE ORAL
Qty: 2 CAPSULE | Refills: 3 | Status: SHIPPED | OUTPATIENT
Start: 2022-01-13 | End: 2022-07-13 | Stop reason: SDUPTHER

## 2022-01-14 ENCOUNTER — ANTICOAG VISIT (OUTPATIENT)
Dept: CARDIOLOGY CLINIC | Facility: CLINIC | Age: 57
End: 2022-01-14
Payer: OTHER GOVERNMENT

## 2022-01-14 DIAGNOSIS — I35.9 AORTIC VALVE DISEASE: Primary | ICD-10-CM

## 2022-01-14 DIAGNOSIS — Z95.2 H/O AORTIC VALVE REPLACEMENT: ICD-10-CM

## 2022-01-14 LAB — INR PPP: 1.9 (ref 0.84–1.19)

## 2022-01-14 PROCEDURE — 93793 ANTICOAG MGMT PT WARFARIN: CPT | Performed by: INTERNAL MEDICINE

## 2022-01-14 NOTE — PROGRESS NOTES
Spoke with patient, states her INR last week was 1 5, states she took full dose (5 mg) on 1/7 and went back to normal dose  Advised to take 5 mg tonight and then routine dosing, will recheck next week

## 2022-01-26 ENCOUNTER — TELEPHONE (OUTPATIENT)
Dept: FAMILY MEDICINE CLINIC | Facility: CLINIC | Age: 57
End: 2022-01-26

## 2022-01-26 LAB — SARS-COV-2 AG UPPER RESP QL IA: ABNORMAL

## 2022-01-26 NOTE — TELEPHONE ENCOUNTER
----- Message from John Marino sent at 1/26/2022  5:38 PM EST -----  Regarding: John Marino Positive Covid test  Please see attached Positive Covid test results

## 2022-01-27 ENCOUNTER — TELEMEDICINE (OUTPATIENT)
Dept: FAMILY MEDICINE CLINIC | Facility: CLINIC | Age: 57
End: 2022-01-27
Payer: OTHER GOVERNMENT

## 2022-01-27 VITALS — BODY MASS INDEX: 28.22 KG/M2 | HEIGHT: 64 IN | WEIGHT: 165.3 LBS

## 2022-01-27 DIAGNOSIS — U07.1 COVID-19 VIRUS INFECTION: Primary | ICD-10-CM

## 2022-01-27 PROCEDURE — 99442 PR PHYS/QHP TELEPHONE EVALUATION 11-20 MIN: CPT | Performed by: FAMILY MEDICINE

## 2022-01-27 NOTE — ASSESSMENT & PLAN NOTE
She is still having problems with COVID related symptoms  Should stay home for now  Can return to work after 5 d of improvement, and 24 hours without fever/chills

## 2022-01-27 NOTE — PATIENT INSTRUCTIONS
Problem List Items Addressed This Visit     COVID-19 virus infection - Primary     She is still having problems with COVID related symptoms  Should stay home for now  Can return to work after 5 d of improvement, and 24 hours without fever/chills

## 2022-01-27 NOTE — PROGRESS NOTES
COVID-19 Outpatient Progress Note    Assessment/Plan:    Problem List Items Addressed This Visit     COVID-19 virus infection - Primary     She is still having problems with COVID related symptoms  Should stay home for now  Can return to work after 5 d of improvement, and 24 hours without fever/chills  Disposition:     Patient has COVID-19 infection  Based off CDC guidelines, they were recommended to isolate for 5 days from the date of the positive test  If they remain asymptomatic, isolation may be ended followed by 5 days of wearing a mask when around othes to minimize risk of infecting others  If they have a fever, continue to stay home until fever resolves for at least 24 hours  I have spent 20 minutes directly with the patient  Encounter provider Homer Shultz MD    Provider located at [address]  [address] [address] [address] [phone]    Recent Visits  Date Type Provider Dept   01/26/22 Telephone Sandro Cruz, [facility]   Showing recent visits within past 7 days and meeting all other requirements  Today's Visits  Date Type Provider Dept   01/27/22 Telemedicine Homer Shultz MD [facility]   Showing today's visits and meeting all other requirements  Future Appointments  No visits were found meeting these conditions  Showing future appointments within next 150 days and meeting all other requirements     This virtual check-in was done via telephone and she agrees to proceed  Patient agrees to participate in a virtual check in via telephone or video visit instead of presenting to the office to address urgent/immediate medical needs  Patient is aware this is a billable service  After connecting through Telephone, the patient was identified by name and date of birth   Carolina Din was informed that this was a telemedicine visit and that the exam was being conducted confidentially over secure lines  My office door was closed  No one else was in the room  Lorene Harp acknowledged consent and understanding of privacy and security of the telemedicine visit  I informed the patient that I have reviewed her record in Epic and presented the opportunity for her to ask any questions regarding the visit today  The patient agreed to participate  It was my intent to perform this visit via video technology but the patient was not able to do a video connection so the visit was completed via audio telephone only  Verification of patient location:  Patient is located in the following state in which I hold an active license: [place]    Subjective:   Lorene Harp is a 61 y o  female who has been screened for COVID-19  Symptom change since last report: worsening  Patient's symptoms include fatigue (some), nasal congestion, cough (some) and chest tightness  Patient denies fever, chills, malaise, rhinorrhea, sore throat, anosmia, loss of taste, shortness of breath, abdominal pain, nausea, vomiting, diarrhea, myalgias and headaches  - Date of symptom onset: 1/25/2022  - Date of positive COVID-19 test: 1/26/2022  Type of test: PCR  COVID-19 vaccination status: Fully vaccinated (primary series)    Bill Aquino has been staying home and has isolated themselves in her home  She is taking care to not share personal items and is cleaning all surfaces that are touched often, like counters, tabletops, and doorknobs using household cleaning sprays or wipes  She is wearing a mask when she leaves her room  Patient has a PCR pos test  [facility]  Has symptoms Tuesday  Runny nose that did not go away  Was tested at work  Was positive  Chest tightness/throat congestion  Can take deep breaths        Lab Results   Component Value Date    [facility] Positive (Patient Reported) (A) 01/26/2022     Past Medical History:   Diagnosis Date    Anxiety     Aortic valve replaced     Gallstones     History of transfusion     Hyperlipidemia     Kidney stone     Nephrolithiasis     Palpitations     PONV (postoperative nausea and vomiting)     UTI (urinary tract infection) 10/2021     Past Surgical History:   Procedure Laterality Date    [facility]  [facility]    CARDIAC SURGERY      ME LAP,CHOLECYSTECTOMY N/A 11/10/2021    Procedure: CHOLECYSTECTOMY LAPAROSCOPIC W/ ROBOTICS;  Surgeon: Argentina Terrazas MD;  Location: [facility] OR;  Service: General     Current Outpatient Medications   Medication Sig Dispense Refill    ALPRAZolam (XANAX) 0 25 mg tablet Take 1 tablet (0 25 mg total) by mouth see administration instructions One to two tablets daily prn palpitations 45 tablet 2    clindamycin (CLEOCIN) 300 MG capsule Two capsule 1 hour prior to dental work(600mg) as directed by MD  2 capsule 3    pravastatin (PRAVACHOL) 10 mg tablet Take 1 tablet (10 mg total) by mouth daily at bedtime 90 tablet 3    warfarin (COUMADIN) 5 mg tablet TAKE 1 TABLET BY MOUTH ONCE DAILY AS DIRECTED (Patient taking differently: 5mg on Tuesday 2 5 all the other days ) 90 tablet 1     No current facility-administered medications for this visit  Allergies   Allergen Reactions    Erythromycin Nausea Only and Vomiting    Penicillins Hives    Vicodin  [Hydrocodone-Acetaminophen] Dizziness, Nausea Only and Other (See Comments)       Review of Systems   Constitutional: Positive for fatigue (some)  Negative for appetite change, chills, diaphoresis and fever  HENT: Positive for congestion  Negative for ear pain, facial swelling, postnasal drip, rhinorrhea, sinus pressure, sinus pain, sore throat, trouble swallowing and voice change  Runny nose   Respiratory: Positive for cough (some) and chest tightness  Negative for shortness of breath and wheezing  Cardiovascular: Negative for chest pain  Gastrointestinal: Negative for abdominal pain, diarrhea, nausea and vomiting     Musculoskeletal: Negative for myalgias  Neurological: Negative for headaches  Objective:    Vitals:    01/27/22 0814   Weight: 75 kg (165 lb 4 8 oz)   Height: 5' 4" (1 626 m)       Physical Exam  Vitals and nursing note reviewed  Constitutional:       General: She is not in acute distress  Appearance: She is well-developed  Pulmonary:      Effort: Pulmonary effort is normal       Breath sounds: Normal breath sounds  VIRTUAL VISIT DISCLAIMER    Eduin Parker verbally agrees to participate in [employer]  Pt is aware that [employer] could be limited without vital signs or the ability to perform a full hands-on physical Cheral Clarity understands she or the provider may request at any time to terminate the video visit and request the patient to seek care or treatment in person

## 2022-01-31 ENCOUNTER — TELEMEDICINE (OUTPATIENT)
Dept: FAMILY MEDICINE CLINIC | Facility: CLINIC | Age: 57
End: 2022-01-31
Payer: OTHER GOVERNMENT

## 2022-01-31 DIAGNOSIS — U07.1 COVID-19 VIRUS INFECTION: Primary | ICD-10-CM

## 2022-01-31 PROCEDURE — 99213 OFFICE O/P EST LOW 20 MIN: CPT | Performed by: FAMILY MEDICINE

## 2022-01-31 NOTE — PATIENT INSTRUCTIONS
Problem List Items Addressed This Visit     COVID-19 virus infection - Primary     Patient reports that she is doing much better now than what she was  Really no symptoms today  Only minimal problems  Some minor cough, nasal congestion congestion, and some minor fatigue  As far as vaccine would be concerned, booster can be given after 3 months  Follow-up in the office as needed  COVID 19 Instructions    Twanhany Bailey was advised to limit contact with others to essential tasks such as getting food, medications, and medical care  Proper handwashing reviewed, and Hand sanitzer when washing is not available  If the patient develops symptoms of COVID 19, the patient should call the office as soon as possible  For 1330-3792 Flu season, it is strongly recommended that Flu Vaccinations be obtained  Virtual Visits:  Lynsey: This works on smart phones (any phone with Internet browsing capability)  You should get a text message when the provider is ready to see you  Click on the link in the text message, and the call should start  You will need to type in your name, and allow camera and microphone access  This is HIPPA compliant, and secure  If you have not already done so, get immunized to COVID 19  We are committed to getting you vaccinated as soon as possible and will be closely following CDC and [facility]  guidelines as they are released and revised  Please refer to our COVID-19 vaccine webpage for the most up to date information on the vaccine and our distribution efforts  This site will also have the most up to date recommendations for COVID booster vaccine      Zina broderick    Call [phone] ([phone]), option 7    OUR NEW LOCATION:    [address], [address] [place], [place] [address] [address] , [place], [address]  Fax: [phone]    Lab services and OB/GYN are at this location as well

## 2022-01-31 NOTE — ASSESSMENT & PLAN NOTE
Patient reports that she is doing much better now than what she was  Really no symptoms today  Only minimal problems  Some minor cough, nasal congestion congestion, and some minor fatigue  As far as vaccine would be concerned, booster can be given after 3 months  Follow-up in the office as needed

## 2022-01-31 NOTE — PROGRESS NOTES
COVID-19 Outpatient Progress Note    Assessment/Plan:    Problem List Items Addressed This Visit     COVID-19 virus infection - Primary     Patient reports that she is doing much better now than what she was  Really no symptoms today  Only minimal problems  Some minor cough, nasal congestion congestion, and some minor fatigue  As far as vaccine would be concerned, booster can be given after 3 months  Follow-up in the office as needed  Disposition:     Patient has COVID-19 infection  Based off CDC guidelines, they were recommended to isolate for 5 days from the date of the positive test  If they remain asymptomatic, isolation may be ended followed by 5 days of wearing a mask when around othes to minimize risk of infecting others  If they have a fever, continue to stay home until fever resolves for at least 24 hours  I have spent 15 minutes directly with the patient  Encounter provider Zia Grijalva MD    Provider located at [address]  [address] [address] [address] [phone]    Recent Visits  Date Type Provider Dept   01/27/22 [address], MD [facility]   01/26/22 Telephone Jaden Temple, [address]   Showing recent visits within past 7 days and meeting all other requirements  Today's Visits  Date Type Provider Dept   01/31/22 Telemedicine Zia Grijalva MD [facility]   Showing today's visits and meeting all other requirements  Future Appointments  No visits were found meeting these conditions  Showing future appointments within next 150 days and meeting all other requirements     This virtual check-in was done via [facility] and patient was informed that this is a secure, HIPAA-compliant platform  She agrees to proceed      Patient agrees to participate in a virtual check in via telephone or video visit instead of presenting to the office to address urgent/immediate medical needs  Patient is aware this is a billable service  After connecting through [facility], the patient was identified by name and date of birth  Moe Eden was informed that this was a telemedicine visit and that the exam was being conducted confidentially over secure lines  My office door was closed  No one else was in the room  Moe Eden acknowledged consent and understanding of privacy and security of the telemedicine visit  I informed the patient that I have reviewed her record in Epic and presented the opportunity for her to ask any questions regarding the visit today  The patient agreed to participate  Verification of patient location:  Patient is located in the following state in which I hold an active license: [place]    Subjective:   Moe Eden is a 61 y o  female who has been screened for COVID-19  Symptom change since last report: improving  Patient's symptoms include fatigue (minor) and nasal congestion  Patient denies fever, chills, malaise, rhinorrhea, sore throat, anosmia, loss of taste, cough (minor), shortness of breath, chest tightness, abdominal pain, nausea, vomiting, diarrhea, myalgias and headaches  - Date of symptom onset: 1/25/2022  - Date of positive COVID-19 test: 1/26/2022  Type of test: Home antigen  COVID-19 vaccination status: Fully vaccinated (primary series)    Adina Shafer has been staying home and has isolated themselves in her home  She is taking care to not share personal items and is cleaning all surfaces that are touched often, like counters, tabletops, and doorknobs using household cleaning sprays or wipes  She is wearing a mask when she leaves her room  Patient feels she is much improved versus before          Lab Results   Component Value Date    [facility] Positive (Patient Reported) (A) 01/26/2022     Past Medical History:   Diagnosis Date    Anxiety     Aortic valve replaced     Gallstones     History of transfusion     Hyperlipidemia     Kidney stone     Nephrolithiasis     Palpitations     PONV (postoperative nausea and vomiting)     UTI (urinary tract infection) 10/2021     Past Surgical History:   Procedure Laterality Date    [facility]IC [facility]  [facility]    CARDIAC SURGERY      OR LAP,CHOLECYSTECTOMY N/A 11/10/2021    Procedure: CHOLECYSTECTOMY LAPAROSCOPIC W/ ROBOTICS;  Surgeon: Amada Huitron MD;  Location: [facility] OR;  Service: General     Current Outpatient Medications   Medication Sig Dispense Refill    ALPRAZolam (XANAX) 0 25 mg tablet Take 1 tablet (0 25 mg total) by mouth see administration instructions One to two tablets daily prn palpitations 45 tablet 2    clindamycin (CLEOCIN) 300 MG capsule Two capsule 1 hour prior to dental work(600mg) as directed by MD  2 capsule 3    pravastatin (PRAVACHOL) 10 mg tablet Take 1 tablet (10 mg total) by mouth daily at bedtime 90 tablet 3    warfarin (COUMADIN) 5 mg tablet TAKE 1 TABLET BY MOUTH ONCE DAILY AS DIRECTED (Patient taking differently: 5mg on Tuesday 2 5 all the other days ) 90 tablet 1     No current facility-administered medications for this visit  Allergies   Allergen Reactions    Erythromycin Nausea Only and Vomiting    Penicillins Hives    Vicodin  [Hydrocodone-Acetaminophen] Dizziness, Nausea Only and Other (See Comments)       Review of Systems   Constitutional: Positive for fatigue (minor)  Negative for chills and fever  HENT: Positive for congestion  Negative for rhinorrhea and sore throat  Respiratory: Negative for cough (minor), chest tightness and shortness of breath  Gastrointestinal: Negative for abdominal pain, diarrhea, nausea and vomiting  Musculoskeletal: Negative for myalgias  Neurological: Negative for headaches  Objective: There were no vitals filed for this visit  Physical Exam    VIRTUAL VISIT DISCLAIMER    Rachel Le verbally agrees to participate in [employer]   Pt is aware that [employer] could be limited without vital signs or the ability to perform a full hands-on physical Jamar  understands she or the provider may request at any time to terminate the video visit and request the patient to seek care or treatment in person

## 2022-02-03 ENCOUNTER — TELEPHONE (OUTPATIENT)
Dept: OTHER | Facility: OTHER | Age: 57
End: 2022-02-03

## 2022-02-03 ENCOUNTER — TELEPHONE (OUTPATIENT)
Dept: CARDIOLOGY CLINIC | Facility: CLINIC | Age: 57
End: 2022-02-03

## 2022-02-03 NOTE — TELEPHONE ENCOUNTER
Left message for patient, advised INR over due, advised to complete ASAP  Advised to call office with questions or concerns

## 2022-02-04 ENCOUNTER — ANTICOAG VISIT (OUTPATIENT)
Dept: CARDIOLOGY CLINIC | Facility: CLINIC | Age: 57
End: 2022-02-04
Payer: OTHER GOVERNMENT

## 2022-02-04 DIAGNOSIS — I35.9 AORTIC VALVE DISEASE: Primary | ICD-10-CM

## 2022-02-04 DIAGNOSIS — Z95.2 H/O AORTIC VALVE REPLACEMENT: ICD-10-CM

## 2022-02-04 LAB — INR PPP: 1.6 (ref 0.84–1.19)

## 2022-02-04 PROCEDURE — 93793 ANTICOAG MGMT PT WARFARIN: CPT | Performed by: INTERNAL MEDICINE

## 2022-02-04 NOTE — TELEPHONE ENCOUNTER
Lab Result: 1 6 out of range inr   Date/Time Drawn: 2/3/2022 4:17 pst   Ordering Provider: Dr Rona Fatima from [facility]

## 2022-02-04 NOTE — PROGRESS NOTES
Left message for patient to call office, INR low, will need to discuss dosing  Will need to check if on call doctor called her last night

## 2022-02-08 NOTE — PROGRESS NOTES
Spoke with patient, advised INR low, she states that night 2/3/22, she took 5 mg  Will have patient take 5 mg Tues and Thurs, 2 5 mg other days   Will recheck in 2 weeks 2/17/22

## 2022-02-23 LAB — INR PPP: 2 (ref 0.84–1.19)

## 2022-02-24 ENCOUNTER — ANTICOAG VISIT (OUTPATIENT)
Dept: CARDIOLOGY CLINIC | Facility: CLINIC | Age: 57
End: 2022-02-24
Payer: OTHER GOVERNMENT

## 2022-02-24 DIAGNOSIS — Z95.2 H/O AORTIC VALVE REPLACEMENT: ICD-10-CM

## 2022-02-24 DIAGNOSIS — I35.9 AORTIC VALVE DISEASE: Primary | ICD-10-CM

## 2022-02-24 PROCEDURE — 93793 ANTICOAG MGMT PT WARFARIN: CPT | Performed by: INTERNAL MEDICINE

## 2022-02-24 NOTE — PROGRESS NOTES
Spoke with patient, advised INR good, will continue same dose 5 mg Tues, Thurs, 5 mg other days     Will recheck 2 weeks 3/9/22    Patient is a home xavier

## 2022-03-22 ENCOUNTER — TELEPHONE (OUTPATIENT)
Dept: OTHER | Facility: OTHER | Age: 57
End: 2022-03-22

## 2022-03-22 ENCOUNTER — TELEPHONE (OUTPATIENT)
Dept: CARDIOLOGY CLINIC | Facility: CLINIC | Age: 57
End: 2022-03-22

## 2022-03-22 NOTE — TELEPHONE ENCOUNTER
Lab Result: INR 1 7   Date/Time Drawn: 3/22/2022 7:03 pm   Ordering Provider: Dr Michael Schroeder Name: Christine Asher        The following critical/stat result was read back to the lab as stated above and [employer] to the on-call provider

## 2022-03-22 NOTE — TELEPHONE ENCOUNTER
Left message for patient, advised INR overdue, received notice from [employer], advised to have completed ASAP  Advised to call office with questions or concerns

## 2022-03-23 ENCOUNTER — ANTICOAG VISIT (OUTPATIENT)
Dept: CARDIOLOGY CLINIC | Facility: CLINIC | Age: 57
End: 2022-03-23
Payer: OTHER GOVERNMENT

## 2022-03-23 DIAGNOSIS — Z95.2 H/O AORTIC VALVE REPLACEMENT: ICD-10-CM

## 2022-03-23 DIAGNOSIS — I35.9 AORTIC VALVE DISEASE: Primary | ICD-10-CM

## 2022-03-23 LAB — INR PPP: 1.7 (ref 0.84–1.19)

## 2022-03-23 PROCEDURE — 93793 ANTICOAG MGMT PT WARFARIN: CPT | Performed by: INTERNAL MEDICINE

## 2022-03-23 NOTE — PROGRESS NOTES
Spoke with patient, advised INR low, states she may have missed a dose, and she ate more salads last week  Will have patient take 5 mg Mon Wed Fri, 2 5 mg other days  And recheck in 1 week    Advised patient to try to keep a steady diet      Patient is a home xavier

## 2022-04-04 ENCOUNTER — ANTICOAG VISIT (OUTPATIENT)
Dept: CARDIOLOGY CLINIC | Facility: CLINIC | Age: 57
End: 2022-04-04
Payer: OTHER GOVERNMENT

## 2022-04-04 DIAGNOSIS — I35.9 AORTIC VALVE DISEASE: Primary | ICD-10-CM

## 2022-04-04 DIAGNOSIS — Z95.2 H/O AORTIC VALVE REPLACEMENT: ICD-10-CM

## 2022-04-04 LAB — INR PPP: 2.2 (ref 0.84–1.19)

## 2022-04-04 PROCEDURE — 93793 ANTICOAG MGMT PT WARFARIN: CPT | Performed by: INTERNAL MEDICINE

## 2022-04-04 NOTE — PROGRESS NOTES
Left message for patient, advised INR good, will continue same dose 5 mg Mon Wed Fri, 2 5 mg all other days  Will recheck in 2 weeks 4/15/22  Advised to call office with questions or concerns  Patient is a home xavier

## 2022-04-25 ENCOUNTER — ANTICOAG VISIT (OUTPATIENT)
Dept: CARDIOLOGY CLINIC | Facility: CLINIC | Age: 57
End: 2022-04-25
Payer: OTHER GOVERNMENT

## 2022-04-25 DIAGNOSIS — I35.9 AORTIC VALVE DISEASE: Primary | ICD-10-CM

## 2022-04-25 DIAGNOSIS — Z95.2 H/O AORTIC VALVE REPLACEMENT: ICD-10-CM

## 2022-04-25 LAB — INR PPP: 2.7 (ref 0.84–1.19)

## 2022-04-25 PROCEDURE — 93793 ANTICOAG MGMT PT WARFARIN: CPT | Performed by: INTERNAL MEDICINE

## 2022-05-04 ENCOUNTER — ANTICOAG VISIT (OUTPATIENT)
Dept: CARDIOLOGY CLINIC | Facility: CLINIC | Age: 57
End: 2022-05-04
Payer: OTHER GOVERNMENT

## 2022-05-04 DIAGNOSIS — I35.9 AORTIC VALVE DISEASE: Primary | ICD-10-CM

## 2022-05-04 DIAGNOSIS — Z95.2 H/O AORTIC VALVE REPLACEMENT: ICD-10-CM

## 2022-05-04 LAB — INR PPP: 2.7 (ref 0.84–1.19)

## 2022-05-04 PROCEDURE — 93793 ANTICOAG MGMT PT WARFARIN: CPT | Performed by: INTERNAL MEDICINE

## 2022-05-04 NOTE — PROGRESS NOTES
Left message for patient, advised INR remains good, will continue 5 mg Mon Wed Fri, 2 5 mg all other days  Will recheck in 2 weeks 5/17/22  Advised to call with questions or concerns  Patient is a home xavier

## 2022-05-13 DIAGNOSIS — Z95.2 S/P AVR (AORTIC VALVE REPLACEMENT): ICD-10-CM

## 2022-05-13 RX ORDER — WARFARIN SODIUM 5 MG/1
5 TABLET ORAL DAILY
Qty: 90 TABLET | Refills: 1 | Status: SHIPPED | OUTPATIENT
Start: 2022-05-13

## 2022-05-31 ENCOUNTER — ANTICOAG VISIT (OUTPATIENT)
Dept: CARDIOLOGY CLINIC | Facility: CLINIC | Age: 57
End: 2022-05-31
Payer: OTHER GOVERNMENT

## 2022-05-31 DIAGNOSIS — Z95.2 H/O AORTIC VALVE REPLACEMENT: ICD-10-CM

## 2022-05-31 DIAGNOSIS — I35.9 AORTIC VALVE DISEASE: Primary | ICD-10-CM

## 2022-05-31 LAB — INR PPP: 2 (ref 0.84–1.19)

## 2022-05-31 PROCEDURE — 93793 ANTICOAG MGMT PT WARFARIN: CPT | Performed by: INTERNAL MEDICINE

## 2022-05-31 NOTE — PROGRESS NOTES
Left message for patient, advised INR from last week was good, continue 5 mg Mon Wed Fri, 2 5 mg all other days, will recheck next week, 6/7/22  Advised to call with questions or concerns       Patient is a home xavier

## 2022-06-14 ENCOUNTER — ANTICOAG VISIT (OUTPATIENT)
Dept: CARDIOLOGY CLINIC | Facility: CLINIC | Age: 57
End: 2022-06-14

## 2022-06-14 ENCOUNTER — OFFICE VISIT (OUTPATIENT)
Dept: CARDIOLOGY CLINIC | Facility: CLINIC | Age: 57
End: 2022-06-14
Payer: OTHER GOVERNMENT

## 2022-06-14 VITALS
SYSTOLIC BLOOD PRESSURE: 118 MMHG | DIASTOLIC BLOOD PRESSURE: 60 MMHG | HEART RATE: 70 BPM | WEIGHT: 172 LBS | HEIGHT: 64 IN | BODY MASS INDEX: 29.37 KG/M2

## 2022-06-14 DIAGNOSIS — R00.2 PALPITATIONS: ICD-10-CM

## 2022-06-14 DIAGNOSIS — I49.3 PVCS (PREMATURE VENTRICULAR CONTRACTIONS): ICD-10-CM

## 2022-06-14 DIAGNOSIS — I44.0 FIRST DEGREE AV BLOCK: ICD-10-CM

## 2022-06-14 DIAGNOSIS — E78.5 HYPERLIPEMIA: ICD-10-CM

## 2022-06-14 DIAGNOSIS — I35.9 AORTIC VALVE DISEASE: Primary | ICD-10-CM

## 2022-06-14 DIAGNOSIS — Z95.2 H/O AORTIC VALVE REPLACEMENT: ICD-10-CM

## 2022-06-14 LAB — INR PPP: 2.5 (ref 0.84–1.19)

## 2022-06-14 PROCEDURE — 93000 ELECTROCARDIOGRAM COMPLETE: CPT | Performed by: INTERNAL MEDICINE

## 2022-06-14 PROCEDURE — 99213 OFFICE O/P EST LOW 20 MIN: CPT | Performed by: INTERNAL MEDICINE

## 2022-06-14 PROCEDURE — 93793 ANTICOAG MGMT PT WARFARIN: CPT | Performed by: INTERNAL MEDICINE

## 2022-06-14 RX ORDER — ALPRAZOLAM 0.25 MG/1
0.25 TABLET ORAL SEE ADMIN INSTRUCTIONS
Qty: 45 TABLET | Refills: 3 | Status: SHIPPED | OUTPATIENT
Start: 2022-06-14

## 2022-06-14 RX ORDER — PRAVASTATIN SODIUM 10 MG
10 TABLET ORAL
Qty: 90 TABLET | Refills: 3 | Status: SHIPPED | OUTPATIENT
Start: 2022-06-14

## 2022-06-14 NOTE — PROGRESS NOTES
Cardiology Follow Up    Damaris Dolan  1965  [id number]  [address] [facility] [id number] [phone]    Reason for visit: One year FU for valve disease status post bioprosthetic aortic valve replacement in 1997 with mechanical aortic valve replacement in 2008  Also has hyperlipidemia and history of PVCs with palpitations      1  Aortic valve disease  POCT ECG   2  Hyperlipemia  POCT ECG   3  PVCs (premature ventricular contractions)  POCT ECG   4  Mixed hyperlipidemia  pravastatin (PRAVACHOL) 10 mg tablet       Interval History:   The patient did have COVID in January    She had URI sx    She denies chest pain or SOB  She does get very occasional palpitations    They are brief  She did have one episode of accelerated rate but did not capture this on her [id number] mobile  She denies edema  She denies lightheadedness         Patient Active Problem List   Diagnosis    Aortic valve disease    Palpitations    PVCs (premature ventricular contractions)    Hyperlipemia    Anxiety    H/O aortic valve replacement    Allergic rhinitis    Aortic root enlargement ([address] )    Aortic stenosis    Dense breasts    Motion sickness    Encounter for gynecological examination    Healthcare maintenance    Right flank pain    Other microscopic hematuria    RUQ pain    History of nephrolithiasis    Calculus of gallbladder without cholecystitis without obstruction    Screening for colon cancer    COVID-19 virus infection     Past Medical History:   Diagnosis Date    Anxiety     Aortic valve replaced     Gallstones     History of transfusion     Hyperlipidemia     Kidney stone     Nephrolithiasis     Palpitations     PONV (postoperative nausea and vomiting)     UTI (urinary tract infection) 10/2021     Social History     Socioeconomic History    Marital status: /Civil Union     Spouse name: Not on file    Number of children: Not on file    Years of education: Not on file    Highest education level: Not on file   Occupational History    Not on file   Tobacco Use    Smoking status: Former Smoker     Types: Cigarettes     Quit date:      Years since quittin 4    Smokeless tobacco: Never Used   Vaping Use    Vaping Use: Never used   Substance and Sexual Activity    Alcohol use: Yes     Comment: weekends    Drug use: No    Sexual activity: Not on file   Other Topics Concern    Not on file   Social History Narrative    Not on file     Social Determinants of Health     Financial Resource Strain: Not on file   Food Insecurity: Not on file   Transportation Needs: Not on file   Physical Activity: Not on file   Stress: Not on file   Social Connections: Not on file   Intimate Partner Violence: Not on file   Housing Stability: Not on file      Family History   Problem Relation Age of Onset    Hyperlipidemia Family     COPD Family     Diabetes Family      Past Surgical History:   Procedure Laterality Date    [facility]ORTIC VALVE REPLACEMENT  2008    [facility]    CARDIAC SURGERY      OR LAP,CHOLECYSTECTOMY N/A 11/10/2021    Procedure: CHOLECYSTECTOMY LAPAROSCOPIC W/ ROBOTICS;  Surgeon: Jn Barr MD;  Location: [facility] OR;  Service: General       Current Outpatient Medications:     ALPRAZolam (XANAX) 0 25 mg tablet, Take 1 tablet (0 25 mg total) by mouth see administration instructions One to two tablets daily prn palpitations, Disp: 45 tablet, Rfl: 2    clindamycin (CLEOCIN) 300 MG capsule, Two capsule 1 hour prior to dental work(600mg) as directed by MD , Disp: 2 capsule, Rfl: 3    pravastatin (PRAVACHOL) 10 mg tablet, Take 1 tablet (10 mg total) by mouth daily at bedtime, Disp: 90 tablet, Rfl: 3    warfarin (COUMADIN) 5 mg tablet, Take 1 tablet (5 mg total) by mouth in the morning , Disp: 90 tablet, Rfl: 1  Allergies   Allergen Reactions    Erythromycin Nausea Only and Vomiting    Penicillins Hives    Vicodin  [Hydrocodone-Acetaminophen] Dizziness, Nausea Only and Other (See Comments)       Review of Systems:  Review of Systems   Constitutional: Negative for activity change, appetite change, fatigue and unexpected weight change  Respiratory: Negative for cough, chest tightness, shortness of breath and wheezing  Cardiovascular: Positive for palpitations  Negative for chest pain and leg swelling  Gastrointestinal: Negative for abdominal pain, blood in stool, constipation and diarrhea  Genitourinary: Negative for dysuria, frequency, hematuria and urgency  Musculoskeletal: Negative for arthralgias, back pain, gait problem and joint swelling  Neurological: Negative for dizziness, speech difficulty, light-headedness and headaches  Psychiatric/Behavioral: Negative for agitation, behavioral problems, confusion and decreased concentration  Physical Exam:  Vitals:    06/14/22 0807   BP: 118/60   BP Location: Right arm   Cuff Size: Standard   Pulse: 70   Weight: 78 kg (172 lb)   Height: 5' 4" (1 626 m)       Physical Exam  Constitutional:       General: She is not in acute distress  Appearance: She is obese  HENT:      Head: Normocephalic and atraumatic  Mouth/Throat:      Mouth: Mucous membranes are moist       Pharynx: No oropharyngeal exudate or posterior oropharyngeal erythema  Eyes:      General: No scleral icterus  Conjunctiva/sclera: Conjunctivae normal    Neck:      Thyroid: No thyroid mass or thyromegaly  Vascular: Normal carotid pulses  No carotid bruit or JVD  Cardiovascular:      Rate and Rhythm: Normal rate and regular rhythm  Pulses: Normal pulses  Heart sounds: No murmur heard  No friction rub  No gallop  Comments: Good prosthetic heart sounds  Pulmonary:      Breath sounds: Normal breath sounds  No wheezing, rhonchi or rales  Abdominal:      Palpations: Abdomen is soft  There is no hepatomegaly, splenomegaly or mass  Tenderness: There is no abdominal tenderness  Musculoskeletal:         General: No swelling or deformity  Cervical back: Neck supple  Skin:     Coloration: Skin is not jaundiced or pale  Findings: No bruising, erythema, lesion or rash  Neurological:      Mental Status: She is alert and oriented to person, place, and time  Cranial Nerves: No cranial nerve deficit  Sensory: No sensory deficit  Motor: No weakness  Discussion/Summary:  1  Aortic valve disease status post remote bioprosthetic aortic valve replacement in 1997 with mechanical aortic valve replacement in 2008 at [facility]   Valve sounds fine on exam   She did have an echo in June of 2020 which showed normally functioning prosthesis  Her ejection fraction was read as 45% but upon my inspection I thought it was 55%  Patient taking antibiotic prophylaxis correctly  Warfarin  2  Hyperlipidemia  Patient on pravastatin 10 mg daily  LDL cholesterol 129 with HDL cholesterol 64 and triglycerides 188 when checked a year ago  Will check blood work in the near future  3  PVCs  Patient without ectopy on today's exam or EKG  These are fairly occasional   She does get an acceleration of her heart beat at sometimes which makes me think perhaps she has some kind of paroxysmal tachycardia  Fortunately this is rare  No specific treatment for this  4  Borderline first-degree AV block  Resting heart rate 70 beats per minute  No evidence for profound bradycardia or symptoms to suggest symptomatic bradycardia    Follow-up 1 year with associate  Patient to have colonoscopy in the near future  Should get bridging Lovenox pre and postprocedure      Kelvin Bloom MD      Follow-up regarding blood work in the near future by phone

## 2022-06-14 NOTE — PROGRESS NOTES
Spoke with patient, advised INR good, continue 5 mg Mon Wed Fri, 2 5 mg all other days   Will recheck in 2 weeks 6/27/22    Patient is a home xavier

## 2022-07-07 ENCOUNTER — ANTICOAG VISIT (OUTPATIENT)
Dept: CARDIOLOGY CLINIC | Facility: CLINIC | Age: 57
End: 2022-07-07
Payer: OTHER GOVERNMENT

## 2022-07-07 DIAGNOSIS — Z95.2 H/O AORTIC VALVE REPLACEMENT: ICD-10-CM

## 2022-07-07 DIAGNOSIS — I35.9 AORTIC VALVE DISEASE: Primary | ICD-10-CM

## 2022-07-07 LAB — INR PPP: 2.5 (ref 0.84–1.19)

## 2022-07-07 PROCEDURE — 93793 ANTICOAG MGMT PT WARFARIN: CPT | Performed by: INTERNAL MEDICINE

## 2022-07-07 NOTE — PROGRESS NOTES
Left message for patient, advised INR good, continue 5 mg Mon Wed Fri, 2 5 mg all other days, will recheck in 2 weeks 7/20/22  Advised to call with questions or concerns      Patient is a home xavier

## 2022-07-13 DIAGNOSIS — Z95.2 H/O AORTIC VALVE REPLACEMENT: ICD-10-CM

## 2022-07-13 RX ORDER — CLINDAMYCIN HYDROCHLORIDE 300 MG/1
CAPSULE ORAL
Qty: 2 CAPSULE | Refills: 3 | Status: SHIPPED | OUTPATIENT
Start: 2022-07-13 | End: 2023-07-13

## 2022-08-04 ENCOUNTER — TELEPHONE (OUTPATIENT)
Dept: CARDIOLOGY CLINIC | Facility: CLINIC | Age: 57
End: 2022-08-04

## 2022-08-04 NOTE — TELEPHONE ENCOUNTER
Received notification from [employer], patient over due for INR  Left message for patient to complete ASAP  Advised to call with questions or concerns

## 2022-08-08 ENCOUNTER — ANTICOAG VISIT (OUTPATIENT)
Dept: CARDIOLOGY CLINIC | Facility: CLINIC | Age: 57
End: 2022-08-08

## 2022-08-08 DIAGNOSIS — I35.9 AORTIC VALVE DISEASE: Primary | ICD-10-CM

## 2022-08-08 DIAGNOSIS — Z95.2 H/O AORTIC VALVE REPLACEMENT: ICD-10-CM

## 2022-08-08 LAB — INR PPP: 2.5 (ref 0.84–1.19)

## 2022-09-01 ENCOUNTER — ANTICOAG VISIT (OUTPATIENT)
Dept: CARDIOLOGY CLINIC | Facility: CLINIC | Age: 57
End: 2022-09-01

## 2022-09-01 DIAGNOSIS — Z95.2 H/O AORTIC VALVE REPLACEMENT: ICD-10-CM

## 2022-09-01 DIAGNOSIS — I35.9 AORTIC VALVE DISEASE: Primary | ICD-10-CM

## 2022-09-01 LAB — INR PPP: 3.7 (ref 0.84–1.19)

## 2022-09-01 NOTE — PROGRESS NOTES
Pt called LM hold today then 2 5 tomorrow then baxk to normal dosing with INR on 9/14 pt returned call may have taken extra dose so she held 5 mg last night so will continue normal dosing and recheck in 2 weeks

## 2022-09-21 ENCOUNTER — ANTICOAG VISIT (OUTPATIENT)
Dept: CARDIOLOGY CLINIC | Facility: CLINIC | Age: 57
End: 2022-09-21
Payer: OTHER GOVERNMENT

## 2022-09-21 DIAGNOSIS — I35.9 AORTIC VALVE DISEASE: Primary | ICD-10-CM

## 2022-09-21 DIAGNOSIS — Z95.2 H/O AORTIC VALVE REPLACEMENT: ICD-10-CM

## 2022-09-21 LAB — INR PPP: 3.7 (ref 0.84–1.19)

## 2022-09-21 PROCEDURE — 93793 ANTICOAG MGMT PT WARFARIN: CPT

## 2022-09-21 NOTE — PROGRESS NOTES
Left message, advised INR high, advised to hold tonight and take 5 mg Mon Fri, 2 5 mg all other days, will recheck in 2 weeks 10/5/22  Advised to call if she can think of any reason why her INR is still high        Patient is a home xavier

## 2022-10-14 ENCOUNTER — ANTICOAG VISIT (OUTPATIENT)
Dept: CARDIOLOGY CLINIC | Facility: CLINIC | Age: 57
End: 2022-10-14
Payer: OTHER GOVERNMENT

## 2022-10-14 DIAGNOSIS — I35.9 AORTIC VALVE DISEASE: Primary | ICD-10-CM

## 2022-10-14 DIAGNOSIS — Z95.2 H/O AORTIC VALVE REPLACEMENT: ICD-10-CM

## 2022-10-14 LAB — INR PPP: 2.1 (ref 0.84–1.19)

## 2022-10-14 PROCEDURE — 93793 ANTICOAG MGMT PT WARFARIN: CPT | Performed by: INTERNAL MEDICINE

## 2022-10-14 NOTE — PROGRESS NOTES
Left message for patient, advised INR did come down, now at low end of goal, advised to go back to 5 mg Mon Wed Fri, 2 5 mg all other days  Will recheck in 2 weeks 10/28/22  Advised to call with questions or concerns      Patient is a home xavier

## 2022-11-14 ENCOUNTER — ANTICOAG VISIT (OUTPATIENT)
Dept: CARDIOLOGY CLINIC | Facility: CLINIC | Age: 57
End: 2022-11-14

## 2022-11-14 DIAGNOSIS — I35.9 AORTIC VALVE DISEASE: Primary | ICD-10-CM

## 2022-11-14 DIAGNOSIS — Z95.2 H/O AORTIC VALVE REPLACEMENT: ICD-10-CM

## 2022-11-14 LAB — INR PPP: 2.4 (ref 0.84–1.19)

## 2022-11-14 NOTE — PROGRESS NOTES
Left message for patient, advised INR good, continue 5 mg Mon Wed Fri, 2 5 mg all other days, will recheck in 2 weeks 11/27/22  Advised to call with questions or concerns      Patient is a home xavier

## 2022-11-28 LAB
ALBUMIN SERPL-MCNC: 4.6 G/DL (ref 3.6–5.1)
ALBUMIN/GLOB SERPL: 2 (CALC) (ref 1–2.5)
ALP SERPL-CCNC: 51 U/L (ref 37–153)
ALT SERPL-CCNC: 27 U/L (ref 6–29)
AST SERPL-CCNC: 23 U/L (ref 10–35)
BILIRUB SERPL-MCNC: 1.1 MG/DL (ref 0.2–1.2)
BUN SERPL-MCNC: 9 MG/DL (ref 7–25)
BUN/CREAT SERPL: NORMAL (CALC) (ref 6–22)
CALCIUM SERPL-MCNC: 9.1 MG/DL (ref 8.6–10.4)
CHLORIDE SERPL-SCNC: 104 MMOL/L (ref 98–110)
CHOLEST SERPL-MCNC: 232 MG/DL
CHOLEST/HDLC SERPL: 4.5 (CALC)
CO2 SERPL-SCNC: 27 MMOL/L (ref 20–32)
CREAT SERPL-MCNC: 0.59 MG/DL (ref 0.5–1.03)
ERYTHROCYTE [DISTWIDTH] IN BLOOD BY AUTOMATED COUNT: 12.1 % (ref 11–15)
GFR/BSA.PRED SERPLBLD CYS-BASED-ARV: 104 ML/MIN/1.73M2
GLOBULIN SER CALC-MCNC: 2.3 G/DL (CALC) (ref 1.9–3.7)
GLUCOSE SERPL-MCNC: 92 MG/DL (ref 65–99)
HCT VFR BLD AUTO: 38.7 % (ref 35–45)
HDLC SERPL-MCNC: 51 MG/DL
HGB BLD-MCNC: 12.9 G/DL (ref 11.7–15.5)
LDLC SERPL CALC-MCNC: 127 MG/DL (CALC)
MCH RBC QN AUTO: 31.2 PG (ref 27–33)
MCHC RBC AUTO-ENTMCNC: 33.3 G/DL (ref 32–36)
MCV RBC AUTO: 93.5 FL (ref 80–100)
NONHDLC SERPL-MCNC: 181 MG/DL (CALC)
PLATELET # BLD AUTO: 249 THOUSAND/UL (ref 140–400)
PMV BLD REES-ECKER: 10.4 FL (ref 7.5–12.5)
POTASSIUM SERPL-SCNC: 4.4 MMOL/L (ref 3.5–5.3)
PROT SERPL-MCNC: 6.9 G/DL (ref 6.1–8.1)
RBC # BLD AUTO: 4.14 MILLION/UL (ref 3.8–5.1)
SODIUM SERPL-SCNC: 139 MMOL/L (ref 135–146)
TRIGL SERPL-MCNC: 381 MG/DL
WBC # BLD AUTO: 3.6 THOUSAND/UL (ref 3.8–10.8)

## 2022-12-02 ENCOUNTER — DOCUMENTATION (OUTPATIENT)
Dept: CARDIOLOGY CLINIC | Facility: CLINIC | Age: 57
End: 2022-12-02

## 2022-12-02 ENCOUNTER — TELEPHONE (OUTPATIENT)
Dept: OTHER | Facility: OTHER | Age: 57
End: 2022-12-02

## 2022-12-02 DIAGNOSIS — E78.5 DYSLIPIDEMIA: Primary | ICD-10-CM

## 2022-12-02 LAB — INR PPP: 1.6 (ref 0.84–1.19)

## 2022-12-02 RX ORDER — ROSUVASTATIN CALCIUM 10 MG/1
10 TABLET, COATED ORAL DAILY
Qty: 90 TABLET | Refills: 2 | Status: SHIPPED | OUTPATIENT
Start: 2022-12-02 | End: 2023-05-31

## 2022-12-03 NOTE — PROGRESS NOTES
INR results received, 1 6  Spoke w/ pt who states she may have missed a dose one day and has been eating more salads this week after her TG came back elevated  She normally takes 5 mg twice weekly on Mon/Fri and 2 5 mg on other days  She will take 5 mg tonight and tomorrow, 2 5 mg Sunday and check home INR Monday, then call the warfarin clinic  She expressed concern about her elevated TG and LDL-C and wants to f/u with me in the office  Recommended increased potency statin in light of elevated LDL-C and she is agreeable to start rosuvastatin in place of pravastatin  Will repeat LDL-C in 3 months and have her f/u with me

## 2022-12-03 NOTE — TELEPHONE ENCOUNTER
Lab Result: INR= 1 6   Date/Time Drawn: 12/02/2022 reported at [address] Time   Ordering Provider: Dr Donaldo Ortega Name: Domitila Crum, [facility] [place], [phone]

## 2022-12-05 ENCOUNTER — ANTICOAG VISIT (OUTPATIENT)
Dept: CARDIOLOGY CLINIC | Facility: CLINIC | Age: 57
End: 2022-12-05

## 2022-12-05 DIAGNOSIS — Z95.2 H/O AORTIC VALVE REPLACEMENT: ICD-10-CM

## 2022-12-05 DIAGNOSIS — I35.9 AORTIC VALVE DISEASE: Primary | ICD-10-CM

## 2022-12-05 NOTE — PROGRESS NOTES
Pt called by Dr Simona Montez may have missied a dose and eating more salads  Pt is to recheck today

## 2022-12-06 ENCOUNTER — ANTICOAG VISIT (OUTPATIENT)
Dept: CARDIOLOGY CLINIC | Facility: CLINIC | Age: 57
End: 2022-12-06

## 2022-12-06 DIAGNOSIS — I35.9 AORTIC VALVE DISEASE: Primary | ICD-10-CM

## 2022-12-06 DIAGNOSIS — Z95.2 H/O AORTIC VALVE REPLACEMENT: ICD-10-CM

## 2022-12-06 LAB — INR PPP: 1.8 (ref 0.84–1.19)

## 2022-12-06 NOTE — PROGRESS NOTES
Spoke with patient, states she was only taking 5 mg Mon and Fri, 2 5 mg all other days, advised still low, take 5 mg tonight only instead of 2 5 mg, then 5 mg Mon Wed Fri, 2 5 mg all other days, will recheck next week 12/12/22    Patient is a home xavier

## 2022-12-13 ENCOUNTER — ANTICOAG VISIT (OUTPATIENT)
Dept: CARDIOLOGY CLINIC | Facility: CLINIC | Age: 57
End: 2022-12-13

## 2022-12-13 DIAGNOSIS — Z95.2 H/O AORTIC VALVE REPLACEMENT: ICD-10-CM

## 2022-12-13 DIAGNOSIS — I35.9 AORTIC VALVE DISEASE: Primary | ICD-10-CM

## 2022-12-13 LAB — INR PPP: 2.1 (ref 0.84–1.19)

## 2022-12-13 NOTE — PROGRESS NOTES
Spoke with patient, advised INR good, continue 5 mg Mon Wed Fri, 2 5 mg all other days, will recheck in 2 weeks 12/27/22    Patient is a home xavier

## 2023-01-04 ENCOUNTER — ANTICOAG VISIT (OUTPATIENT)
Dept: CARDIOLOGY CLINIC | Facility: CLINIC | Age: 58
End: 2023-01-04

## 2023-01-04 DIAGNOSIS — I35.9 AORTIC VALVE DISEASE: Primary | ICD-10-CM

## 2023-01-04 DIAGNOSIS — Z95.2 H/O AORTIC VALVE REPLACEMENT: ICD-10-CM

## 2023-01-04 LAB — INR PPP: 3.2 (ref 0.84–1.19)

## 2023-01-04 NOTE — PROGRESS NOTES
Spoke with patient, advised INR slightly elevated, no changes in medications or diet, advised to take 2 5 mg tonight only instead of 5 mg, then go back to 5 mg Mon Wed Fri, 5 mg all other days, will recheck in 2 weeks 1/18/23    Patient is a home xavier

## 2023-01-11 NOTE — TELEPHONE ENCOUNTER
Upon review of the In Basket request we were able to locate, review, and update the patient chart as requested for Pap Smear (HPV) aka Cervical Cancer Screening  Any additional questions or concerns should be emailed to the Practice Liaisons via [email or web address] email, please do not reply via In Basket      Thank you  Abelardo Dunlap MA [Skin Wound] : skin wound [Shortness Of Breath] : no shortness of breath [SOB on Exertion] : no shortness of breath during exertion [Abdominal Pain] : no abdominal pain [Vomiting] : no vomiting [Constipation] : no constipation [Diarrhea] : no diarrhea [Joint Stiffness] : no joint stiffness [Muscle Pain] : no muscle pain [Muscle Weakness] : no muscle weakness [de-identified] : left hand wound

## 2023-01-13 NOTE — PROGRESS NOTES
Left message for patient, advised INR good, not sure if she continues to eat salad on a regular basis, advised to continue 5 mg Mn Wed Fri, 2 5 mg all other days, would like to recheck 4/29/22 to make sure she is not going any higher  Advised to call office with questions or concerns       Patient is a home xavier No

## 2023-01-26 ENCOUNTER — ANTICOAG VISIT (OUTPATIENT)
Dept: CARDIOLOGY CLINIC | Facility: CLINIC | Age: 58
End: 2023-01-26

## 2023-01-26 DIAGNOSIS — Z95.2 H/O AORTIC VALVE REPLACEMENT: ICD-10-CM

## 2023-01-26 DIAGNOSIS — I35.9 AORTIC VALVE DISEASE: Primary | ICD-10-CM

## 2023-01-26 DIAGNOSIS — Z95.2 S/P AVR (AORTIC VALVE REPLACEMENT): ICD-10-CM

## 2023-01-26 LAB — INR PPP: 2.9 (ref 0.84–1.19)

## 2023-01-26 RX ORDER — WARFARIN SODIUM 5 MG/1
5 TABLET ORAL DAILY
Qty: 90 TABLET | Refills: 1 | Status: SHIPPED | OUTPATIENT
Start: 2023-01-26

## 2023-01-26 NOTE — PROGRESS NOTES
Spoke with patient, advised INR good, continue 2 5 mg Mon Wed Fri, 5 mg all other days, will recheck in 2 weeks 2/8/23    Patient is a home xavier

## 2023-02-15 ENCOUNTER — ANTICOAG VISIT (OUTPATIENT)
Dept: CARDIOLOGY CLINIC | Facility: CLINIC | Age: 58
End: 2023-02-15

## 2023-02-15 DIAGNOSIS — Z95.2 H/O AORTIC VALVE REPLACEMENT: ICD-10-CM

## 2023-02-15 DIAGNOSIS — I35.9 AORTIC VALVE DISEASE: Primary | ICD-10-CM

## 2023-02-15 LAB — INR PPP: 2.5 (ref 0.84–1.19)

## 2023-02-15 NOTE — PROGRESS NOTES
Left message for patient, advised INR good, continue 5 mg Mon Wed Fri, 2 5 mg all other days, will recheck in 2 weeks 2/28/23  Advised to call with questions or concerns    Patient is a home xavier

## 2023-03-10 ENCOUNTER — ANTICOAG VISIT (OUTPATIENT)
Dept: CARDIOLOGY CLINIC | Facility: CLINIC | Age: 58
End: 2023-03-10

## 2023-03-10 DIAGNOSIS — Z95.2 H/O AORTIC VALVE REPLACEMENT: ICD-10-CM

## 2023-03-10 DIAGNOSIS — I35.9 AORTIC VALVE DISEASE: Primary | ICD-10-CM

## 2023-03-10 LAB — INR PPP: 4.1 (ref 0.84–1.19)

## 2023-03-10 NOTE — PROGRESS NOTES
Spoke with patient, INR high, states she started taking Tumeric 2 caps every other day  Advised that will affect INR, she wants to continue to take it     Advised to hold tonight, then take 5 mg Mon Fri, 2 5 mg all other days, will recheck 3/21/23    Patient is a home xavier

## 2023-03-28 LAB
CHOLEST SERPL-MCNC: 161 MG/DL
CHOLEST/HDLC SERPL: 2.3 (CALC)
HDLC SERPL-MCNC: 69 MG/DL
LDLC SERPL CALC-MCNC: 66 MG/DL (CALC)
NONHDLC SERPL-MCNC: 92 MG/DL (CALC)
TRIGL SERPL-MCNC: 184 MG/DL

## 2023-03-29 ENCOUNTER — OFFICE VISIT (OUTPATIENT)
Dept: CARDIOLOGY CLINIC | Facility: CLINIC | Age: 58
End: 2023-03-29

## 2023-03-29 VITALS
HEART RATE: 75 BPM | WEIGHT: 164 LBS | DIASTOLIC BLOOD PRESSURE: 72 MMHG | OXYGEN SATURATION: 98 % | BODY MASS INDEX: 28 KG/M2 | SYSTOLIC BLOOD PRESSURE: 138 MMHG | HEIGHT: 64 IN

## 2023-03-29 DIAGNOSIS — I35.9 AORTIC VALVE DISEASE: Primary | ICD-10-CM

## 2023-03-29 DIAGNOSIS — I10 BENIGN ESSENTIAL HTN: ICD-10-CM

## 2023-03-29 DIAGNOSIS — E78.5 DYSLIPIDEMIA: ICD-10-CM

## 2023-03-29 NOTE — PROGRESS NOTES
Cardiology             Glory Yazmin  1965  [id number]              Assessment/Plan:    Remote bioprosthetic aortic valve replacement in 1997 with subsequent mechanical aortic valve replacement 2008 at [facility]  Dyslipidemia  Chronic palpitations, possibly due to PVCs      No significant murmur by examination, continue surveillance  Prior echocardiogram 6/2020 with normally functioning mechanical valve  She is aware to use antibiotics for endocarditis prophylaxis before any dental work  She will need Lovenox bridging for any interruption of warfarin  Goal INR is 2-3 in the setting of a mechanical aortic valve without other risk factors  Significant improved LDL cholesterol and triglycerides after pravastatin changed to rosuvastatin for better repeat lipid control  She does have some joint pain in her elbows and knees which I am not sure is from the statin itself  I have offered to change her rosuvastatin to atorvastatin, although at this time she wants to just continue watching this  Patient with chronic palpitations which she states she gets on a daily basis  This has been thought to be due to PVCs in the past   I have offered her another cardiac monitor but she is currently refusing at this time, stating that she will consider this if her symptoms become any worse  Follow-up in 1 year      Interval History: This is a 57-year-old female with remote bioprosthetic aortic valve replacement 1997 with subsequent mechanical aortic valve replacement 2008 at [facility]   She also has dyslipidemia and history of palpitations due to PVCs  For this she has been following with Dr Thony Garza in the past     In December 2022, she was changed from pravastatin to rosuvastatin for better lipid control  She presents today for follow-up  Lipid panel yesterday revealed her LDL at 66 milligrams per deciliter, improving from 127 mg/dL from November 2022    Triglycerides also improved from 381 to "184 mg/dL  From a symptomatic standpoint she feels well, denying chest pain, shortness of breath, dizziness, lower extremity edema  She admits to palpitations which she states have been chronic and stable  She also admits to some joint discomfort in her knees and elbows usually in the morning hours which improves as she starts moving                Vitals:  Vitals:    23 1115   BP: 138/72   Pulse: 75   SpO2: 98%   Weight: 74 4 kg (164 lb)   Height: 5' 4\" (1 626 m)         Past Medical History:   Diagnosis Date   • Anxiety    • Aortic valve replaced    • Gallstones    • History of transfusion    • Hyperlipidemia    • Kidney stone    • Nephrolithiasis    • Palpitations    • PONV (postoperative nausea and vomiting)    • UTI (urinary tract infection) 10/2021     Social History     Socioeconomic History   • Marital status: /Civil Union     Spouse name: Not on file   • Number of children: Not on file   • Years of education: Not on file   • Highest education level: Not on file   Occupational History   • Not on file   Tobacco Use   • Smoking status: Former     Types: Cigarettes     Quit date:      Years since quittin 2   • Smokeless tobacco: Never   Vaping Use   • Vaping Use: Never used   Substance and Sexual Activity   • Alcohol use: Yes     Comment: weekends   • Drug use: No   • Sexual activity: Not on file   Other Topics Concern   • Not on file   Social History Narrative   • Not on file     Social Determinants of Health     Financial Resource Strain: Not on file   Food Insecurity: Not on file   Transportation Needs: Not on file   Physical Activity: Not on file   Stress: Not on file   Social Connections: Not on file   Intimate Partner Violence: Not on file   Housing Stability: Not on file      Family History   Problem Relation Age of Onset   • Hyperlipidemia Family    • COPD Family    • Diabetes Family      Past Surgical History:   Procedure Laterality Date   • AORTIC VALVE REPLACEMENT  2008    " [facility]   • CARDIAC SURGERY     • MA LAP,CHOLECYSTECTOMY N/A 11/10/2021    Procedure: CHOLECYSTECTOMY LAPAROSCOPIC W/ ROBOTICS;  Surgeon: Neema Talbot MD;  Location: [facility];  Service: General       Current Outpatient Medications:   •  ALPRAZolam (XANAX) 0 25 mg tablet, Take 1 tablet (0 25 mg total) by mouth see administration instructions One to two tablets daily prn palpitations, Disp: 45 tablet, Rfl: 3  •  clindamycin (CLEOCIN) 300 MG capsule, Two capsule 1 hour prior to dental work(600mg) as directed by MD , Disp: 2 capsule, Rfl: 3  •  rosuvastatin (CRESTOR) 10 MG tablet, Take 1 tablet (10 mg total) by mouth daily, Disp: 90 tablet, Rfl: 2  •  warfarin (COUMADIN) 5 mg tablet, Take 1 tablet (5 mg total) by mouth daily, Disp: 90 tablet, Rfl: 1        Review of Systems:  Review of Systems   Constitutional: Negative for activity change, fever and unexpected weight change  HENT: Negative for facial swelling, nosebleeds and voice change  Respiratory: Negative for chest tightness, shortness of breath and wheezing  Cardiovascular: Negative for chest pain, palpitations and leg swelling  Gastrointestinal: Negative for abdominal distention  Genitourinary: Negative for hematuria  Musculoskeletal: Negative for arthralgias  Skin: Negative for color change, pallor, rash and wound  Neurological: Negative for dizziness, seizures and syncope  Psychiatric/Behavioral: Negative for agitation  Physical Exam:  Physical Exam  Vitals reviewed  Constitutional:       Appearance: She is well-developed  HENT:      Head: Normocephalic and atraumatic  Cardiovascular:      Rate and Rhythm: Normal rate and regular rhythm  Heart sounds: Normal heart sounds  Pulmonary:      Effort: Pulmonary effort is normal       Breath sounds: Normal breath sounds  Abdominal:      Palpations: Abdomen is soft  Musculoskeletal:         General: Normal range of motion        Cervical back: Normal range of motion and neck supple  Skin:     General: Skin is warm and dry  Neurological:      Mental Status: She is alert and oriented to person, place, and time  Psychiatric:         Behavior: Behavior normal          Thought Content: Thought content normal          Judgment: Judgment normal          This note was completed in part utilizing M-Modal Fluency Direct Software  Grammatical errors, random word insertions, spelling mistakes, and incomplete sentences can be an occasional consequence of this system secondary to software limitations, ambient noise, and hardware issues  If you have any questions or concerns about the content, text, or information contained within the body of this dictation, please contact the provider for clarification

## 2023-04-03 ENCOUNTER — ANTICOAG VISIT (OUTPATIENT)
Dept: CARDIOLOGY CLINIC | Facility: CLINIC | Age: 58
End: 2023-04-03

## 2023-04-03 DIAGNOSIS — Z95.2 H/O AORTIC VALVE REPLACEMENT: ICD-10-CM

## 2023-04-03 DIAGNOSIS — I35.9 AORTIC VALVE DISEASE: Primary | ICD-10-CM

## 2023-04-03 LAB — INR PPP: 2.6 (ref 0.84–1.19)

## 2023-04-03 NOTE — PROGRESS NOTES
Left message for patient, advised INR good, continue 5 mg Mon Fri, 2 5 mg all other days, will recheck in 3 weeks 4/22/23  Advised to call with questions or concerns      Patient is a home xavier

## 2023-04-27 ENCOUNTER — ANTICOAG VISIT (OUTPATIENT)
Dept: CARDIOLOGY CLINIC | Facility: CLINIC | Age: 58
End: 2023-04-27

## 2023-04-27 DIAGNOSIS — I35.9 AORTIC VALVE DISEASE: Primary | ICD-10-CM

## 2023-04-27 DIAGNOSIS — Z95.2 H/O AORTIC VALVE REPLACEMENT: ICD-10-CM

## 2023-04-27 LAB — INR PPP: 2 (ref 0.84–1.19)

## 2023-04-27 NOTE — PROGRESS NOTES
Left message for patient, advised INR at bottom of goal, asked if she is continuing to take tumeric capsules, as we had decreased dose to 5 mg Mon Fri, 2 5 mg all other days, or if she missed any doses    Advised will increase back to 5 mg Mon Wed Fri, 2 5 mg all other days, will recheck in 2 weeks 5/10/23    Advised to call with questions or concerns    Patient is a home xavier

## 2023-05-30 ENCOUNTER — TELEPHONE (OUTPATIENT)
Dept: CARDIOLOGY CLINIC | Facility: CLINIC | Age: 58
End: 2023-05-30

## 2023-05-30 NOTE — TELEPHONE ENCOUNTER
Left message, advised INR is overdue, please complete ASAP  Advised to call with questions or concerns

## 2023-05-31 ENCOUNTER — ANTICOAG VISIT (OUTPATIENT)
Dept: CARDIOLOGY CLINIC | Facility: CLINIC | Age: 58
End: 2023-05-31

## 2023-05-31 DIAGNOSIS — Z95.2 S/P AVR (AORTIC VALVE REPLACEMENT): ICD-10-CM

## 2023-05-31 DIAGNOSIS — Z95.2 H/O AORTIC VALVE REPLACEMENT: ICD-10-CM

## 2023-05-31 DIAGNOSIS — I35.9 AORTIC VALVE DISEASE: Primary | ICD-10-CM

## 2023-05-31 LAB — INR PPP: 2.9 (ref 0.84–1.19)

## 2023-05-31 RX ORDER — WARFARIN SODIUM 5 MG/1
5 TABLET ORAL DAILY
Qty: 90 TABLET | Refills: 1 | Status: SHIPPED | OUTPATIENT
Start: 2023-05-31

## 2023-05-31 NOTE — PROGRESS NOTES
Left message for patient, advised INR at top of goal, advised to take 2 5 mg tonight only instead of 5 mg then go back to 5 mg Mon Wed Fri, 2 5 mg all other days, will recheck in 2 weeks 6/13/23  Advised to call with questions or concerns    Patient is a home xavier

## 2023-06-29 ENCOUNTER — TELEPHONE (OUTPATIENT)
Dept: CARDIOLOGY CLINIC | Facility: CLINIC | Age: 58
End: 2023-06-29

## 2023-07-03 ENCOUNTER — ANTICOAG VISIT (OUTPATIENT)
Dept: CARDIOLOGY CLINIC | Facility: CLINIC | Age: 58
End: 2023-07-03

## 2023-07-03 DIAGNOSIS — I35.9 AORTIC VALVE DISEASE: Primary | ICD-10-CM

## 2023-07-03 DIAGNOSIS — Z95.2 H/O AORTIC VALVE REPLACEMENT: ICD-10-CM

## 2023-07-03 LAB — INR PPP: 3.9 (ref 0.84–1.19)

## 2023-08-04 ENCOUNTER — ANTICOAG VISIT (OUTPATIENT)
Dept: CARDIOLOGY CLINIC | Facility: CLINIC | Age: 58
End: 2023-08-04

## 2023-08-04 DIAGNOSIS — Z95.2 H/O AORTIC VALVE REPLACEMENT: ICD-10-CM

## 2023-08-04 DIAGNOSIS — I35.9 AORTIC VALVE DISEASE: Primary | ICD-10-CM

## 2023-08-04 LAB — INR PPP: 3.5 (ref 0.84–1.19)

## 2023-09-08 ENCOUNTER — ANTICOAG VISIT (OUTPATIENT)
Dept: CARDIOLOGY CLINIC | Facility: CLINIC | Age: 58
End: 2023-09-08
Payer: OTHER GOVERNMENT

## 2023-09-08 DIAGNOSIS — I35.9 AORTIC VALVE DISEASE: Primary | ICD-10-CM

## 2023-09-08 DIAGNOSIS — Z95.2 H/O AORTIC VALVE REPLACEMENT: ICD-10-CM

## 2023-09-08 LAB — INR PPP: 1.7 (ref 0.84–1.19)

## 2023-09-08 PROCEDURE — 93793 ANTICOAG MGMT PT WARFARIN: CPT

## 2023-09-08 NOTE — PROGRESS NOTES
Patient called, states INR was 1.7, states she missed a dose Mon or Tues, current dose verified, advised to take 7.5 mg tonight, then go back to 5 mg Mon Fri, 2.5 mg all other days, will recheck in 2 weeks 9/22/23    Patient is a home xavier

## 2023-09-15 DIAGNOSIS — E78.5 DYSLIPIDEMIA: ICD-10-CM

## 2023-09-15 RX ORDER — ROSUVASTATIN CALCIUM 10 MG/1
10 TABLET, COATED ORAL DAILY
Qty: 90 TABLET | Refills: 2 | Status: SHIPPED | OUTPATIENT
Start: 2023-09-15

## 2023-09-22 ENCOUNTER — TELEPHONE (OUTPATIENT)
Dept: ADMINISTRATIVE | Facility: OTHER | Age: 58
End: 2023-09-22

## 2023-09-22 NOTE — TELEPHONE ENCOUNTER
----- Message from Melia sent at 9/22/2023 11:04 AM EDT -----  Regarding: care gap request  09/22/23 11:04 AM    Hello, our patient attached above has had Mammogram completed/performed. Please assist in updating the patient chart by pulling the Care Everywhere (CE) document. The date of service is 9/18/2023.      Thank you,  Adenike Cordero [facility] PRIMARY [facility]ARE

## 2023-09-22 NOTE — TELEPHONE ENCOUNTER
Upon review of the In Basket request we were able to locate, review, and update the patient chart as requested for Mammogram.    Any additional questions or concerns should be emailed to the Practice Liaisons via the appropriate education email address, please do not reply via In Basket.     Thank you  Becky Deras

## 2023-10-03 ENCOUNTER — TELEPHONE (OUTPATIENT)
Dept: CARDIOLOGY CLINIC | Facility: CLINIC | Age: 58
End: 2023-10-03

## 2023-10-04 ENCOUNTER — ANTICOAG VISIT (OUTPATIENT)
Dept: CARDIOLOGY CLINIC | Facility: CLINIC | Age: 58
End: 2023-10-04
Payer: OTHER GOVERNMENT

## 2023-10-04 DIAGNOSIS — Z95.2 H/O AORTIC VALVE REPLACEMENT: ICD-10-CM

## 2023-10-04 DIAGNOSIS — I35.9 AORTIC VALVE DISEASE: Primary | ICD-10-CM

## 2023-10-04 LAB — INR PPP: 3.7 (ref 0.84–1.19)

## 2023-10-04 PROCEDURE — 93793 ANTICOAG MGMT PT WARFARIN: CPT | Performed by: INTERNAL MEDICINE

## 2023-10-04 NOTE — PROGRESS NOTES
Left message for patient, advised INR high, advised to hold tonight, then go back to 5 mg Mon Fri, 2.5 mg all other days, will recheck in 2 weeks 10/17/23    Advised to call with questions or concerns, or changes in medications or health.      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

## 2023-10-06 ENCOUNTER — DOCUMENTATION (OUTPATIENT)
Dept: CARDIOLOGY CLINIC | Facility: CLINIC | Age: 58
End: 2023-10-06

## 2023-11-02 ENCOUNTER — TELEPHONE (OUTPATIENT)
Dept: CARDIOLOGY CLINIC | Facility: CLINIC | Age: 58
End: 2023-11-02

## 2023-11-02 NOTE — TELEPHONE ENCOUNTER
Left message, advised INR is overdue, please complete ASAP  Advised to call with questions or concerns.

## 2023-11-30 LAB — INR PPP: 2.7 (ref 0.84–1.19)

## 2023-12-01 ENCOUNTER — ANTICOAG VISIT (OUTPATIENT)
Dept: CARDIOLOGY CLINIC | Facility: CLINIC | Age: 58
End: 2023-12-01

## 2023-12-01 DIAGNOSIS — Z95.2 H/O AORTIC VALVE REPLACEMENT: ICD-10-CM

## 2023-12-01 DIAGNOSIS — I35.9 AORTIC VALVE DISEASE: Primary | ICD-10-CM

## 2023-12-01 NOTE — PROGRESS NOTES
PC to patient with good INR of 2.7. Patient advised to continue same dosing and retest in 2 weeks 12/15/23. Takes 5 mgs M/F and 2.5 mgs all other days.

## 2023-12-27 ENCOUNTER — TELEPHONE (OUTPATIENT)
Dept: CARDIOLOGY CLINIC | Facility: CLINIC | Age: 58
End: 2023-12-27

## 2023-12-27 ENCOUNTER — ANTICOAG VISIT (OUTPATIENT)
Dept: CARDIOLOGY CLINIC | Facility: CLINIC | Age: 58
End: 2023-12-27
Payer: OTHER GOVERNMENT

## 2023-12-27 DIAGNOSIS — I35.9 AORTIC VALVE DISEASE: Primary | ICD-10-CM

## 2023-12-27 DIAGNOSIS — Z95.2 H/O AORTIC VALVE REPLACEMENT: ICD-10-CM

## 2023-12-27 LAB — INR PPP: 6.4 (ref 0.84–1.19)

## 2023-12-27 PROCEDURE — 93793 ANTICOAG MGMT PT WARFARIN: CPT | Performed by: INTERNAL MEDICINE

## 2023-12-27 NOTE — PROGRESS NOTES
Patient called with INR 6.4, states she did start magnesium this week, has been drinking a little more as she is on vacation. Advised both of those can increase INR. States she also received Jantoven instead of Coumadin, the pharmacist advised that should not make a difference.     Advised to hold tonight and tomorrow, will recheck Fri.   Patient will eat greens/salad tonight. Advised if she falls/hits head or cuts self to be evaluated in ER    Patient is a home xavier

## 2023-12-29 ENCOUNTER — ANTICOAG VISIT (OUTPATIENT)
Dept: CARDIOLOGY CLINIC | Facility: CLINIC | Age: 58
End: 2023-12-29
Payer: OTHER GOVERNMENT

## 2023-12-29 DIAGNOSIS — Z95.2 H/O AORTIC VALVE REPLACEMENT: ICD-10-CM

## 2023-12-29 DIAGNOSIS — I35.9 AORTIC VALVE DISEASE: Primary | ICD-10-CM

## 2023-12-29 LAB — INR PPP: 3.5 (ref 0.84–1.19)

## 2023-12-29 PROCEDURE — 93793 ANTICOAG MGMT PT WARFARIN: CPT | Performed by: INTERNAL MEDICINE

## 2023-12-29 NOTE — PROGRESS NOTES
Patient called with INR 3.5  today, advised to hold again tonight, then go back to 2.5 mg Sat Sun, 5 mg Mon, will recheck INR 1/2/24  Patient did stop Mg supplement.    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

## 2024-01-03 ENCOUNTER — ANTICOAG VISIT (OUTPATIENT)
Dept: CARDIOLOGY CLINIC | Facility: CLINIC | Age: 59
End: 2024-01-03
Payer: OTHER GOVERNMENT

## 2024-01-03 DIAGNOSIS — Z95.2 H/O AORTIC VALVE REPLACEMENT: ICD-10-CM

## 2024-01-03 DIAGNOSIS — I35.9 AORTIC VALVE DISEASE: Primary | ICD-10-CM

## 2024-01-03 LAB — INR PPP: 1.7 (ref 0.84–1.19)

## 2024-01-03 PROCEDURE — 93793 ANTICOAG MGMT PT WARFARIN: CPT | Performed by: INTERNAL MEDICINE

## 2024-01-03 NOTE — PROGRESS NOTES
Left message for patient, advised INR is a little low, advised to continue 5 mg Mon Fri, 2.5 mg all other days, will recheck next week, advised to call with questions or concerns.     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

## 2024-01-17 ENCOUNTER — OFFICE VISIT (OUTPATIENT)
Dept: FAMILY MEDICINE CLINIC | Facility: CLINIC | Age: 59
End: 2024-01-17
Payer: OTHER GOVERNMENT

## 2024-01-17 VITALS
BODY MASS INDEX: 29.88 KG/M2 | DIASTOLIC BLOOD PRESSURE: 80 MMHG | OXYGEN SATURATION: 99 % | SYSTOLIC BLOOD PRESSURE: 118 MMHG | HEART RATE: 76 BPM | WEIGHT: 175 LBS | HEIGHT: 64 IN

## 2024-01-17 DIAGNOSIS — I49.3 PVCS (PREMATURE VENTRICULAR CONTRACTIONS): ICD-10-CM

## 2024-01-17 DIAGNOSIS — N39.0 URINARY TRACT INFECTION WITHOUT HEMATURIA, SITE UNSPECIFIED: Primary | ICD-10-CM

## 2024-01-17 DIAGNOSIS — F41.9 ANXIETY: ICD-10-CM

## 2024-01-17 LAB
SL AMB  POCT GLUCOSE, UA: NORMAL
SL AMB LEUKOCYTE ESTERASE,UA: NORMAL
SL AMB POCT BILIRUBIN,UA: NORMAL
SL AMB POCT BLOOD,UA: NORMAL
SL AMB POCT CLARITY,UA: NORMAL
SL AMB POCT COLOR,UA: YELLOW
SL AMB POCT KETONES,UA: NORMAL
SL AMB POCT NITRITE,UA: NORMAL
SL AMB POCT PH,UA: 6
SL AMB POCT SPECIFIC GRAVITY,UA: 1.02
SL AMB POCT URINE PROTEIN: NORMAL
SL AMB POCT UROBILINOGEN: 0.2

## 2024-01-17 PROCEDURE — 99214 OFFICE O/P EST MOD 30 MIN: CPT | Performed by: NURSE PRACTITIONER

## 2024-01-17 PROCEDURE — 81002 URINALYSIS NONAUTO W/O SCOPE: CPT | Performed by: NURSE PRACTITIONER

## 2024-01-17 PROCEDURE — 87086 URINE CULTURE/COLONY COUNT: CPT | Performed by: NURSE PRACTITIONER

## 2024-01-17 RX ORDER — NITROFURANTOIN 25; 75 MG/1; MG/1
100 CAPSULE ORAL 2 TIMES DAILY
Qty: 14 CAPSULE | Refills: 0 | Status: SHIPPED | OUTPATIENT
Start: 2024-01-17 | End: 2024-01-24

## 2024-01-17 RX ORDER — PROPRANOLOL HYDROCHLORIDE 10 MG/1
10 TABLET ORAL 3 TIMES DAILY PRN
Qty: 90 TABLET | Refills: 1 | Status: SHIPPED | OUTPATIENT
Start: 2024-01-17

## 2024-01-17 NOTE — ASSESSMENT & PLAN NOTE
We discussed the different treatment options, SSRI zoloft, lexapro, versus propranolol or buspar. She wants to try propranolol . We discussed she can take this daily or as needed as directed.   We also discussed that she may benefit from therapy as well.   Recheck with patient in 4-6 weeks.

## 2024-01-17 NOTE — PATIENT INSTRUCTIONS
Urinary Tract Infection in Women   AMBULATORY CARE:   A urinary tract infection (UTI)  is caused by bacteria that get inside your urinary tract. Your urinary tract includes your kidneys, ureters, bladder, and urethra. A UTI is more common in your lower urinary tract, which includes your bladder and urethra.       Common symptoms include the following:   Urinating more often or waking from sleep to urinate    Pain or burning when you urinate    Pain or pressure in your lower abdomen and back    Urine that smells bad    Blood in your urine    Leaking urine    Seek care immediately if:   You are urinating very little or not at all.    You have a high fever with shaking chills.    You have side or back pain that gets worse.    Call your doctor if:   You have a fever.    You do not feel better after 2 days of taking antibiotics.    You have new symptoms, such as blood or pus in your urine.    You are vomiting.    You have questions or concerns about your condition or care.    Treatment for a UTI  may include antibiotics to treat a bacterial infection. You may also need medicines to decrease pain and burning, or decrease the urge to urinate often. If you have UTIs often (called recurrent UTIs), you may be given antibiotics to take regularly. You will be given directions for when and how to use antibiotics. The goal is to prevent UTIs but not cause antibiotic resistance by using antibiotics too often.  Prevent a UTI:   Empty your bladder often.  Urinate and empty your bladder as soon as you feel the need. Do not hold your urine for long periods of time.    Wipe from front to back after you urinate or have a bowel movement.  This will help prevent germs from getting into your urinary tract through your urethra.    Drink liquids as directed.  Ask how much liquid to drink each day and which liquids are best for you. You may need to drink more liquids than usual to help flush out the bacteria. Do not drink alcohol, caffeine,  or citrus juices. These can irritate your bladder and increase your symptoms. Your healthcare provider may recommend cranberry juice to help prevent a UTI.    Urinate before and after you have sex.  This can help flush out bacteria passed during sex.    Do not douche or use feminine deodorants.  These can change the chemical balance in your vagina.    Change sanitary pads or tampons often.  This will help prevent germs from getting into your urinary tract.    Talk to your healthcare provider about your birth control method.  You may need to change your method if it is increasing your risk for UTIs.    Wear cotton underwear and clothes that are loose.  Tight pants and nylon underwear can trap moisture and cause bacteria to grow.    Vaginal estrogen may be recommended.  This medicine helps prevent UTIs in women who have gone through menopause or are in roseanne-menopause.    Do pelvic muscle exercises often.  Pelvic muscle exercises may help you start and stop urinating. Strong pelvic muscles may help you empty your bladder easier. Squeeze these muscles tightly for 5 seconds like you are trying to hold back urine. Then relax for 5 seconds. Gradually work up to squeezing for 10 seconds. Do 3 sets of 15 repetitions a day, or as directed.    Follow up with your doctor as directed:  Write down your questions so you remember to ask them during your visits.  © Copyright [employer] 2023 Information is for End User's use only and may not be sold, redistributed or otherwise used for commercial purposes.  The above information is an  only. It is not intended as medical advice for individual conditions or treatments. Talk to your doctor, nurse or pharmacist before following any medical regimen to see if it is safe and effective for you.

## 2024-01-17 NOTE — PROGRESS NOTES
Name: Maddie Rockwell      : 1965      MRN: [id number]  Encounter Provider: MOOSE Serrato  Encounter Date: 2024   Encounter department: [facility]    Assessment & Plan     1. Urinary tract infection without hematuria, site unspecified  Comments:  patient UA positive and she symptomatic. continue ioncreas water. start macrobid. urine sent for culture.  Orders:  -     POCT urine dip  -     Urine culture; Future  -     Urine culture  -     nitrofurantoin (MACROBID) 100 mg capsule; Take 1 capsule (100 mg total) by mouth 2 (two) times a day for 7 days    2. PVCs (premature ventricular contractions)  Assessment & Plan:  Patient has xanax prescribed by cardiology.     Orders:  -     propranolol (INDERAL) 10 mg tablet; Take 1 tablet (10 mg total) by mouth 3 (three) times a day as needed (anxiety, palpitations, panic)    3. Anxiety  Assessment & Plan:  We discussed the different treatment options, SSRI zoloft, lexapro, versus propranolol or buspar. She wants to try propranolol . We discussed she can take this daily or as needed as directed.   We also discussed that she may benefit from therapy as well.   Recheck with patient in 4-6 weeks.     Orders:  -     propranolol (INDERAL) 10 mg tablet; Take 1 tablet (10 mg total) by mouth 3 (three) times a day as needed (anxiety, palpitations, panic)           Subjective      Patient presents today for 3 weeks on off urinary symptoms started increasing water intake and drinking cranberry juice but he symptoms are not getting any better   She also wants to discuss her anxiety- the last year has been worse. Her anxiety episodes occur in episodes. She will get lightheaded and dizzy. She soriano shave xanax that she does take for palpitations. She often feels like she in panic and feels trapped and need to get out. She then has anxiety about the panic attacks. Stress is a trigger, traveling trigger.       Review of Systems   Constitutional:  Negative  "for chills, diaphoresis and fever.   Gastrointestinal:  Negative for nausea.   Genitourinary:  Positive for dysuria, frequency and pelvic pain. Negative for difficulty urinating, hematuria, urgency and vaginal discharge.   Musculoskeletal:  Positive for back pain.   Psychiatric/Behavioral:  The patient is nervous/anxious.        Current Outpatient Medications on File Prior to Visit   Medication Sig   • ALPRAZolam (XANAX) 0.25 mg tablet Take 1 tablet (0.25 mg total) by mouth see administration instructions One to two tablets daily prn palpitations   • rosuvastatin (CRESTOR) 10 MG tablet TAKE ONE TABLET BY MOUTH EVERY DAY   • warfarin (COUMADIN) 5 mg tablet Take 1 tablet (5 mg total) by mouth daily       Objective     /80 (BP Location: Left arm, Patient Position: Sitting, Cuff Size: Adult)   Pulse 76   Ht 5' 4\" (1.626 m)   Wt 79.4 kg (175 lb)   SpO2 99%   BMI 30.04 kg/m²     Physical Exam  Vitals and nursing note reviewed.   Constitutional:       Appearance: Normal appearance. She is well-developed.   HENT:      Head: Normocephalic and atraumatic.   Eyes:      Extraocular Movements: Extraocular movements intact.      Conjunctiva/sclera: Conjunctivae normal.      Pupils: Pupils are equal, round, and reactive to light.   Cardiovascular:      Rate and Rhythm: Normal rate and regular rhythm.      Heart sounds: Normal heart sounds, S1 normal and S2 normal.      Comments: Aortic valve click   Pulmonary:      Effort: Pulmonary effort is normal.      Breath sounds: Normal breath sounds.   Abdominal:      General: Abdomen is flat.      Palpations: Abdomen is soft.      Tenderness: There is no abdominal tenderness. There is no right CVA tenderness or left CVA tenderness.   Musculoskeletal:      Right lower leg: No edema.      Left lower leg: No edema.   Neurological:      Mental Status: She is alert and oriented to person, place, and time.   Psychiatric:         Mood and Affect: Mood is anxious.         Behavior: " Behavior normal.         Thought Content: Thought content normal.         Judgment: Judgment normal.         MOOSE Serrato

## 2024-01-18 ENCOUNTER — ANTICOAG VISIT (OUTPATIENT)
Dept: CARDIOLOGY CLINIC | Facility: CLINIC | Age: 59
End: 2024-01-18

## 2024-01-18 DIAGNOSIS — Z95.2 H/O AORTIC VALVE REPLACEMENT: ICD-10-CM

## 2024-01-18 DIAGNOSIS — I35.9 AORTIC VALVE DISEASE: Primary | ICD-10-CM

## 2024-01-18 LAB
BACTERIA UR CULT: NORMAL
INR PPP: 2.7 (ref 0.84–1.19)

## 2024-01-18 NOTE — PROGRESS NOTES
Patient taking Macrobid for 7 days.  Having patient take same dosing but recheck in one week. 1/26/24.  Patient takes 5 mgs M/F and 2.5 mgs all other days of the week.

## 2024-02-02 ENCOUNTER — ANTICOAG VISIT (OUTPATIENT)
Dept: CARDIOLOGY CLINIC | Facility: CLINIC | Age: 59
End: 2024-02-02
Payer: OTHER GOVERNMENT

## 2024-02-02 DIAGNOSIS — I35.9 AORTIC VALVE DISEASE: Primary | ICD-10-CM

## 2024-02-02 DIAGNOSIS — Z95.2 H/O AORTIC VALVE REPLACEMENT: ICD-10-CM

## 2024-02-02 LAB — INR PPP: 2 (ref 0.84–1.19)

## 2024-02-02 PROCEDURE — 93793 ANTICOAG MGMT PT WARFARIN: CPT | Performed by: INTERNAL MEDICINE

## 2024-02-02 NOTE — PROGRESS NOTES
Left message for patient, advised INR at bottom of goal. Advised to take 5 mg tomorrow instead of 2.5 mg, then go back to 5 mg Mon Fri, 2.5 mg all other days, will recheck in 2 weeks 2/16/24  Advised to call with questions or concerns.    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

## 2024-02-21 PROBLEM — Z01.419 ENCOUNTER FOR GYNECOLOGICAL EXAMINATION: Status: RESOLVED | Noted: 2020-02-25 | Resolved: 2024-02-21

## 2024-02-21 PROBLEM — Z00.00 HEALTHCARE MAINTENANCE: Status: RESOLVED | Noted: 2020-02-26 | Resolved: 2024-02-21

## 2024-02-21 PROBLEM — Z12.11 SCREENING FOR COLON CANCER: Status: RESOLVED | Noted: 2020-06-02 | Resolved: 2024-02-21

## 2024-02-28 ENCOUNTER — ANTICOAG VISIT (OUTPATIENT)
Dept: CARDIOLOGY CLINIC | Facility: CLINIC | Age: 59
End: 2024-02-28
Payer: OTHER GOVERNMENT

## 2024-02-28 DIAGNOSIS — Z95.2 H/O AORTIC VALVE REPLACEMENT: ICD-10-CM

## 2024-02-28 DIAGNOSIS — I35.9 AORTIC VALVE DISEASE: Primary | ICD-10-CM

## 2024-02-28 LAB — INR PPP: 4.8 (ref 0.84–1.19)

## 2024-02-28 PROCEDURE — 93793 ANTICOAG MGMT PT WARFARIN: CPT | Performed by: INTERNAL MEDICINE

## 2024-02-28 NOTE — PROGRESS NOTES
Left message for patient to call office to discuss any changes and if she took her dose last night.     Patient called, states she did not take dose last nigh, states she has been having a glass of wine in the evening, and does  not want to stop, would like to try to adjust dose. Advised to hold again tonight, then take 2.5 mg daily and will recheck Mon 3/4/23    Patient is a home xavier

## 2024-03-05 ENCOUNTER — ANTICOAG VISIT (OUTPATIENT)
Dept: CARDIOLOGY CLINIC | Facility: CLINIC | Age: 59
End: 2024-03-05
Payer: OTHER GOVERNMENT

## 2024-03-05 DIAGNOSIS — I35.9 AORTIC VALVE DISEASE: Primary | ICD-10-CM

## 2024-03-05 DIAGNOSIS — Z95.2 H/O AORTIC VALVE REPLACEMENT: ICD-10-CM

## 2024-03-05 LAB — INR PPP: 2.6 (ref 0.84–1.19)

## 2024-03-05 PROCEDURE — 93793 ANTICOAG MGMT PT WARFARIN: CPT

## 2024-03-05 NOTE — PROGRESS NOTES
Spoke with patient, advised INR good, states he only took 2.5 mg last night, advised will have her take 5 mg Fri, 2.5 mg all other days, will recheck next week 3/11/24    Patient is a home xavier

## 2024-03-26 ENCOUNTER — ANTICOAG VISIT (OUTPATIENT)
Dept: CARDIOLOGY CLINIC | Facility: CLINIC | Age: 59
End: 2024-03-26
Payer: OTHER GOVERNMENT

## 2024-03-26 DIAGNOSIS — Z95.2 H/O AORTIC VALVE REPLACEMENT: ICD-10-CM

## 2024-03-26 DIAGNOSIS — I35.9 AORTIC VALVE DISEASE: Primary | ICD-10-CM

## 2024-03-26 LAB — INR PPP: 2.4 (ref 0.84–1.19)

## 2024-03-26 PROCEDURE — 93793 ANTICOAG MGMT PT WARFARIN: CPT

## 2024-03-26 NOTE — PROGRESS NOTES
Left message for patient, advised INR good, continue 5 mg Fri, 2.5 mg all other days, will recheck in 2 weeks 4/8/24  Advised to call with questions or concerns.    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

## 2024-04-03 ENCOUNTER — OFFICE VISIT (OUTPATIENT)
Dept: CARDIOLOGY CLINIC | Facility: CLINIC | Age: 59
End: 2024-04-03
Payer: OTHER GOVERNMENT

## 2024-04-03 VITALS
BODY MASS INDEX: 30.69 KG/M2 | HEART RATE: 76 BPM | SYSTOLIC BLOOD PRESSURE: 120 MMHG | WEIGHT: 178.8 LBS | DIASTOLIC BLOOD PRESSURE: 70 MMHG

## 2024-04-03 DIAGNOSIS — R00.2 PALPITATIONS: Primary | ICD-10-CM

## 2024-04-03 PROCEDURE — 93000 ELECTROCARDIOGRAM COMPLETE: CPT | Performed by: INTERNAL MEDICINE

## 2024-04-03 PROCEDURE — 99214 OFFICE O/P EST MOD 30 MIN: CPT | Performed by: INTERNAL MEDICINE

## 2024-04-03 NOTE — PROGRESS NOTES
Cardiology             Maddie Rockwell  1965  [id number]              Assessment/Plan:    Remote bioprosthetic aortic valve replacement in 1997 with subsequent mechanical aortic valve replacement 2008 at [facility]  Dyslipidemia  Chronic palpitations, possibly due to PVCs      No significant murmur on examination.  Continue surveillance.  Prior echocardiogram 6/2020 with a normally functioning mechanical valve.  She is aware to use antibiotics for endocarditis prophylaxis before any dental work.  She will need Lovenox bridging for any interruption of warfarin.  Goal INR is 2-3 in the setting of a mechanical aortic valve without other risk factors.  Patient with continued palpitations that he gets on a daily basis, likely secondary to PVCs.  She gets a second type of palpitations which she describes as a racing heartbeat sensation occurring every 1 to 2 months.  Will schedule II week Zio monitor which she is agreeable with today, which she refused last visit.  Initially she was reluctant but felt better knowing that the Zio patch is wireless  Improved lipid panel after rosuvastatin initiated        Follow-up in 1 year.  She will call to discuss results of her Zio monitor over the phone      Interval History:     This is a 58-year-old female with remote bioprosthetic aortic valve replacement 1997 with subsequent mechanical aortic valve replacement 2008 at [facility].  She also has dyslipidemia and history of palpitations due to PVCs.  For this she has been following with Dr. Camacho in the past.    In December 2022, she was changed from pravastatin to rosuvastatin for better lipid control.    She presents today for follow-up.  She states every month or 2 she gets a sensation of a racing heartbeat which last for about 30 minutes with spontaneous resolution.  She feels anxious when this happens and feels that this propagates her palpitations.  She has a different type of palpitations which feels like a  skipped heartbeat, occurring on a daily basis.  These have not changed since the last visit 1 year ago.            Vitals:  Vitals:    24 0840   BP: 120/70   BP Location: Right arm   Patient Position: Sitting   Cuff Size: Adult   Pulse: 76   Weight: 81.1 kg (178 lb 12.8 oz)         Past Medical History:   Diagnosis Date   • Anxiety    • Aortic valve replaced    • Gallstones    • History of transfusion    • Hyperlipidemia    • Kidney stone    • Nephrolithiasis    • Palpitations    • PONV (postoperative nausea and vomiting)    • UTI (urinary tract infection) 10/2021     Social History     Socioeconomic History   • Marital status: /Civil Union     Spouse name: Not on file   • Number of children: Not on file   • Years of education: Not on file   • Highest education level: Not on file   Occupational History   • Not on file   Tobacco Use   • Smoking status: Former     Current packs/day: 0.00     Types: Cigarettes     Quit date:      Years since quittin.2   • Smokeless tobacco: Never   Vaping Use   • Vaping status: Never Used   Substance and Sexual Activity   • Alcohol use: Yes     Comment: weekends   • Drug use: No   • Sexual activity: Not on file   Other Topics Concern   • Not on file   Social History Narrative   • Not on file     Social Determinants of Health     Financial Resource Strain: Not on file   Food Insecurity: Not on file   Transportation Needs: Not on file   Physical Activity: Not on file   Stress: Not on file   Social Connections: Not on file   Intimate Partner Violence: Not on file   Housing Stability: Not on file      Family History   Problem Relation Age of Onset   • Hyperlipidemia Family    • COPD Family    • Diabetes Family      Past Surgical History:   Procedure Laterality Date   • AORTIC VALVE REPLACEMENT  2008    [facility]  [facility]   • CARDIAC SURGERY     • MN LAPAROSCOPY SURG CHOLECYSTECTOMY N/A 11/10/2021    Procedure: CHOLECYSTECTOMY LAPAROSCOPIC W/ ROBOTICS;   Surgeon: Lorena Ballesteros MD;  Location: [facility] OR;  Service: General       Current Outpatient Medications:   •  ALPRAZolam (XANAX) 0.25 mg tablet, Take 1 tablet (0.25 mg total) by mouth see administration instructions One to two tablets daily prn palpitations, Disp: 45 tablet, Rfl: 3  •  propranolol (INDERAL) 10 mg tablet, Take 1 tablet (10 mg total) by mouth 3 (three) times a day as needed (anxiety, palpitations, panic), Disp: 90 tablet, Rfl: 1  •  rosuvastatin (CRESTOR) 10 MG tablet, TAKE ONE TABLET BY MOUTH EVERY DAY, Disp: 90 tablet, Rfl: 2  •  warfarin (COUMADIN) 5 mg tablet, Take 1 tablet (5 mg total) by mouth daily, Disp: 90 tablet, Rfl: 1        Review of Systems:  Review of Systems   Constitutional:  Negative for activity change, fever and unexpected weight change.   HENT:  Negative for facial swelling, nosebleeds and voice change.    Respiratory:  Negative for chest tightness, shortness of breath and wheezing.    Cardiovascular:  Positive for palpitations. Negative for chest pain and leg swelling.   Gastrointestinal:  Negative for abdominal distention.   Genitourinary:  Negative for hematuria.   Musculoskeletal:  Negative for arthralgias.   Skin:  Negative for color change, pallor, rash and wound.   Neurological:  Negative for dizziness, seizures and syncope.   Psychiatric/Behavioral:  Negative for agitation.          Physical Exam:  Physical Exam  Vitals reviewed.   Constitutional:       Appearance: She is well-developed.   HENT:      Head: Normocephalic and atraumatic.   Cardiovascular:      Rate and Rhythm: Normal rate and regular rhythm.      Heart sounds: Normal heart sounds.   Pulmonary:      Effort: Pulmonary effort is normal.      Breath sounds: Normal breath sounds.   Abdominal:      Palpations: Abdomen is soft.   Musculoskeletal:         General: Normal range of motion.      Cervical back: Normal range of motion and neck supple.      Right lower leg: No edema.      Left lower leg: No edema.    Skin:     General: Skin is warm and dry.   Neurological:      Mental Status: She is alert and oriented to person, place, and time.   Psychiatric:         Behavior: Behavior normal.         Thought Content: Thought content normal.         Judgment: Judgment normal.         This note was completed in part utilizing M-Modal Fluency Direct Software.  Grammatical errors, random word insertions, spelling mistakes, and incomplete sentences can be an occasional consequence of this system secondary to software limitations, ambient noise, and hardware issues.  If you have any questions or concerns about the content, text, or information contained within the body of this dictation, please contact the provider for clarification.

## 2024-04-19 DIAGNOSIS — Z95.2 S/P AVR (AORTIC VALVE REPLACEMENT): ICD-10-CM

## 2024-04-19 RX ORDER — WARFARIN SODIUM 5 MG/1
5 TABLET ORAL DAILY
Qty: 90 TABLET | Refills: 1 | Status: SHIPPED | OUTPATIENT
Start: 2024-04-19

## 2024-04-23 ENCOUNTER — TELEPHONE (OUTPATIENT)
Dept: CARDIOLOGY CLINIC | Facility: CLINIC | Age: 59
End: 2024-04-23

## 2024-04-24 ENCOUNTER — ANTICOAG VISIT (OUTPATIENT)
Dept: CARDIOLOGY CLINIC | Facility: CLINIC | Age: 59
End: 2024-04-24
Payer: OTHER GOVERNMENT

## 2024-04-24 DIAGNOSIS — I35.9 AORTIC VALVE DISEASE: Primary | ICD-10-CM

## 2024-04-24 DIAGNOSIS — Z95.2 H/O AORTIC VALVE REPLACEMENT: ICD-10-CM

## 2024-04-24 LAB — INR PPP: 3 (ref 0.84–1.19)

## 2024-04-24 PROCEDURE — 93793 ANTICOAG MGMT PT WARFARIN: CPT

## 2024-04-24 NOTE — PROGRESS NOTES
Left message for patient, advised INR at top of goal, will continue 5 mg Fri, 2.5 mg all other days, will recheck in 2 weeks 5/7/24  Advised to call with questions or concerns.   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

## 2024-05-03 ENCOUNTER — CLINICAL SUPPORT (OUTPATIENT)
Dept: CARDIOLOGY CLINIC | Facility: CLINIC | Age: 59
End: 2024-05-03
Payer: OTHER GOVERNMENT

## 2024-05-03 DIAGNOSIS — I49.3 PVC (PREMATURE VENTRICULAR CONTRACTION): ICD-10-CM

## 2024-05-03 DIAGNOSIS — R00.2 PALPITATIONS: ICD-10-CM

## 2024-05-03 DIAGNOSIS — I47.29 NSVT (NONSUSTAINED VENTRICULAR TACHYCARDIA) (HCC): Primary | ICD-10-CM

## 2024-05-03 PROCEDURE — 93248 EXT ECG>7D<15D REV&INTERPJ: CPT | Performed by: INTERNAL MEDICINE

## 2024-05-03 NOTE — PROGRESS NOTES
Spoke with patient over the phone about Zio results.  Occasional PVCs noted with short ventricular runs and NSVT up to 8 beats.  She does feel her PVCs as her triggers correlated with the same.  She has propranolol at home although she has not used this.  This was given to her initially for anxiety.  I encouraged her to use propranolol as needed for days when she has more palpitations.  She prefers this over taking a beta-blocker on a routine and daily basis.  I encouraged her to call me if her palpitations increase in which case she will be prescribed an acting beta-blocker such as metoprolol succinate to take once a day.  She is in agreement.  Will check echocardiogram to ensure no changes in structure and function, considering her PVCs and palpitations.  She will call after completion of her echocardiogram to discuss results of the phone.  She has no other cardiac symptoms, denying anginal chest pain, lightheadedness, presyncope or syncope.

## 2024-05-06 ENCOUNTER — TELEPHONE (OUTPATIENT)
Dept: CARDIOLOGY CLINIC | Facility: CLINIC | Age: 59
End: 2024-05-06

## 2024-05-22 ENCOUNTER — TELEPHONE (OUTPATIENT)
Age: 59
End: 2024-05-22

## 2024-05-22 NOTE — TELEPHONE ENCOUNTER
Called left voice mail message stating appointment scheduled for tomorrow with Dr. Hunter in the [place] Office. Provided number.

## 2024-05-22 NOTE — TELEPHONE ENCOUNTER
Hello,  Please advise if the following patient can be forced onto the schedule:    Patient: Cass Rockwell    : 1965    MRN: [id number]    Call back #: [phone]    Insurance:     Reason for appointment: Nondisplaced fracture of the distal fibula/xray with [facility]    Requested doctor and/or location: [place]      Thank you.

## 2024-05-23 ENCOUNTER — OFFICE VISIT (OUTPATIENT)
Dept: OBGYN CLINIC | Facility: CLINIC | Age: 59
End: 2024-05-23
Payer: OTHER GOVERNMENT

## 2024-05-23 ENCOUNTER — APPOINTMENT (OUTPATIENT)
Dept: RADIOLOGY | Facility: CLINIC | Age: 59
End: 2024-05-23
Payer: OTHER GOVERNMENT

## 2024-05-23 VITALS
BODY MASS INDEX: 30.41 KG/M2 | HEART RATE: 80 BPM | WEIGHT: 178.12 LBS | RESPIRATION RATE: 16 BRPM | DIASTOLIC BLOOD PRESSURE: 70 MMHG | SYSTOLIC BLOOD PRESSURE: 120 MMHG | HEIGHT: 64 IN

## 2024-05-23 DIAGNOSIS — S82.831A CLOSED AVULSION FRACTURE OF DISTAL END OF RIGHT FIBULA, INITIAL ENCOUNTER: Primary | ICD-10-CM

## 2024-05-23 DIAGNOSIS — M25.571 PAIN, JOINT, ANKLE AND FOOT, RIGHT: ICD-10-CM

## 2024-05-23 PROCEDURE — 73610 X-RAY EXAM OF ANKLE: CPT

## 2024-05-23 PROCEDURE — 99204 OFFICE O/P NEW MOD 45 MIN: CPT | Performed by: STUDENT IN AN ORGANIZED HEALTH CARE EDUCATION/TRAINING PROGRAM

## 2024-05-23 PROCEDURE — 27786 TREATMENT OF ANKLE FRACTURE: CPT | Performed by: STUDENT IN AN ORGANIZED HEALTH CARE EDUCATION/TRAINING PROGRAM

## 2024-05-23 NOTE — PROGRESS NOTES
"Ortho Sports Medicine New Patient Ankle Visit    Assesment:  58 y.o. female right ankle Felix A Distal fibula fracture    Plan:  The patient's diagnosis and treatment were discussed at length today. We discussed no treatment, non-operative treatment, and operative treatment.    Cass presents today for initial evaluation right ankle Felix A distal fibula fracture.  I did review her updated x-rays with her at today's visit demonstrate no syndesmotic widening.  Given this I would like her to continue use of her cam boot and begin outpatient physical therapy in 4 weeks time.  During today's visit and exam she did mention that the cam boot causes increased pain and discomfort, given this we did transition her into an ASO brace.  She can continue use of ice and over-the-counter medication as needed for pain relief.  She is to follow-up in approximately 6 weeks for reevaluation.    Fracture / Dislocation Treatment    Date/Time: 5/23/2024 11:00 AM    Performed by: Ross Hunter DO  Authorized by: Ross Hunter DO    Patient Location:  [facility] Protocol:  Consent: Verbal consent obtained.  Risks and benefits: risks, benefits and alternatives were discussed  Consent given by: patient  Time out: Immediately prior to procedure a \"time out\" was called to verify the correct patient, procedure, equipment, support staff and site/side marked as required.  Timeout called at: 5/23/2024 12:04 PM.  Relevant documents: relevant documents present and verified  Test results: test results available and properly labeled  Radiology Images displayed and confirmed. If images not available, report reviewed: imaging studies available  Patient identity confirmed: verbally with patient    Injury location:  Ankle  Location details:  Right ankle  Injury type:  Fracture  Fracture type: lateral malleolus    Fracture type: lateral malleolus    Neurovascular status: Neurovascularly intact    Distal perfusion: normal    Neurological " function: normal    Range of motion: reduced    Local anesthesia used?: No    General anesthesia used?: No    Manipulation performed?: No    Immobilization:  Brace  Neurovascular status: Neurovascularly intact    Distal perfusion: normal    Neurological function: normal    Range of motion: unchanged    Patient tolerance:  Patient tolerated the procedure well with no immediate complications        Conservative treatment:    Ice to knee for 20 minutes at least 1-2 times daily.  OTC NSAIDS prn for pain.  Lace up ankle brace provided at today's visit.     Imaging:    All imaging from today was reviewed by myself and explained to the patient.   We will obtain xrays of the right ankle on the next visit.      Injection:    No Injection planned at this time.      Surgery:     No surgery is recommended at this point, continue with conservative treatment plan as noted.      Follow up:    No follow-ups on file.      Chief Complaint   Patient presents with    Right Ankle - Pain       History of Present Illness:  The patient is a 58 y.o. female  referred to me by themself, seen in clinic for evaluation of right ankle pain.  She states on 5/20/2024 she was doing yard work and stepped wrong rolling her ankle.  She recalls feeling a cracking sensation followed by significant pain.  She stated the next day she was later seen in urgent care from [facility] due to swelling and bruising.  She was provided a cam boot which she has been compliant with use of, however she does feel like it increases her pain as it is significantly tight.  She denies any previous history of injury or trauma to her ankle.  She denies any numbness or tingling.      Ankle Surgical History:  None      Past Medical, Social and Family History:  Past Medical History:   Diagnosis Date    Anxiety     Aortic valve replaced     Gallstones     History of transfusion     Hyperlipidemia     Kidney stone     Nephrolithiasis     Palpitations     PONV (postoperative  nausea and vomiting)     UTI (urinary tract infection) 10/2021     Past Surgical History:   Procedure Laterality Date    AORTIC VALVE REPLACEMENT  2008    [facility]    CARDIAC SURGERY      [facility] LAPAROSCOPY SURG CHOLECYSTECTOMY N/A 11/10/2021    Procedure: CHOLECYSTECTOMY LAPAROSCOPIC W/ ROBOTICS;  Surgeon: Lorena Ballesteros MD;  Location: [facility] OR;  Service: General     Allergies   Allergen Reactions    Erythromycin Nausea Only and Vomiting    Penicillins Hives    Vicodin  [Hydrocodone-Acetaminophen] Dizziness, Nausea Only and Other (See Comments)     Current Outpatient Medications on File Prior to Visit   Medication Sig Dispense Refill    ALPRAZolam (XANAX) 0.25 mg tablet Take 1 tablet (0.25 mg total) by mouth see administration instructions One to two tablets daily prn palpitations 45 tablet 3    propranolol (INDERAL) 10 mg tablet Take 1 tablet (10 mg total) by mouth 3 (three) times a day as needed (anxiety, palpitations, panic) 90 tablet 1    rosuvastatin (CRESTOR) 10 MG tablet TAKE ONE TABLET BY MOUTH EVERY DAY 90 tablet 2    warfarin (Jantoven) 5 mg tablet Take 1 tablet (5 mg total) by mouth daily As directed 90 tablet 1     No current facility-administered medications on file prior to visit.     Social History     Socioeconomic History    Marital status: /Civil Union     Spouse name: Not on file    Number of children: Not on file    Years of education: Not on file    Highest education level: Not on file   Occupational History    Not on file   Tobacco Use    Smoking status: Former     Current packs/day: 0.00     Types: Cigarettes     Quit date:      Years since quittin.4    Smokeless tobacco: Never   Vaping Use    Vaping status: Never Used   Substance and Sexual Activity    Alcohol use: Yes     Comment: weekends    Drug use: No    Sexual activity: Not on file   Other Topics Concern    Not on file   Social History Narrative    Not on file     Social Determinants of Health  "    Financial Resource Strain: Not on file   Food Insecurity: Not on file   Transportation Needs: Not on file   Physical Activity: Not on file   Stress: Not on file   Social Connections: Not on file   Intimate Partner Violence: Not on file   Housing Stability: Not on file         I have reviewed the past medical, surgical, social and family history, medications and allergies as documented in the EMR.    Review of systems: ROS is negative other than that noted in the HPI.  Constitutional: Negative for fatigue and fever.   HENT: Negative for sore throat.    Respiratory: Negative for shortness of breath.    Cardiovascular: Negative for chest pain.   Gastrointestinal: Negative for abdominal pain.   Endocrine: Negative for cold intolerance and heat intolerance.   Genitourinary: Negative for flank pain.   Musculoskeletal: Negative for back pain.   Skin: Negative for rash.   Allergic/Immunologic: Negative for immunocompromised state.   Neurological: Negative for dizziness.   Psychiatric/Behavioral: Negative for agitation.      Physical Exam:    Blood pressure 120/70, pulse 80, resp. rate 16, height 5' 4\" (1.626 m), weight 80.8 kg (178 lb 1.9 oz).    General/Constitutional: NAD, well developed, well nourished  HENT: Normocephalic, atraumatic  CV: Intact distal pulses, regular rate  Resp: No respiratory distress or labored breathing  Lymphatic: No lymphadenopathy palpated  Neuro: Alert and Oriented x 3, no focal deficits  Psych: Normal mood, normal affect, normal judgement, normal behavior  Skin: Warm, dry, no rashes, no erythema       Ankle Examination (focused):    RIGHT LEFT   ROM:  Limited due to pain Full   Palpation:  Lateral malleolus No tenderness to palpation   Anterior Drawer negative negative   Calcaneal inversion negative negative   Squeeze Test negative negative       No subluxation of the peroneal tendons or tenderness to palpation along the peroneal tendons     No pain with palpation or range of motion of " midfoot and forefoot bilaterally    No limp upon gait exam    No calf tenderness to palpation bilaterally    LE NV Exam: +2 DP/PT pulses bilaterally  Sensation intact to light touch L2-S1 bilaterally        Ankle Imaging:    X-rays of the right foot/ankle were reviewed, which demonstrate nondisplaced Felix a fracture of distal fibula.  No significant displacement.  Stable syndesmosis.  I have reviewed the radiology report and do not currently have a radiology reading from [facility], but will check the result once the reading is performed.        Scribe Attestation      I,:  Hiro Gonsalves am acting as a scribe while in the presence of the attending physician.:       I,:  Ross Hunter, DO personally performed the services described in this documentation    as scribed in my presence.:

## 2024-05-28 ENCOUNTER — TELEPHONE (OUTPATIENT)
Age: 59
End: 2024-05-28

## 2024-05-28 NOTE — TELEPHONE ENCOUNTER
Caller: Patient    Doctor: Dale    Reason for call: Patient has a form for her work she would appreciate having filled out, which indicates she is unable to drive and can work for home.  Advised patient I could give fax number so she could send it directly, but patient unable to fax.    Patient would like to know if there is an email she could send the form to such that the office receives it.  Advised we typically do not accept forms via email, but that sending the document via [employer] is sometimes done.  Patient would still appreciate hearing back.    Please call patient to advise.    Call back#: [phone]

## 2024-05-30 ENCOUNTER — ANTICOAG VISIT (OUTPATIENT)
Dept: CARDIOLOGY CLINIC | Facility: CLINIC | Age: 59
End: 2024-05-30

## 2024-05-30 DIAGNOSIS — I35.9 AORTIC VALVE DISEASE: Primary | ICD-10-CM

## 2024-05-30 DIAGNOSIS — Z95.2 H/O AORTIC VALVE REPLACEMENT: ICD-10-CM

## 2024-05-30 LAB — INR PPP: 2.4 (ref 0.84–1.19)

## 2024-05-30 NOTE — PROGRESS NOTES
Left message for patient, advised INR good, continue 5 mg Fri, 2.5 mg all other days, will recheck in 4 weeks 6/26/24. Advised to call with questions or concerns.  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

## 2024-06-17 ENCOUNTER — HOSPITAL ENCOUNTER (OUTPATIENT)
Dept: NON INVASIVE DIAGNOSTICS | Facility: HOSPITAL | Age: 59
Discharge: HOME | End: 2024-06-17
Payer: OTHER GOVERNMENT

## 2024-06-17 VITALS
HEART RATE: 80 BPM | WEIGHT: 178 LBS | BODY MASS INDEX: 30.39 KG/M2 | HEIGHT: 64 IN | DIASTOLIC BLOOD PRESSURE: 70 MMHG | SYSTOLIC BLOOD PRESSURE: 120 MMHG

## 2024-06-17 DIAGNOSIS — I49.3 PVC (PREMATURE VENTRICULAR CONTRACTION): ICD-10-CM

## 2024-06-17 DIAGNOSIS — I47.29 NSVT (NONSUSTAINED VENTRICULAR TACHYCARDIA) (HCC): ICD-10-CM

## 2024-06-17 PROCEDURE — 93306 TTE W/DOPPLER COMPLETE: CPT

## 2024-06-17 PROCEDURE — 93306 TTE W/DOPPLER COMPLETE: CPT | Performed by: INTERNAL MEDICINE

## 2024-06-18 LAB
AORTIC ROOT: 3.4 CM
AORTIC VALVE MEAN VELOCITY: 14.4 M/S
APICAL FOUR CHAMBER EJECTION FRACTION: 45 %
AV AREA BY CONTINUOUS VTI: 2.3 CM2
AV AREA PEAK VELOCITY: 1.6 CM2
AV LVOT MEAN GRADIENT: 1 MMHG
AV LVOT PEAK GRADIENT: 2 MMHG
AV MEAN GRADIENT: 10 MMHG
AV PEAK GRADIENT: 23 MMHG
AV VALVE AREA: 2.26 CM2
AV VELOCITY RATIO: 0.29
BSA FOR ECHO PROCEDURE: 1.86 M2
DOP CALC AO PEAK VEL: 2.38 M/S
DOP CALC AO VTI: 46.65 CM
DOP CALC LVOT AREA: 5.31 CM2
DOP CALC LVOT CARDIAC INDEX: 3.61 L/MIN/M2
DOP CALC LVOT CARDIAC OUTPUT: 6.72 L/MIN
DOP CALC LVOT DIAMETER: 2.6 CM
DOP CALC LVOT PEAK VEL VTI: 19.87 CM
DOP CALC LVOT PEAK VEL: 0.7 M/S
DOP CALC LVOT STROKE INDEX: 58.6 ML/M2
DOP CALC LVOT STROKE VOLUME: 109
E WAVE DECELERATION TIME: 165 MS
E/A RATIO: 1.36
FRACTIONAL SHORTENING: 21 (ref 28–44)
INTERVENTRICULAR SEPTUM IN DIASTOLE (PARASTERNAL SHORT AXIS VIEW): 0.8 CM
INTERVENTRICULAR SEPTUM: 0.8 CM (ref 0.6–1.1)
LAAS-AP2: 26.1 CM2
LAAS-AP4: 24.1 CM2
LEFT ATRIUM SIZE: 4.2 CM
LEFT ATRIUM VOLUME (MOD BIPLANE): 91 ML
LEFT ATRIUM VOLUME INDEX (MOD BIPLANE): 48.9 ML/M2
LEFT INTERNAL DIMENSION IN SYSTOLE: 4.8 CM (ref 2.1–4)
LEFT VENTRICULAR INTERNAL DIMENSION IN DIASTOLE: 6.1 CM (ref 3.5–6)
LEFT VENTRICULAR POSTERIOR WALL IN END DIASTOLE: 0.8 CM
LEFT VENTRICULAR STROKE VOLUME: 80 ML
LVSV (TEICH): 80 ML
MV E'TISSUE VEL-SEP: 9 CM/S
MV EROA: 0.1 CM2
MV PEAK A VEL: 0.95 M/S
MV PEAK E VEL: 129 CM/S
MV REGURGITANT VOLUME: 16 ML
MV STENOSIS PRESSURE HALF TIME: 48 MS
MV VALVE AREA P 1/2 METHOD: 4.6
PISA MRMAX VEL: 0.3 M/S
PISA RADIUS: 0.5 CM
RIGHT ATRIAL 2D VOLUME: 41 ML
RIGHT ATRIUM AREA SYSTOLE A4C: 16.6 CM2
RIGHT VENTRICLE ID DIMENSION: 4.3 CM
SL CV LEFT ATRIUM LENGTH A2C: 5.7 CM
SL CV PED ECHO LEFT VENTRICLE DIASTOLIC VOLUME (MOD BIPLANE) 2D: 190 ML
SL CV PED ECHO LEFT VENTRICLE SYSTOLIC VOLUME (MOD BIPLANE) 2D: 110 ML
TRICUSPID ANNULAR PLANE SYSTOLIC EXCURSION: 2.8 CM

## 2024-06-19 ENCOUNTER — TELEPHONE (OUTPATIENT)
Dept: CARDIOLOGY CLINIC | Facility: CLINIC | Age: 59
End: 2024-06-19

## 2024-06-19 ENCOUNTER — ANTICOAG VISIT (OUTPATIENT)
Dept: CARDIOLOGY CLINIC | Facility: CLINIC | Age: 59
End: 2024-06-19
Payer: OTHER GOVERNMENT

## 2024-06-19 DIAGNOSIS — Z95.2 H/O AORTIC VALVE REPLACEMENT: ICD-10-CM

## 2024-06-19 DIAGNOSIS — I35.9 AORTIC VALVE DISEASE: Primary | ICD-10-CM

## 2024-06-19 LAB — INR PPP: 2.9 (ref 0.84–1.19)

## 2024-06-19 PROCEDURE — 93793 ANTICOAG MGMT PT WARFARIN: CPT

## 2024-06-19 NOTE — PROGRESS NOTES
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

## 2024-06-19 NOTE — TELEPHONE ENCOUNTER
Spoke w/ pt about results of echo.  LVEF stable, AVR ok, mild to moderate MR noted.  Will repeat echo in 2-3 years.

## 2024-06-19 NOTE — TELEPHONE ENCOUNTER
Spoke with patient about INR, states she saw her Echo results in My Chart and is concerned about the results.  Asking for a call.  Thank you!

## 2024-06-20 ENCOUNTER — EVALUATION (OUTPATIENT)
Dept: PHYSICAL THERAPY | Facility: CLINIC | Age: 59
End: 2024-06-20
Payer: OTHER GOVERNMENT

## 2024-06-20 DIAGNOSIS — S82.831A CLOSED AVULSION FRACTURE OF DISTAL END OF RIGHT FIBULA, INITIAL ENCOUNTER: Primary | ICD-10-CM

## 2024-06-20 PROCEDURE — 97161 PT EVAL LOW COMPLEX 20 MIN: CPT | Performed by: PHYSICAL THERAPIST

## 2024-06-20 PROCEDURE — 97110 THERAPEUTIC EXERCISES: CPT | Performed by: PHYSICAL THERAPIST

## 2024-06-20 NOTE — PROGRESS NOTES
PT Evaluation     Today's date: 2024  Patient name: Maddie Rockwell  : 1965  MRN: [id number]  Referring provider: Ross Hunter DO  Dx:   Encounter Diagnosis     ICD-10-CM    1. Closed avulsion fracture of distal end of right fibula, initial encounter  S82.831A Ambulatory referral to Physical Therapy                     Assessment  Impairments: abnormal gait, abnormal muscle firing, abnormal or restricted ROM, activity intolerance, impaired balance, impaired physical strength, lacks appropriate home exercise program, pain with function and weight-bearing intolerance    Assessment details: Maddie Rockwell is a 58 y.o. female presenting to physical therapy s/p right closed avulsion fracture of distal end of fibula.  Patient presents with pain, decreased strength, decreased range of motion and decreased tolerance to activity. Due to these impairments patient has difficulty performing activities of daily living, recreational activities and engaging in social activities. Patient would benefit from skilled physical therapy services to address these impairments and to maximize function. Thank you for the referral.       Goals    Impairment Goals:  1.) Pt will have decreased sx at rest by 50% in 2-4 weeks.  2.) Pt will have improved ankle strength by 1/2 MMT in 3-4 weeks.  3.) Pt will have decreased tenderness to palpation to lateral malleolus by 50% in 4-6 weeks.  4.) Pt will have improved ankle range of motion to WNL as compared B in 4-6 weeks.    Functional Goals:  1.) Pt will be independent in their home exercise program in 1 week.  2.) Pt will be able to walk on uneven surfaces without difficulty in 6-8 weeks.  3.) Pt will be able to resume all ADL's without ankle pain in 6-8 weeks.  4.) Pt will have an improved FOTO score of 75/100 in 6-8 weeks.    Plan  Patient would benefit from: skilled physical therapy    Planned therapy interventions: home exercise program, therapeutic exercise, strengthening,  stretching, patient education, behavior modification, manual therapy, joint mobilization, work reintegration, gait training, neuromuscular re-education, graded activity and graded exercise    Frequency: 1x week  Plan of Care beginning date: 2024  Plan of Care expiration date: 8/15/2024        Subjective Evaluation    History of Present Illness  Mechanism of injury: Maddie Rockwell is a 58 y.o. female presenting to physical therapy s/p right closed avulsion fracture of distal end of fibula. She presents wearing a ASO brace. She notes that on 2024 she was doing yard work and rolled her ankle after stepping wrong. She felt a crack and severe pain followed by swelling and bruising. She was in a CAM boot for about 2 weeks.     She has been gradually weaning herself from the brace, she wears while she is out in the community. Wants to be walking the boardwalk by the end of August. Overall her pain is controlled, she gets some discomfort on the outside of her ankle and across the front but she is not taking anything for pain control. She is pleased with how things have been progressing and hopes to continue. She denies numbness and tingling or sharp/shooting pain.    Aggs: Going down stairs, carrying >10 lbs,   Patient Goals  Patient goals for therapy: decreased pain, increased strength, return to sport/leisure activities and increased motion    Pain  Current pain ratin  At best pain ratin  At worst pain rating: 3  Quality: dull ache and sharp  Aggravating factors: standing, stair climbing, walking and lifting  Progression: improved          Objective     Palpation     Right   Tenderness of the lateral gastrocnemius, medial gastrocnemius, peroneus and soleus.     Tenderness     Right Ankle/Foot   Tenderness in the anterior talofibular ligament, lateral malleolus, medial malleolus and peroneal tendon. No tenderness in the plantar fascia.     Active Range of Motion     Right Ankle/Foot   Dorsiflexion (ke):  -3 degrees   Plantar flexion: 60 degrees   Inversion: 30 degrees   Eversion: 10 degrees     Strength/Myotome Testing     Left Ankle/Foot   Dorsiflexion: 4+  Plantar flexion: 4+  Inversion: 4+  Eversion: 4+    Right Ankle/Foot   Dorsiflexion: 4  Plantar flexion: 4+  Inversion: 4  Eversion: 4    Swelling     Right Ankle/Foot   Figure 8: 52 cm  Malleoli: 27 cm             Precautions: s/p right closed avulsion fracture of distal end of fibula on 5/20/24, Aortic valve disease, Hx PVCs      Manuals 6/20            PROM R Ankle                                                    Neuro Re-Ed                                                                                                        Ther Ex             4 way TB 2x10 rtb ea            Towel calf s' 10x10''            Towel scrunches 2x10            Bike (Cardiovascular Endurance)                                                                 Ther Activity                                       Gait Training                                       Modalities

## 2024-06-24 DIAGNOSIS — E78.5 DYSLIPIDEMIA: ICD-10-CM

## 2024-06-24 RX ORDER — ROSUVASTATIN CALCIUM 10 MG/1
10 TABLET, COATED ORAL DAILY
Qty: 90 TABLET | Refills: 0 | Status: SHIPPED | OUTPATIENT
Start: 2024-06-24

## 2024-06-27 ENCOUNTER — OFFICE VISIT (OUTPATIENT)
Dept: PHYSICAL THERAPY | Facility: CLINIC | Age: 59
End: 2024-06-27
Payer: OTHER GOVERNMENT

## 2024-06-27 DIAGNOSIS — S82.831A CLOSED AVULSION FRACTURE OF DISTAL END OF RIGHT FIBULA, INITIAL ENCOUNTER: Primary | ICD-10-CM

## 2024-06-27 PROCEDURE — 97110 THERAPEUTIC EXERCISES: CPT | Performed by: PHYSICAL THERAPIST

## 2024-06-27 PROCEDURE — 97140 MANUAL THERAPY 1/> REGIONS: CPT | Performed by: PHYSICAL THERAPIST

## 2024-06-27 PROCEDURE — 97112 NEUROMUSCULAR REEDUCATION: CPT | Performed by: PHYSICAL THERAPIST

## 2024-06-27 NOTE — PROGRESS NOTES
Daily Note     Today's date: 2024  Patient name: Maddie Rockwell  : 1965  MRN: [id number]  Referring provider: Ross Hunter DO  Dx:   Encounter Diagnosis     ICD-10-CM    1. Closed avulsion fracture of distal end of right fibula, initial encounter  S82.831A                      Subjective: Cass explains that things are going well, she has been doing more stairs and notes that they are improving.      Objective: See treatment diary below      Assessment: Tolerated treatment well. Patient demonstrated fatigue post treatment and exhibited good technique with therapeutic exercises. Good response to all manuals this session without increased pain, cueing to correct form during band strengthening. Updated HEP to reflect changes made this session.      Plan: Continue per plan of care.      Precautions: s/p right closed avulsion fracture of distal end of fibula on 24, Aortic valve disease, Hx PVCs      Manuals            PROM R Ankle  RN                                                  Neuro Re-Ed                                                                                                        Ther Ex             4 way TB 2x10 rtb ea 2x10 rtb ea           Towel calf s' 10x10'' 10x10''           Towel scrunches 2x10 2x10           Bike (Cardiovascular Endurance)  nv           Standing calf s'  5x15''                                                  Ther Activity             Step ups             Step up and overs  2x10 lvl 2           Gait Training                                       Modalities

## 2024-07-11 ENCOUNTER — OFFICE VISIT (OUTPATIENT)
Dept: OBGYN CLINIC | Facility: CLINIC | Age: 59
End: 2024-07-11

## 2024-07-11 ENCOUNTER — APPOINTMENT (OUTPATIENT)
Dept: RADIOLOGY | Facility: CLINIC | Age: 59
End: 2024-07-11
Payer: OTHER GOVERNMENT

## 2024-07-11 VITALS
SYSTOLIC BLOOD PRESSURE: 134 MMHG | DIASTOLIC BLOOD PRESSURE: 82 MMHG | HEIGHT: 64 IN | HEART RATE: 75 BPM | WEIGHT: 178 LBS | RESPIRATION RATE: 17 BRPM | BODY MASS INDEX: 30.39 KG/M2

## 2024-07-11 DIAGNOSIS — M25.551 PAIN IN RIGHT HIP: ICD-10-CM

## 2024-07-11 DIAGNOSIS — S82.831A CLOSED AVULSION FRACTURE OF DISTAL END OF RIGHT FIBULA, INITIAL ENCOUNTER: Primary | ICD-10-CM

## 2024-07-11 DIAGNOSIS — S82.831A CLOSED AVULSION FRACTURE OF DISTAL END OF RIGHT FIBULA, INITIAL ENCOUNTER: ICD-10-CM

## 2024-07-11 PROCEDURE — 99024 POSTOP FOLLOW-UP VISIT: CPT | Performed by: STUDENT IN AN ORGANIZED HEALTH CARE EDUCATION/TRAINING PROGRAM

## 2024-07-11 PROCEDURE — 73600 X-RAY EXAM OF ANKLE: CPT

## 2024-07-11 NOTE — LETTER
July 11, 2024     Patient: Maddie Rockwell  YOB: 1965  Date of Visit: 7/11/2024      To Whom it May Concern:    Maddie Rockwell is under my professional care. Maddie was seen in my office on 7/11/2024. Maddie is cleared to return to work without restriction on 7/15/24. Thank you.     If you have any questions or concerns, please don't hesitate to call.         Sincerely,          Ross Hunter,         CC: No Recipients

## 2024-07-11 NOTE — LETTER
July 11, 2024     Walter Eldridge PA-C  [address]  [address]  [facility] [address]    Patient: Maddie Rockwell   YOB: 1965   Date of Visit: 7/11/2024       Dear Dr. Eldridge:    Thank you for referring Maddie Rockwell to me for evaluation. Below are my notes for this consultation.    If you have questions, please do not hesitate to call me. I look forward to following your patient along with you.         Sincerely,        Ross Hunter DO        CC: No Recipients    Ross Hunter DO  7/11/2024 11:55 AM  Sign when Signing Visit  Ortho Sports Medicine New Patient Ankle Visit    Assesment:  58 y.o. female right ankle Felix A Distal fibula fracture sustained on 5/20/24    Plan:  The patient's diagnosis and treatment were discussed at length today. We discussed no treatment, non-operative treatment, and operative treatment.    Cass presents today for follow up of right ankle Felix A distal fibula fracture. Her xray today demonstrates minimal osseous healing at fx site without signs of displacement or malunion. There are also signs of diffuse demineralization and recommended calcium and vitamin D.  I will also reach out to her PCP about possibility of obtaining a DEXA scan in the future.  Her main complaints today are swelling and point tenderness over her fx site which continues to improve. She may continue physical therapy/home exercise program as directed. She may do all activities without restriction, let pain be a guide. Recommend Vitamin D 1200 units per day and Calcium 800 mg per day. She is cleared to return to work without restriction. Work note provided. Patient also has complaints today of lateral hip pain that radiates to knee. She will schedule a follow up appointment to have this addressed. Right hip xrays are needed. Patient may follow up on an as needed basis in regards to her right ankle fracture.      Conservative treatment:    Ice to knee for 20 minutes at least 1-2  times daily.  OTC NSAIDS prn for pain.  Lace up ankle brace provided at today's visit.     Imaging:    All imaging from today was reviewed by myself and explained to the patient.       Injection:    No Injection planned at this time.      Surgery:     No surgery is recommended at this point, continue with conservative treatment plan as noted.      Follow up:    PRN       Chief Complaint   Patient presents with   • Right Ankle - Post-op     Sx: 05/23/24       History of Present Illness:  The patient is a 58 y.o. female  seen in follow up for right ankle fracture. Patient states she is doing well and returned to activities full time without significant issue. She does have tenderness to palpation over the fx site and swelling. No new symptoms, numbness, tingling. No other orthopedic conditions discussed today.      Ankle Surgical History:  None      Past Medical, Social and Family History:  Past Medical History:   Diagnosis Date   • Anxiety    • Aortic valve replaced    • Gallstones    • History of transfusion    • Hyperlipidemia    • Kidney stone    • Nephrolithiasis    • Palpitations    • PONV (postoperative nausea and vomiting)    • UTI (urinary tract infection) 10/2021     Past Surgical History:   Procedure Laterality Date   • AORTIC VALVE REPLACEMENT  05/2008    [facility]   • CARDIAC SURGERY     • [place] LAPAROSCOPY SURG CHOLECYSTECTOMY N/A 11/10/2021    Procedure: CHOLECYSTECTOMY LAPAROSCOPIC W/ ROBOTICS;  Surgeon: Lorena Ballesteros MD;  Location: [facility] OR;  Service: General     Allergies   Allergen Reactions   • Erythromycin Nausea Only and Vomiting   • Penicillins Hives   • Vicodin  [Hydrocodone-Acetaminophen] Dizziness, Nausea Only and Other (See Comments)     Current Outpatient Medications on File Prior to Visit   Medication Sig Dispense Refill   • ALPRAZolam (XANAX) 0.25 mg tablet Take 1 tablet (0.25 mg total) by mouth see administration instructions One to two tablets daily prn palpitations 45  tablet 3   • propranolol (INDERAL) 10 mg tablet Take 1 tablet (10 mg total) by mouth 3 (three) times a day as needed (anxiety, palpitations, panic) 90 tablet 1   • rosuvastatin (CRESTOR) 10 MG tablet TAKE ONE TABLET BY MOUTH EVERY DAY 90 tablet 0   • warfarin (Jantoven) 5 mg tablet Take 1 tablet (5 mg total) by mouth daily As directed 90 tablet 1     No current facility-administered medications on file prior to visit.     Social History     Socioeconomic History   • Marital status: /Civil Union     Spouse name: Not on file   • Number of children: Not on file   • Years of education: Not on file   • Highest education level: Not on file   Occupational History   • Not on file   Tobacco Use   • Smoking status: Former     Current packs/day: 0.00     Types: Cigarettes     Quit date:      Years since quittin.5   • Smokeless tobacco: Never   Vaping Use   • Vaping status: Never Used   Substance and Sexual Activity   • Alcohol use: Yes     Comment: weekends   • Drug use: No   • Sexual activity: Not on file   Other Topics Concern   • Not on file   Social History Narrative   • Not on file     Social Determinants of Health     Financial Resource Strain: Not on file   Food Insecurity: Not on file   Transportation Needs: Not on file   Physical Activity: Not on file   Stress: Not on file   Social Connections: Not on file   Intimate Partner Violence: Not on file   Housing Stability: Not on file         I have reviewed the past medical, surgical, social and family history, medications and allergies as documented in the EMR.    Review of systems: ROS is negative other than that noted in the HPI.  Constitutional: Negative for fatigue and fever.   HENT: Negative for sore throat.    Respiratory: Negative for shortness of breath.    Cardiovascular: Negative for chest pain.   Gastrointestinal: Negative for abdominal pain.   Endocrine: Negative for cold intolerance and heat intolerance.   Genitourinary: Negative for flank  "pain.   Musculoskeletal: Negative for back pain.   Skin: Negative for rash.   Allergic/Immunologic: Negative for immunocompromised state.   Neurological: Negative for dizziness.   Psychiatric/Behavioral: Negative for agitation.      Physical Exam:    Blood pressure 134/82, pulse 75, resp. rate 17, height 5' 4\" (1.626 m), weight 80.7 kg (178 lb).    General/Constitutional: NAD, well developed, well nourished  HENT: Normocephalic, atraumatic  CV: Intact distal pulses, regular rate  Resp: No respiratory distress or labored breathing  Lymphatic: No lymphadenopathy palpated  Neuro: Alert and Oriented x 3, no focal deficits  Psych: Normal mood, normal affect, normal judgement, normal behavior  Skin: Warm, dry, no rashes, no erythema       Ankle Examination (focused):    RIGHT LEFT   ROM:  Limited due to pain Full   Palpation:  Lateral malleolus No tenderness to palpation   Anterior Drawer negative negative   Calcaneal inversion negative negative   Squeeze Test negative negative       No subluxation of the peroneal tendons or tenderness to palpation along the peroneal tendons     No pain with palpation or range of motion of midfoot and forefoot bilaterally    No limp upon gait exam    No calf tenderness to palpation bilaterally      LE NV Exam: +2 DP/PT pulses bilaterally  Sensation intact to light touch L2-S1 bilaterally        Ankle Imaging:    X-rays of the right foot/ankle were reviewed, which demonstrate osseous healing of nondisplaced Felix a fracture of distal fibula.  No significant displacement.  Continued stable syndesmosis.  I have reviewed the radiology report and do not currently have a radiology reading from [facility], but will check the result once the reading is performed.        Scribe Attestation      I,:  Michael Gutierrez PA-C am acting as a scribe while in the presence of the attending physician.:       I,:  Ross Hunter DO personally performed the services described in this documentation    as " scribed in my presence.:

## 2024-07-11 NOTE — PROGRESS NOTES
Ortho Sports Medicine New Patient Ankle Visit    Assesment:  58 y.o. female right ankle Felix A Distal fibula fracture sustained on 5/20/24    Plan:  The patient's diagnosis and treatment were discussed at length today. We discussed no treatment, non-operative treatment, and operative treatment.    Cass presents today for follow up of right ankle Felix A distal fibula fracture. Her xray today demonstrates minimal osseous healing at fx site without signs of displacement or malunion. There are also signs of diffuse demineralization and recommended calcium and vitamin D.  I will also reach out to her PCP about possibility of obtaining a DEXA scan in the future.  Her main complaints today are swelling and point tenderness over her fx site which continues to improve. She may continue physical therapy/home exercise program as directed. She may do all activities without restriction, let pain be a guide. Recommend Vitamin D 1200 units per day and Calcium 800 mg per day. She is cleared to return to work without restriction. Work note provided. Patient also has complaints today of lateral hip pain that radiates to knee. She will schedule a follow up appointment to have this addressed. Right hip xrays are needed. Patient may follow up on an as needed basis in regards to her right ankle fracture.      Conservative treatment:    Ice to knee for 20 minutes at least 1-2 times daily.  OTC NSAIDS prn for pain.  Lace up ankle brace provided at today's visit.     Imaging:    All imaging from today was reviewed by myself and explained to the patient.       Injection:    No Injection planned at this time.      Surgery:     No surgery is recommended at this point, continue with conservative treatment plan as noted.      Follow up:    PRN       Chief Complaint   Patient presents with    Right Ankle - Post-op     Sx: 05/23/24       History of Present Illness:  The patient is a 58 y.o. female  seen in follow up for right ankle fracture.  Patient states she is doing well and returned to activities full time without significant issue. She does have tenderness to palpation over the fx site and swelling. No new symptoms, numbness, tingling. No other orthopedic conditions discussed today.      Ankle Surgical History:  None      Past Medical, Social and Family History:  Past Medical History:   Diagnosis Date    Anxiety     Aortic valve replaced     Gallstones     History of transfusion     Hyperlipidemia     Kidney stone     Nephrolithiasis     Palpitations     PONV (postoperative nausea and vomiting)     UTI (urinary tract infection) 10/2021     Past Surgical History:   Procedure Laterality Date    AORTIC VALVE REPLACEMENT  05/2008    [facility]    CARDIAC SURGERY      [place] LAPAROSCOPY SURG CHOLECYSTECTOMY N/A 11/10/2021    Procedure: CHOLECYSTECTOMY LAPAROSCOPIC W/ ROBOTICS;  Surgeon: Lorena Ballesteros MD;  Location: [facility] OR;  Service: General     Allergies   Allergen Reactions    Erythromycin Nausea Only and Vomiting    Penicillins Hives    Vicodin  [Hydrocodone-Acetaminophen] Dizziness, Nausea Only and Other (See Comments)     Current Outpatient Medications on File Prior to Visit   Medication Sig Dispense Refill    ALPRAZolam (XANAX) 0.25 mg tablet Take 1 tablet (0.25 mg total) by mouth see administration instructions One to two tablets daily prn palpitations 45 tablet 3    propranolol (INDERAL) 10 mg tablet Take 1 tablet (10 mg total) by mouth 3 (three) times a day as needed (anxiety, palpitations, panic) 90 tablet 1    rosuvastatin (CRESTOR) 10 MG tablet TAKE ONE TABLET BY MOUTH EVERY DAY 90 tablet 0    warfarin (Jantoven) 5 mg tablet Take 1 tablet (5 mg total) by mouth daily As directed 90 tablet 1     No current facility-administered medications on file prior to visit.     Social History     Socioeconomic History    Marital status: /Civil Union     Spouse name: Not on file    Number of children: Not on file    Years of  "education: Not on file    Highest education level: Not on file   Occupational History    Not on file   Tobacco Use    Smoking status: Former     Current packs/day: 0.00     Types: Cigarettes     Quit date:      Years since quittin.5    Smokeless tobacco: Never   Vaping Use    Vaping status: Never Used   Substance and Sexual Activity    Alcohol use: Yes     Comment: weekends    Drug use: No    Sexual activity: Not on file   Other Topics Concern    Not on file   Social History Narrative    Not on file     Social Determinants of Health     Financial Resource Strain: Not on file   Food Insecurity: Not on file   Transportation Needs: Not on file   Physical Activity: Not on file   Stress: Not on file   Social Connections: Not on file   Intimate Partner Violence: Not on file   Housing Stability: Not on file         I have reviewed the past medical, surgical, social and family history, medications and allergies as documented in the EMR.    Review of systems: ROS is negative other than that noted in the HPI.  Constitutional: Negative for fatigue and fever.   HENT: Negative for sore throat.    Respiratory: Negative for shortness of breath.    Cardiovascular: Negative for chest pain.   Gastrointestinal: Negative for abdominal pain.   Endocrine: Negative for cold intolerance and heat intolerance.   Genitourinary: Negative for flank pain.   Musculoskeletal: Negative for back pain.   Skin: Negative for rash.   Allergic/Immunologic: Negative for immunocompromised state.   Neurological: Negative for dizziness.   Psychiatric/Behavioral: Negative for agitation.      Physical Exam:    Blood pressure 134/82, pulse 75, resp. rate 17, height 5' 4\" (1.626 m), weight 80.7 kg (178 lb).    General/Constitutional: NAD, well developed, well nourished  HENT: Normocephalic, atraumatic  CV: Intact distal pulses, regular rate  Resp: No respiratory distress or labored breathing  Lymphatic: No lymphadenopathy palpated  Neuro: Alert and " Oriented x 3, no focal deficits  Psych: Normal mood, normal affect, normal judgement, normal behavior  Skin: Warm, dry, no rashes, no erythema       Ankle Examination (focused):    RIGHT LEFT   ROM:  Limited due to pain Full   Palpation:  Lateral malleolus No tenderness to palpation   Anterior Drawer negative negative   Calcaneal inversion negative negative   Squeeze Test negative negative       No subluxation of the peroneal tendons or tenderness to palpation along the peroneal tendons     No pain with palpation or range of motion of midfoot and forefoot bilaterally    No limp upon gait exam    No calf tenderness to palpation bilaterally      LE NV Exam: +2 DP/PT pulses bilaterally  Sensation intact to light touch L2-S1 bilaterally        Ankle Imaging:    X-rays of the right foot/ankle were reviewed, which demonstrate osseous healing of nondisplaced Felix a fracture of distal fibula.  No significant displacement.  Continued stable syndesmosis.  I have reviewed the radiology report and do not currently have a radiology reading from [facility], but will check the result once the reading is performed.        Scribe Attestation      I,:  Michael Gutierrez PA-C am acting as a scribe while in the presence of the attending physician.:       I,:  Ross Hunter, DO personally performed the services described in this documentation    as scribed in my presence.:

## 2024-07-25 ENCOUNTER — APPOINTMENT (OUTPATIENT)
Dept: RADIOLOGY | Facility: CLINIC | Age: 59
End: 2024-07-25
Payer: OTHER GOVERNMENT

## 2024-07-25 ENCOUNTER — OFFICE VISIT (OUTPATIENT)
Dept: OBGYN CLINIC | Facility: CLINIC | Age: 59
End: 2024-07-25

## 2024-07-25 VITALS
WEIGHT: 178 LBS | SYSTOLIC BLOOD PRESSURE: 125 MMHG | HEART RATE: 79 BPM | BODY MASS INDEX: 30.39 KG/M2 | HEIGHT: 64 IN | DIASTOLIC BLOOD PRESSURE: 77 MMHG | RESPIRATION RATE: 17 BRPM

## 2024-07-25 DIAGNOSIS — M25.551 PAIN IN RIGHT HIP: Primary | ICD-10-CM

## 2024-07-25 DIAGNOSIS — M25.551 PAIN IN RIGHT HIP: ICD-10-CM

## 2024-07-25 PROCEDURE — 99024 POSTOP FOLLOW-UP VISIT: CPT | Performed by: STUDENT IN AN ORGANIZED HEALTH CARE EDUCATION/TRAINING PROGRAM

## 2024-07-25 PROCEDURE — 73502 X-RAY EXAM HIP UNI 2-3 VIEWS: CPT

## 2024-07-25 NOTE — PROGRESS NOTES
Ortho Sports Medicine New Patient Hip Visit    Assesment:   58 y.o. female right hip trochanteric bursitis    Plan:    Cass is present in office for evaluation of her right hip. X-ray was performed and reviewed with the patient, at this time discussed that she does not have any acute osseous abnormality and has mild osteoarthritis. At this time patient was recommended and given home exercises to perform at home. Recommended and offered greater trochanter bursa corticosteroid injection, declined by patient at this time. Discussed that if she continues to experience discomfort or if symptoms in the meantime she can call and schedule an appointment for corticosteroid injection, in the meantime she should do home exercises. Patient is to follow up as needed, if symptoms progress discussed that she can call and we can place formal PT referral.     Conservative Treatment:     Ice to hip region for 20 minutes at least 1-2 times daily.  PT for ROM/strengthening to hip, back, legs and core  OTC NSAIDS prn for pain.  Tylenol for pain.      Imaging:    All imaging from today was reviewed by myself and explained to the patient.       Injection:      No Injection planned at this time.      Surgery:    No surgery is recommended at this point, continue with conservative treatment plan as noted.      Follow up:    Return if symptoms worsen or fail to improve.        Chief Complaint   Patient presents with    Right Hip - Follow-up       History of Present Illness:  The patient is a 58 y.o. female whose seen in clinic for evaluation of right hip pain. Patient reports dull achy pain at the lateral hip at region of greater trochanter, states that the pain is 3-4/10 pain that is dull and achy. Patient reports that she has been getting episodes on and off of lateral hip pain for the last 5 years. Reports the past 4 weeks she has had constant lateral hip pain and is worsened with touching an laying on her right side. Has tried ibuprofen  and tylenol as needed.         Hip Surgical History:  None      Past Medical, Social and Family History:  Past Medical History:   Diagnosis Date    Anxiety     Aortic valve replaced     Gallstones     History of transfusion     Hyperlipidemia     Kidney stone     Nephrolithiasis     Palpitations     PONV (postoperative nausea and vomiting)     UTI (urinary tract infection) 10/2021     Past Surgical History:   Procedure Laterality Date    AORTIC VALVE REPLACEMENT  05/2008    [facility]    CARDIAC SURGERY      [place] LAPAROSCOPY SURG CHOLECYSTECTOMY N/A 11/10/2021    Procedure: CHOLECYSTECTOMY LAPAROSCOPIC W/ ROBOTICS;  Surgeon: Lorena Ballesteros MD;  Location: [facility] OR;  Service: General     Allergies   Allergen Reactions    Erythromycin Nausea Only and Vomiting    Penicillins Hives    Vicodin  [Hydrocodone-Acetaminophen] Dizziness, Nausea Only and Other (See Comments)     Current Outpatient Medications on File Prior to Visit   Medication Sig Dispense Refill    ALPRAZolam (XANAX) 0.25 mg tablet Take 1 tablet (0.25 mg total) by mouth see administration instructions One to two tablets daily prn palpitations 45 tablet 3    propranolol (INDERAL) 10 mg tablet Take 1 tablet (10 mg total) by mouth 3 (three) times a day as needed (anxiety, palpitations, panic) 90 tablet 1    rosuvastatin (CRESTOR) 10 MG tablet TAKE ONE TABLET BY MOUTH EVERY DAY 90 tablet 0    warfarin (Jantoven) 5 mg tablet Take 1 tablet (5 mg total) by mouth daily As directed 90 tablet 1     No current facility-administered medications on file prior to visit.     Social History     Socioeconomic History    Marital status: /Civil Union     Spouse name: Not on file    Number of children: Not on file    Years of education: Not on file    Highest education level: Not on file   Occupational History    Not on file   Tobacco Use    Smoking status: Former     Current packs/day: 0.00     Types: Cigarettes     Quit date: 1997     Years since  "quittin.5    Smokeless tobacco: Never   Vaping Use    Vaping status: Never Used   Substance and Sexual Activity    Alcohol use: Yes     Comment: weekends    Drug use: No    Sexual activity: Not on file   Other Topics Concern    Not on file   Social History Narrative    Not on file     Social Determinants of Health     Financial Resource Strain: Not on file   Food Insecurity: Not on file   Transportation Needs: Not on file   Physical Activity: Not on file   Stress: Not on file   Social Connections: Not on file   Intimate Partner Violence: Not on file   Housing Stability: Not on file         I have reviewed the past medical, surgical, social and family history, medications and allergies as documented in the EMR.    Review of systems: ROS is negative other than that noted in the HPI.  Constitutional: Negative for fatigue and fever.   HENT: Negative for sore throat.    Respiratory: Negative for shortness of breath.    Cardiovascular: Negative for chest pain.   Gastrointestinal: Negative for abdominal pain.   Endocrine: Negative for cold intolerance and heat intolerance.   Genitourinary: Negative for flank pain.   Musculoskeletal: Negative for back pain.   Skin: Negative for rash.   Allergic/Immunologic: Negative for immunocompromised state.   Neurological: Negative for dizziness.   Psychiatric/Behavioral: Negative for agitation.      Physical Exam:    Blood pressure 125/77, pulse 79, resp. rate 17, height 5' 4\" (1.626 m), weight 80.7 kg (178 lb).    General/Constitutional: NAD, well developed, well nourished  HENT: Normocephalic, atraumatic  CV: Intact distal pulses, regular rate  Resp: No respiratory distress or labored breathing  Abd: No abdominal distention  Lymphatic: No lymphadenopathy palpated  Neuro: Alert and Oriented x 3, no focal deficits noted  Psych: Normal mood, normal affect, normal judgement, normal behavior  Skin: Warm, dry, no rashes, no erythema     Hip Exam (focused):    right hip:     No " dislocation/deformity  ROM: Full  Greater troch:tender   SI joint: non-tender  Mary test: negative  Nain's test:  negative  Abduction: 5/5  AT/GS intact  Zuniga's Test: negative  Tiago Test: negative    Back:    No TTP over lumbar spinous processes, paraspinal musculature  Negative SLR     No calf tenderness to palpation bilaterally    LE NV Exam: +2 DP/PT pulses bilaterally  Sensation intact to light touch L2-S1 bilaterally      Hip Imaging:    X-rays of the right hip were reviewed, which demonstrate mild osteoarthritis without acute osseous abnormality.  I do not currently have a radiology reading from [facility], but will check the result once the reading is performed.          Scribe Attestation      I,:  Calista Burton PA-C am acting as a scribe while in the presence of the attending physician.:       I,:  Ross Hunter DO personally performed the services described in this documentation    as scribed in my presence.:

## 2024-08-01 ENCOUNTER — TELEPHONE (OUTPATIENT)
Dept: CARDIOLOGY CLINIC | Facility: CLINIC | Age: 59
End: 2024-08-01

## 2024-08-05 ENCOUNTER — ANTICOAG VISIT (OUTPATIENT)
Dept: CARDIOLOGY CLINIC | Facility: CLINIC | Age: 59
End: 2024-08-05

## 2024-08-05 ENCOUNTER — VBI (OUTPATIENT)
Dept: ADMINISTRATIVE | Facility: OTHER | Age: 59
End: 2024-08-05

## 2024-08-05 DIAGNOSIS — I35.9 AORTIC VALVE DISEASE: Primary | ICD-10-CM

## 2024-08-05 DIAGNOSIS — Z95.2 H/O AORTIC VALVE REPLACEMENT: ICD-10-CM

## 2024-08-05 LAB — INR PPP: 2.9 (ref 0.85–1.19)

## 2024-08-05 NOTE — PROGRESS NOTES
Left message for patient, advised INR good, continue 5 mg Fri, 2.5 mg all other days, will recheck in 4 weeks 8/30/24  Advised to call with questions or concerns.    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

## 2024-08-06 NOTE — TELEPHONE ENCOUNTER
08/06/24 10:13 AM    The patient was called and a message was left with the return number for Central Scheduling [phone].    Thank you.  FACUNDO LAWLER MA  [facility]

## 2024-09-09 ENCOUNTER — ANTICOAG VISIT (OUTPATIENT)
Dept: CARDIOLOGY CLINIC | Facility: CLINIC | Age: 59
End: 2024-09-09
Payer: OTHER GOVERNMENT

## 2024-09-09 DIAGNOSIS — Z95.2 H/O AORTIC VALVE REPLACEMENT: ICD-10-CM

## 2024-09-09 DIAGNOSIS — I35.9 AORTIC VALVE DISEASE: Primary | ICD-10-CM

## 2024-09-09 LAB — INR PPP: 2.3 (ref 0.85–1.19)

## 2024-09-09 PROCEDURE — 93793 ANTICOAG MGMT PT WARFARIN: CPT

## 2024-09-09 NOTE — PROGRESS NOTES
Left message for patient, advised INR good, continue 5 mg Fri, 2.5 mg all other days, will recheck in 4 weeks 10/7/24  Advised to call with questions or concerns.    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

## 2024-09-12 ENCOUNTER — OFFICE VISIT (OUTPATIENT)
Dept: FAMILY MEDICINE CLINIC | Facility: CLINIC | Age: 59
End: 2024-09-12
Payer: OTHER GOVERNMENT

## 2024-09-12 VITALS
WEIGHT: 172.8 LBS | HEART RATE: 80 BPM | DIASTOLIC BLOOD PRESSURE: 78 MMHG | SYSTOLIC BLOOD PRESSURE: 120 MMHG | BODY MASS INDEX: 29.5 KG/M2 | RESPIRATION RATE: 16 BRPM | TEMPERATURE: 97.9 F | HEIGHT: 64 IN

## 2024-09-12 DIAGNOSIS — I49.3 PVCS (PREMATURE VENTRICULAR CONTRACTIONS): ICD-10-CM

## 2024-09-12 DIAGNOSIS — Z95.2 H/O AORTIC VALVE REPLACEMENT: ICD-10-CM

## 2024-09-12 DIAGNOSIS — J40 BRONCHITIS: Primary | ICD-10-CM

## 2024-09-12 DIAGNOSIS — E78.5 HYPERLIPIDEMIA, UNSPECIFIED HYPERLIPIDEMIA TYPE: ICD-10-CM

## 2024-09-12 PROCEDURE — 99214 OFFICE O/P EST MOD 30 MIN: CPT | Performed by: PHYSICIAN ASSISTANT

## 2024-09-12 RX ORDER — DEXTROMETHORPHAN HYDROBROMIDE AND PROMETHAZINE HYDROCHLORIDE 15; 6.25 MG/5ML; MG/5ML
5 SYRUP ORAL 4 TIMES DAILY PRN
Qty: 120 ML | Refills: 0 | Status: SHIPPED | OUTPATIENT
Start: 2024-09-12

## 2024-09-12 RX ORDER — SULFAMETHOXAZOLE/TRIMETHOPRIM 800-160 MG
1 TABLET ORAL EVERY 12 HOURS SCHEDULED
Qty: 20 TABLET | Refills: 0 | Status: SHIPPED | OUTPATIENT
Start: 2024-09-12 | End: 2024-09-22

## 2024-09-12 NOTE — PATIENT INSTRUCTIONS
Assessment/plan:  1.  Acute bronchitis-recommend starting on Bactrim DS, 1 tablet twice daily for 10 days.  Patient also given Promethazine DM cough syrup, 1 teaspoon every 6 hours as needed for cough.  Patient was cautioned of drowsiness with this medication.  Recommend follow-up in the next week if symptoms are not improving.  2.  History of aortic valve disease-presently stable on warfarin therapy.  Patient is advised to have her INR checked after being on Bactrim for 4 to 5 days.  3.  Hyperlipidemia-stable on rosuvastatin 10 mg daily, no medication changes.  4.  PVCs-recommend continuing with propranolol 10 mg.  Would recommend avoiding decongestant therapies.

## 2024-09-12 NOTE — PROGRESS NOTES
Ambulatory Visit  Name: Maddie Rockwell      : 1965      MRN: [id number]  Encounter Provider: Walter Eldridge PA-C  Encounter Date: 2024   Encounter department: [facility] PRIMARY CARE  Patient Instructions   Assessment/plan:  1.  Acute bronchitis-recommend starting on Bactrim DS, 1 tablet twice daily for 10 days.  Patient also given Promethazine DM cough syrup, 1 teaspoon every 6 hours as needed for cough.  Patient was cautioned of drowsiness with this medication.  Recommend follow-up in the next week if symptoms are not improving.  2.  History of aortic valve disease-presently stable on warfarin therapy.  Patient is advised to have her INR checked after being on Bactrim for 4 to 5 days.  3.  Hyperlipidemia-stable on rosuvastatin 10 mg daily, no medication changes.  4.  PVCs-recommend continuing with propranolol 10 mg.  Would recommend avoiding decongestant therapies.     History of Present Illness     HPI: This is a 58-year-old female who presents to the office with concerns over chest congestion and cough that started about 3 weeks ago and has been getting worse.  She has had some sinus congestion with that as well.  She did have a COVID test in the middle of the symptoms which was negative.  She feels that the cough is just not letting up and she feels it is mostly dry but feels a rattling in the chest at times.  She does have a history of aortic valve disease and continues warfarin therapy long-term.  She also has hyperlipidemia and continues on rosuvastatin and she has had a history of PVCs so she needs to avoid decongestant therapies.          Review of Systems   Constitutional:  Negative for appetite change, chills, diaphoresis, fatigue and fever.   HENT:  Positive for congestion, sinus pressure and sinus pain. Negative for ear pain, facial swelling, postnasal drip, rhinorrhea, sore throat, trouble swallowing and voice change.    Respiratory:  Positive for cough and wheezing. Negative  "for chest tightness and shortness of breath.    Cardiovascular:  Negative for chest pain.   Gastrointestinal:  Negative for abdominal pain, diarrhea, nausea and vomiting.   Musculoskeletal:  Negative for myalgias.   Neurological:  Negative for headaches.           Objective     /78 (BP Location: Left arm, Patient Position: Sitting, Cuff Size: Adult)   Pulse 80   Temp 97.9 °F (36.6 °C) (Temporal)   Resp 16   Ht 5' 4\" (1.626 m)   Wt 78.4 kg (172 lb 12.8 oz)   BMI 29.66 kg/m²     Physical Exam  Constitutional:       General: She is not in acute distress.     Appearance: Normal appearance.   HENT:      Head: Normocephalic and atraumatic.      Right Ear: Tympanic membrane normal.      Left Ear: Tympanic membrane normal.      Nose: No congestion or rhinorrhea.   Eyes:      Conjunctiva/sclera: Conjunctivae normal.      Pupils: Pupils are equal, round, and reactive to light.   Neck:      Vascular: No carotid bruit.   Cardiovascular:      Rate and Rhythm: Normal rate and regular rhythm.      Heart sounds: No murmur heard.  Pulmonary:      Effort: Pulmonary effort is normal. No respiratory distress.      Comments: Harsh crackles bilaterally.  Abdominal:      Palpations: Abdomen is soft.   Musculoskeletal:         General: Normal range of motion.      Cervical back: Normal range of motion and neck supple. No muscular tenderness.   Lymphadenopathy:      Cervical: No cervical adenopathy.   Skin:     General: Skin is warm.      Capillary Refill: Capillary refill takes less than 2 seconds.   Neurological:      General: No focal deficit present.      Mental Status: She is alert and oriented to person, place, and time.   Psychiatric:         Mood and Affect: Mood normal.         "

## 2024-09-15 ENCOUNTER — NURSE TRIAGE (OUTPATIENT)
Dept: OTHER | Facility: OTHER | Age: 59
End: 2024-09-15

## 2024-09-15 DIAGNOSIS — J40 BRONCHITIS: Primary | ICD-10-CM

## 2024-09-15 RX ORDER — METHYLPREDNISOLONE 4 MG
TABLET, DOSE PACK ORAL
Qty: 21 TABLET | Refills: 0 | Status: SHIPPED | OUTPATIENT
Start: 2024-09-15

## 2024-09-15 NOTE — TELEPHONE ENCOUNTER
"Regarding: bronchitis / rx request  ----- Message from Maria Del Rosario VILLAFUERTE sent at 9/15/2024  9:13 AM EDT -----  \"I have been on antibiotics since last Thursday for bronchitis and Dr. Eldridge said if it doesn't get better to call and he would prescribe prednisone\"    "

## 2024-09-15 NOTE — TELEPHONE ENCOUNTER
Reason for Disposition  • [1] Sinus congestion as part of a cold AND [2] present < 10 days    Protocols used: Sinus Pain or Congestion-Adult-AH

## 2024-09-15 NOTE — TELEPHONE ENCOUNTER
"Answer Assessment - Initial Assessment Questions  1. LOCATION: \"Where does it hurt?\"       Sinus and chest congestion    2. ONSET: \"When did the sinus pain start?\"  (e.g., hours, days)       2.5 weeks ago    3. SEVERITY: \"How bad is the pain?\"   (Scale 1-10; mild, moderate or severe)      About the same since seen in office    4. RECURRENT SYMPTOM: \"Have you ever had sinus problems before?\" If Yes, ask: \"When was the last time?\" and \"What happened that time?\"       Has not had bronchitis in past    5. NASAL CONGESTION: \"Is the nose blocked?\" If Yes, ask: \"Can you open it or must you breathe through your mouth?\"      Denies    6. NASAL DISCHARGE: \"Do you have discharge from your nose?\" If so ask, \"What color?\"      Clear discharge    7. FEVER: \"Do you have a fever?\" If Yes, ask: \"What is it, how was it measured, and when did it start?\"       Denies    8. OTHER SYMPTOMS: \"Do you have any other symptoms?\" (e.g., sore throat, cough, earache, difficulty breathing)      \"Heaviness\" \"wheezing\" not getting any better    Protocols used: Sinus Pain or Congestion-Adult-      On call provider authorized medrol dose pack.  Patient advised script has been sent to pharmacy.  "

## 2024-09-22 DIAGNOSIS — E78.5 DYSLIPIDEMIA: ICD-10-CM

## 2024-09-25 RX ORDER — ROSUVASTATIN CALCIUM 10 MG/1
10 TABLET, COATED ORAL DAILY
Qty: 90 TABLET | Refills: 0 | Status: SHIPPED | OUTPATIENT
Start: 2024-09-25

## 2024-10-15 ENCOUNTER — TELEPHONE (OUTPATIENT)
Dept: CARDIOLOGY CLINIC | Facility: CLINIC | Age: 59
End: 2024-10-15

## 2024-10-16 ENCOUNTER — ANTICOAG VISIT (OUTPATIENT)
Dept: CARDIOLOGY CLINIC | Facility: CLINIC | Age: 59
End: 2024-10-16

## 2024-10-16 DIAGNOSIS — I35.9 AORTIC VALVE DISEASE: Primary | ICD-10-CM

## 2024-10-16 DIAGNOSIS — Z95.2 H/O AORTIC VALVE REPLACEMENT: ICD-10-CM

## 2024-10-16 LAB — INR PPP: 2.1 (ref 0.85–1.19)

## 2024-10-16 NOTE — PROGRESS NOTES
Left message for patient, advised INR at bottom of goal, advised will continue 5 mg Fri, 2.5 mg all other days, will recheck in 2 weeks 10/29/24  Advised to call with questions or concerns.    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

## 2024-11-04 ENCOUNTER — OFFICE VISIT (OUTPATIENT)
Dept: FAMILY MEDICINE CLINIC | Facility: CLINIC | Age: 59
End: 2024-11-04
Payer: OTHER GOVERNMENT

## 2024-11-04 VITALS
OXYGEN SATURATION: 97 % | WEIGHT: 172 LBS | SYSTOLIC BLOOD PRESSURE: 130 MMHG | HEART RATE: 77 BPM | BODY MASS INDEX: 29.37 KG/M2 | HEIGHT: 64 IN | DIASTOLIC BLOOD PRESSURE: 76 MMHG

## 2024-11-04 DIAGNOSIS — I35.9 AORTIC VALVE DISEASE: ICD-10-CM

## 2024-11-04 DIAGNOSIS — Z00.00 ANNUAL PHYSICAL EXAM: Primary | ICD-10-CM

## 2024-11-04 DIAGNOSIS — E55.9 VITAMIN D DEFICIENCY: ICD-10-CM

## 2024-11-04 DIAGNOSIS — R10.84 GENERALIZED ABDOMINAL PAIN: ICD-10-CM

## 2024-11-04 DIAGNOSIS — F41.9 ANXIETY: ICD-10-CM

## 2024-11-04 DIAGNOSIS — E78.5 HYPERLIPIDEMIA, UNSPECIFIED HYPERLIPIDEMIA TYPE: ICD-10-CM

## 2024-11-04 DIAGNOSIS — Z12.11 SCREEN FOR COLON CANCER: ICD-10-CM

## 2024-11-04 DIAGNOSIS — Z78.0 POSTMENOPAUSAL ESTROGEN DEFICIENCY: ICD-10-CM

## 2024-11-04 PROCEDURE — 99214 OFFICE O/P EST MOD 30 MIN: CPT | Performed by: PHYSICIAN ASSISTANT

## 2024-11-04 PROCEDURE — 99396 PREV VISIT EST AGE 40-64: CPT | Performed by: PHYSICIAN ASSISTANT

## 2024-11-04 NOTE — PATIENT INSTRUCTIONS
Assessment/plan:  1.  Annual physical exam-patient is due for complete labs.  Vaccines reviewed.  Patient is due for colon cancer screening.  Breast cancer screening was completed in September of this year and is up-to-date.  2.  Aortic valve disease-stable on warfarin therapy status post aortic valve replacement  3.  Hyperlipidemia-stable on Crestor 10 mg daily, no medication changes.  4.  Anxiety-presently on propranolol 3 times daily as needed's.  5.  Right upper quadrant abdominal pain-patient has been getting some pains in the right upper quadrant which does seem to be related to eating.  She has had history of cholecystectomy 3 years ago.  She does have some concern about her brother being diagnosed with pancreatic cancer recently and having some liver lesions.  Would recommend starting with ultrasound and labs to assess.

## 2024-11-04 NOTE — PROGRESS NOTES
Adult Annual Physical  Name: Maddie Rockwell      : 1965      MRN: [id number]  Encounter Provider: Walter Eldridge PA-C  Encounter Date: 2024   Encounter department: [facility] PRIMARY CARE  Patient Instructions       Assessment/plan:  1.  Annual physical exam-patient is due for complete labs.  Vaccines reviewed.  Patient is due for colon cancer screening.  Breast cancer screening was completed in September of this year and is up-to-date.  2.  Aortic valve disease-stable on warfarin therapy status post aortic valve replacement  3.  Hyperlipidemia-stable on Crestor 10 mg daily, no medication changes.  4.  Anxiety-presently on propranolol 3 times daily as needed's.  5.  Right upper quadrant abdominal pain-patient has been getting some pains in the right upper quadrant which does seem to be related to eating.  She has had history of cholecystectomy 3 years ago.  She does have some concern about her brother being diagnosed with pancreatic cancer recently and having some liver lesions.  Would recommend starting with ultrasound and labs to assess.    Assessment & Plan  Annual physical exam         Aortic valve disease         Hyperlipidemia, unspecified hyperlipidemia type    Orders:    CBC and differential    Comprehensive metabolic panel    Lipid Panel with Direct LDL reflex    TSH, 3rd generation with Free T4 reflex    Anxiety    Orders:    CBC and differential    Comprehensive metabolic panel    Lipid Panel with Direct LDL reflex    TSH, 3rd generation with Free T4 reflex    Vitamin D deficiency    Orders:    CBC and differential    Comprehensive metabolic panel    Lipid Panel with Direct LDL reflex    TSH, 3rd generation with Free T4 reflex    Vitamin D 25 hydroxy    Screen for colon cancer    Orders:    Cologuard    Postmenopausal estrogen deficiency    Orders:    DXA bone density spine hip and pelvis; Future    Generalized abdominal pain    Orders:    US abdomen complete;  Future    Immunizations and preventive care screenings were discussed with patient today. Appropriate education was printed on patient's after visit summary.    Counseling:  Dental Health: discussed importance of regular tooth brushing, flossing, and dental visits.  Exercise: the importance of regular exercise/physical activity was discussed. Recommend exercise 3-5 times per week for at least 30 minutes.       Depression Screening and Follow-up Plan: Patient was screened for depression during today's encounter. They screened negative with a PHQ-2 score of 0.        History of Present Illness     Adult Annual Physical:  Patient presents for annual physical. HPI: This is a 58-year-old female who presents to the office for annual physical exam.  She does have history of aortic valve disease which has been stable status post replacement on warfarin therapy.  She also continues Crestor 10 mg daily for history of hyperlipidemia.  She also has history of anxiety and continues propranolol for this.  Patient states that she is interested in getting complete labs and some further testing completed as she does have concerned about her brother recently being diagnosed with pancreatic cancer.  She has been getting some pain in her right upper quadrant for quite some time and feels that it is related to eating.  It does seem to be worse after eating.  Symptoms seem to come and go and are not always present.  She has had history of cholecystectomy about 3 years ago.  She states that symptoms are very similar to the when she had the gallbladder attacks previously.  She also has history of aortic valve replacement and continues on warfarin therapy.  She is currently on statin for her cholesterol and has not had any recent liver enzymes completed..     Diet and Physical Activity:  - Diet/Nutrition: well balanced diet.  - Exercise: walking.    Depression Screening:  - PHQ-2 Score: 0    General Health:  - Sleep: sleeps well.  -  "Hearing: normal hearing bilateral ears.  - Vision: no vision problems.  - Dental: regular dental visits.    /GYN Health:  - Follows with GYN: yes.     Advanced Care Planning:  - Has an advanced directive?: no    - Has a durable medical POA?: no    - ACP document given to patient?: no      Review of Systems   Constitutional:  Negative for chills and fever.   HENT:  Negative for congestion.    Respiratory:  Negative for chest tightness and shortness of breath.    Cardiovascular:  Negative for chest pain and palpitations.   Gastrointestinal:  Negative for abdominal pain, diarrhea and vomiting.   Skin:  Negative for rash.   Neurological:  Negative for dizziness.         Objective     /76 (BP Location: Left arm, Patient Position: Sitting, Cuff Size: Standard)   Pulse 77   Ht 5' 4\" (1.626 m)   Wt 78 kg (172 lb)   SpO2 97%   BMI 29.52 kg/m²     Physical Exam  Constitutional:       General: She is not in acute distress.     Appearance: Normal appearance.   HENT:      Head: Normocephalic and atraumatic.      Right Ear: Tympanic membrane normal.      Left Ear: Tympanic membrane normal.      Nose: No congestion or rhinorrhea.   Eyes:      Conjunctiva/sclera: Conjunctivae normal.      Pupils: Pupils are equal, round, and reactive to light.   Neck:      Vascular: No carotid bruit.   Cardiovascular:      Rate and Rhythm: Normal rate and regular rhythm.      Heart sounds: No murmur heard.  Pulmonary:      Effort: Pulmonary effort is normal. No respiratory distress.      Breath sounds: Normal breath sounds.   Abdominal:      Palpations: Abdomen is soft.   Musculoskeletal:         General: Normal range of motion.      Cervical back: Normal range of motion and neck supple. No muscular tenderness.   Lymphadenopathy:      Cervical: No cervical adenopathy.   Skin:     General: Skin is warm.      Capillary Refill: Capillary refill takes less than 2 seconds.   Neurological:      General: No focal deficit present.      Mental " Status: She is alert and oriented to person, place, and time.   Psychiatric:         Mood and Affect: Mood normal.

## 2024-11-04 NOTE — ASSESSMENT & PLAN NOTE
Orders:    CBC and differential    Comprehensive metabolic panel    Lipid Panel with Direct LDL reflex    TSH, 3rd generation with Free T4 reflex

## 2024-11-06 ENCOUNTER — ANTICOAG VISIT (OUTPATIENT)
Dept: CARDIOLOGY CLINIC | Facility: CLINIC | Age: 59
End: 2024-11-06

## 2024-11-06 DIAGNOSIS — Z95.2 H/O AORTIC VALVE REPLACEMENT: ICD-10-CM

## 2024-11-06 DIAGNOSIS — I35.9 AORTIC VALVE DISEASE: Primary | ICD-10-CM

## 2024-11-06 LAB — INR PPP: 2 (ref 0.85–1.19)

## 2024-11-06 NOTE — PROGRESS NOTES
Spoke with patient, advised INR is still at bottom of goal. When verifying dose, states she has been taking only 2.5 mg daily for months.   Advised to take 5 mg tonight only, then go back to 2.5 mg daily and will recheck in 3 weeks 11/27/24  Patient is a home xavier

## 2024-11-18 ENCOUNTER — HOSPITAL ENCOUNTER (OUTPATIENT)
Dept: ULTRASOUND IMAGING | Facility: MEDICAL CENTER | Age: 59
Discharge: HOME | End: 2024-11-18
Payer: OTHER GOVERNMENT

## 2024-11-18 DIAGNOSIS — R10.84 GENERALIZED ABDOMINAL PAIN: ICD-10-CM

## 2024-11-18 PROCEDURE — 76700 US EXAM ABDOM COMPLETE: CPT

## 2024-11-19 ENCOUNTER — HOSPITAL ENCOUNTER (OUTPATIENT)
Dept: BONE DENSITY | Facility: CLINIC | Age: 59
Discharge: HOME | End: 2024-11-19
Payer: OTHER GOVERNMENT

## 2024-11-19 VITALS — BODY MASS INDEX: 29.37 KG/M2 | WEIGHT: 172 LBS | HEIGHT: 64 IN

## 2024-11-19 DIAGNOSIS — Z78.0 POSTMENOPAUSAL ESTROGEN DEFICIENCY: ICD-10-CM

## 2024-11-19 PROCEDURE — 77080 DXA BONE DENSITY AXIAL: CPT

## 2024-11-20 ENCOUNTER — RESULTS FOLLOW-UP (OUTPATIENT)
Dept: FAMILY MEDICINE CLINIC | Facility: CLINIC | Age: 59
End: 2024-11-20

## 2024-11-20 LAB
25(OH)D3 SERPL-MCNC: 25 NG/ML (ref 30–100)
ALBUMIN SERPL-MCNC: 4.5 G/DL (ref 3.6–5.1)
ALBUMIN/GLOB SERPL: 1.9 (CALC) (ref 1–2.5)
ALP SERPL-CCNC: 43 U/L (ref 37–153)
ALT SERPL-CCNC: 32 U/L (ref 6–29)
AST SERPL-CCNC: 25 U/L (ref 10–35)
BASOPHILS # BLD AUTO: 39 CELLS/UL (ref 0–200)
BASOPHILS NFR BLD AUTO: 1 %
BILIRUB SERPL-MCNC: 2.4 MG/DL (ref 0.2–1.2)
BUN SERPL-MCNC: 9 MG/DL (ref 7–25)
BUN/CREAT SERPL: ABNORMAL (CALC) (ref 6–22)
CALCIUM SERPL-MCNC: 9.4 MG/DL (ref 8.6–10.4)
CHLORIDE SERPL-SCNC: 103 MMOL/L (ref 98–110)
CHOLEST SERPL-MCNC: 161 MG/DL
CHOLEST/HDLC SERPL: 2.6 (CALC)
CO2 SERPL-SCNC: 30 MMOL/L (ref 20–32)
CREAT SERPL-MCNC: 0.67 MG/DL (ref 0.5–1.05)
EOSINOPHIL # BLD AUTO: 218 CELLS/UL (ref 15–500)
EOSINOPHIL NFR BLD AUTO: 5.6 %
ERYTHROCYTE [DISTWIDTH] IN BLOOD BY AUTOMATED COUNT: 12.1 % (ref 11–15)
GFR/BSA.PRED SERPLBLD CYS-BASED-ARV: 100 ML/MIN/1.73M2
GLOBULIN SER CALC-MCNC: 2.4 G/DL (CALC) (ref 1.9–3.7)
GLUCOSE SERPL-MCNC: 100 MG/DL (ref 65–99)
HCT VFR BLD AUTO: 38.6 % (ref 35–45)
HDLC SERPL-MCNC: 63 MG/DL
HGB BLD-MCNC: 12.8 G/DL (ref 11.7–15.5)
LDLC SERPL CALC-MCNC: 67 MG/DL (CALC)
LYMPHOCYTES # BLD AUTO: 1049 CELLS/UL (ref 850–3900)
LYMPHOCYTES NFR BLD AUTO: 26.9 %
MCH RBC QN AUTO: 31.7 PG (ref 27–33)
MCHC RBC AUTO-ENTMCNC: 33.2 G/DL (ref 32–36)
MCV RBC AUTO: 95.5 FL (ref 80–100)
MONOCYTES # BLD AUTO: 460 CELLS/UL (ref 200–950)
MONOCYTES NFR BLD AUTO: 11.8 %
NEUTROPHILS # BLD AUTO: 2133 CELLS/UL (ref 1500–7800)
NEUTROPHILS NFR BLD AUTO: 54.7 %
NONHDLC SERPL-MCNC: 98 MG/DL (CALC)
PLATELET # BLD AUTO: 226 THOUSAND/UL (ref 140–400)
PMV BLD REES-ECKER: 9.9 FL (ref 7.5–12.5)
POTASSIUM SERPL-SCNC: 4.1 MMOL/L (ref 3.5–5.3)
PROT SERPL-MCNC: 6.9 G/DL (ref 6.1–8.1)
RBC # BLD AUTO: 4.04 MILLION/UL (ref 3.8–5.1)
SODIUM SERPL-SCNC: 140 MMOL/L (ref 135–146)
TRIGL SERPL-MCNC: 245 MG/DL
TSH SERPL-ACNC: 2.65 MIU/L (ref 0.4–4.5)
WBC # BLD AUTO: 3.9 THOUSAND/UL (ref 3.8–10.8)

## 2024-11-21 ENCOUNTER — RESULTS FOLLOW-UP (OUTPATIENT)
Dept: FAMILY MEDICINE CLINIC | Facility: CLINIC | Age: 59
End: 2024-11-21

## 2024-12-10 ENCOUNTER — ANTICOAG VISIT (OUTPATIENT)
Dept: CARDIOLOGY CLINIC | Facility: CLINIC | Age: 59
End: 2024-12-10

## 2024-12-10 DIAGNOSIS — Z95.2 S/P AVR (AORTIC VALVE REPLACEMENT): Primary | ICD-10-CM

## 2024-12-10 DIAGNOSIS — Z95.2 H/O AORTIC VALVE REPLACEMENT: ICD-10-CM

## 2024-12-10 DIAGNOSIS — I35.9 AORTIC VALVE DISEASE: Primary | ICD-10-CM

## 2024-12-10 LAB — INR PPP: 1.9 (ref 0.85–1.19)

## 2024-12-10 RX ORDER — WARFARIN SODIUM 2 MG/1
TABLET ORAL
Qty: 60 TABLET | Refills: 1 | Status: SHIPPED | OUTPATIENT
Start: 2024-12-10

## 2024-12-10 NOTE — PROGRESS NOTES
Spoke with patient, advised INR a little low, states she took 5 mg last night because it was low.   Advised I can send in script for 2 mg tablets and have her take 4 mg Mon Fri, 2 mg all other days. Patient willing to try. Advised we can check in 3 weeks 12/30/24  Script sent to [facility].     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

## 2024-12-22 DIAGNOSIS — E78.5 DYSLIPIDEMIA: ICD-10-CM

## 2024-12-23 RX ORDER — ROSUVASTATIN CALCIUM 10 MG/1
10 TABLET, COATED ORAL DAILY
Qty: 90 TABLET | Refills: 1 | Status: SHIPPED | OUTPATIENT
Start: 2024-12-23

## 2025-01-07 ENCOUNTER — ANTICOAG VISIT (OUTPATIENT)
Dept: CARDIOLOGY CLINIC | Facility: CLINIC | Age: 60
End: 2025-01-07
Payer: OTHER GOVERNMENT

## 2025-01-07 DIAGNOSIS — Z95.2 H/O AORTIC VALVE REPLACEMENT: ICD-10-CM

## 2025-01-07 DIAGNOSIS — I35.9 AORTIC VALVE DISEASE: Primary | ICD-10-CM

## 2025-01-07 LAB — INR PPP: 2.2 (ref 0.85–1.19)

## 2025-01-07 PROCEDURE — 93793 ANTICOAG MGMT PT WARFARIN: CPT

## 2025-01-07 NOTE — PROGRESS NOTES
Left message for patient, advised INR good, continue 4 mg Mon Fri, 2 mg all other days, will recheck in 3 weeks 1/28/25  Advised to call with questions or concerns.    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

## 2025-01-14 ENCOUNTER — OFFICE VISIT (OUTPATIENT)
Dept: FAMILY MEDICINE CLINIC | Facility: CLINIC | Age: 60
End: 2025-01-14

## 2025-01-14 VITALS
SYSTOLIC BLOOD PRESSURE: 168 MMHG | DIASTOLIC BLOOD PRESSURE: 72 MMHG | OXYGEN SATURATION: 100 % | BODY MASS INDEX: 29.02 KG/M2 | HEART RATE: 80 BPM | HEIGHT: 64 IN | WEIGHT: 170 LBS

## 2025-01-14 DIAGNOSIS — Z95.2 H/O AORTIC VALVE REPLACEMENT: ICD-10-CM

## 2025-01-14 DIAGNOSIS — R00.2 PALPITATIONS: ICD-10-CM

## 2025-01-14 DIAGNOSIS — I49.3 PVCS (PREMATURE VENTRICULAR CONTRACTIONS): ICD-10-CM

## 2025-01-14 DIAGNOSIS — E78.5 HYPERLIPIDEMIA, UNSPECIFIED HYPERLIPIDEMIA TYPE: ICD-10-CM

## 2025-01-14 DIAGNOSIS — F41.9 ANXIETY: Primary | ICD-10-CM

## 2025-01-14 PROCEDURE — 99214 OFFICE O/P EST MOD 30 MIN: CPT | Performed by: PHYSICIAN ASSISTANT

## 2025-01-14 RX ORDER — ESCITALOPRAM OXALATE 5 MG/1
5 TABLET ORAL DAILY
Qty: 30 TABLET | Refills: 5 | Status: SHIPPED | OUTPATIENT
Start: 2025-01-14

## 2025-01-14 RX ORDER — ALPRAZOLAM 0.25 MG/1
0.25 TABLET ORAL SEE ADMIN INSTRUCTIONS
Qty: 30 TABLET | Refills: 1 | Status: SHIPPED | OUTPATIENT
Start: 2025-01-14

## 2025-01-14 NOTE — PROGRESS NOTES
Name: Maddie Rockwell      : 1965      MRN: [id number]  Encounter Provider: Walter Eldridge PA-C  Encounter Date: 2025   Encounter department: [facility] PRIMARY CARE  :  Assessment & Plan  Anxiety  Treatment options were reviewed with patient in detail.  She is having significant daily symptoms and recent stressors after the passing of her brother.  Would recommend starting 5 mg of Lexapro daily.  Possible side effects of medication were reviewed with patient.  She last had a prescription of Xanax from cardiology 2-1/2 years ago.  She will be given refill of Xanax 0.25 mg, half tablet twice daily as needed for anxiety symptoms.  Controlled substance contract was signed by patient.  Orders:  •  escitalopram (LEXAPRO) 5 mg tablet; Take 1 tablet (5 mg total) by mouth daily    H/O aortic valve replacement  Patient is presently stable on warfarin therapy, no medication changes.       Hyperlipidemia, unspecified hyperlipidemia type  Patient is stable on rosuvastatin 10 mg daily, no medication changes.       Palpitations  Patient was previously on propranolol as necessary.  Reports not currently taking.       PVCs (premature ventricular contractions)  Patient may also continue propranolol for tachycardia symptoms of waking in the middle of the night.  Orders:  •  ALPRAZolam (XANAX) 0.25 mg tablet; Take 1 tablet (0.25 mg total) by mouth see administration instructions 1/2 tab twice daily as needed for anxiety           History of Present Illness     HPI: This is a 59-year-old female who presents to the office with concerns of her increased anxiety symptoms.  Her brother recently passed from pancreatic cancer and she seems to be having increased stressors in her life.  She has been having waves of anxiety every day, throughout the day.  She sometimes wakes in the middle of the night with tachycardic symptoms.  She has not been using propranolol which she was previously given because she feels it  "makes her feel very heavy and sluggish after using it.  About 2-1/2 years ago she was given a prescription for Xanax by cardiology and she has been using 1/2 tablet when necessary with some relief.  She feels that the symptoms are very frequent however and does not want to continue relying on this alone.  She does have a history of aortic valve replacement and continues to be on long-term anticoagulation with warfarin.    Anxiety  Symptoms include decreased concentration. Patient reports no chest pain, dizziness, nausea, palpitations or shortness of breath.         Review of Systems   Constitutional:  Negative for chills, fatigue and fever.   HENT:  Negative for congestion, ear pain and sinus pressure.    Eyes:  Negative for visual disturbance.   Respiratory:  Negative for cough, chest tightness and shortness of breath.    Cardiovascular:  Negative for chest pain and palpitations.   Gastrointestinal:  Negative for diarrhea, nausea and vomiting.   Endocrine: Negative for polyuria.   Genitourinary:  Negative for dysuria and frequency.   Musculoskeletal:  Negative for arthralgias and myalgias.   Skin:  Negative for pallor and rash.   Neurological:  Negative for dizziness, weakness, light-headedness, numbness and headaches.   Psychiatric/Behavioral:  Positive for agitation, decreased concentration and dysphoric mood. Negative for behavioral problems and sleep disturbance.    All other systems reviewed and are negative.      Objective   /72 (BP Location: Left arm, Patient Position: Sitting, Cuff Size: Standard)   Pulse 80   Ht 5' 4\" (1.626 m)   Wt 77.1 kg (170 lb)   SpO2 100%   BMI 29.18 kg/m²      Physical Exam  Constitutional:       General: She is not in acute distress.     Appearance: She is well-developed.   HENT:      Head: Normocephalic and atraumatic.   Eyes:      Conjunctiva/sclera: Conjunctivae normal.      Pupils: Pupils are equal, round, and reactive to light.   Cardiovascular:      Rate and " Rhythm: Normal rate.      Heart sounds: Normal heart sounds. No murmur heard.  Pulmonary:      Effort: Pulmonary effort is normal. No respiratory distress.      Breath sounds: No wheezing.

## 2025-01-14 NOTE — ASSESSMENT & PLAN NOTE
Treatment options were reviewed with patient in detail.  She is having significant daily symptoms and recent stressors after the passing of her brother.  Would recommend starting 5 mg of Lexapro daily.  Possible side effects of medication were reviewed with patient.  She last had a prescription of Xanax from cardiology 2-1/2 years ago.  She will be given refill of Xanax 0.25 mg, half tablet twice daily as needed for anxiety symptoms.  Controlled substance contract was signed by patient.  Orders:  •  escitalopram (LEXAPRO) 5 mg tablet; Take 1 tablet (5 mg total) by mouth daily

## 2025-01-14 NOTE — ASSESSMENT & PLAN NOTE
Patient may also continue propranolol for tachycardia symptoms of waking in the middle of the night.  Orders:  •  ALPRAZolam (XANAX) 0.25 mg tablet; Take 1 tablet (0.25 mg total) by mouth see administration instructions 1/2 tab twice daily as needed for anxiety

## 2025-01-23 ENCOUNTER — TELEPHONE (OUTPATIENT)
Dept: FAMILY MEDICINE CLINIC | Facility: CLINIC | Age: 60
End: 2025-01-23

## 2025-02-04 ENCOUNTER — TELEPHONE (OUTPATIENT)
Dept: CARDIOLOGY CLINIC | Facility: CLINIC | Age: 60
End: 2025-02-04

## 2025-02-05 ENCOUNTER — ANTICOAG VISIT (OUTPATIENT)
Dept: CARDIOLOGY CLINIC | Facility: CLINIC | Age: 60
End: 2025-02-05
Payer: OTHER GOVERNMENT

## 2025-02-05 DIAGNOSIS — Z95.2 H/O AORTIC VALVE REPLACEMENT: ICD-10-CM

## 2025-02-05 DIAGNOSIS — I35.9 AORTIC VALVE DISEASE: Primary | ICD-10-CM

## 2025-02-05 LAB — INR PPP: 1.9 (ref 0.85–1.19)

## 2025-02-05 PROCEDURE — 93793 ANTICOAG MGMT PT WARFARIN: CPT

## 2025-02-05 NOTE — PROGRESS NOTES
Left message for patient, advised INR a little low, advised I have her taking 4 mg Mon Fri, 2 mg all other days. Advised to take 4 mg Mon Wed Fri, 2 mg all other days, to call if not taking as above or changes in medications, diet or health.   Will recheck in 2 weeks 2/18/25    Patient is a home xavier

## 2025-02-19 ENCOUNTER — ANTICOAG VISIT (OUTPATIENT)
Dept: CARDIOLOGY CLINIC | Facility: CLINIC | Age: 60
End: 2025-02-19

## 2025-02-19 DIAGNOSIS — Z95.2 H/O AORTIC VALVE REPLACEMENT: ICD-10-CM

## 2025-02-19 DIAGNOSIS — I35.9 AORTIC VALVE DISEASE: Primary | ICD-10-CM

## 2025-02-19 LAB — INR PPP: 2.5 (ref 0.85–1.19)

## 2025-02-19 NOTE — PROGRESS NOTES
Left message, advised INR good, continue 4 mg Mon Wed Fri, 2 mg all other days, will recheck in 2 weeks 3/5/25  Advised to call with questions or concerns.    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

## 2025-03-15 DIAGNOSIS — Z95.2 S/P AVR (AORTIC VALVE REPLACEMENT): ICD-10-CM

## 2025-03-17 ENCOUNTER — TELEPHONE (OUTPATIENT)
Dept: CARDIOLOGY CLINIC | Facility: CLINIC | Age: 60
End: 2025-03-17

## 2025-03-17 RX ORDER — WARFARIN SODIUM 2 MG/1
TABLET ORAL
Qty: 60 TABLET | Refills: 2 | Status: SHIPPED | OUTPATIENT
Start: 2025-03-17

## 2025-03-17 NOTE — TELEPHONE ENCOUNTER
LM for pt for f/u 1 year-5/8 at 220 will check tomorrow ---- Message from Aurora MARQUES sent at 3/17/2025  2:21 PM EDT -----  Regarding: needs follow up  Good afternoon,  Patient is on wait list for appointment with Dr Pink, due in April 2025  Can you please call patient to schedule?  Thank you!  Aurora

## 2025-03-19 ENCOUNTER — ANTICOAG VISIT (OUTPATIENT)
Dept: CARDIOLOGY CLINIC | Facility: CLINIC | Age: 60
End: 2025-03-19
Payer: OTHER GOVERNMENT

## 2025-03-19 DIAGNOSIS — I35.9 AORTIC VALVE DISEASE: Primary | ICD-10-CM

## 2025-03-19 DIAGNOSIS — Z95.2 H/O AORTIC VALVE REPLACEMENT: ICD-10-CM

## 2025-03-19 LAB — INR PPP: 2.4 (ref 0.85–1.19)

## 2025-03-19 PROCEDURE — 93793 ANTICOAG MGMT PT WARFARIN: CPT

## 2025-03-19 NOTE — PROGRESS NOTES
PC to patient with good INR of 2.4. Left message and advised same dosing of warfarin at 4 mgs M/W/F and 2 mgs all other days of the week and retest in 2 weeks.

## 2025-04-16 ENCOUNTER — TELEPHONE (OUTPATIENT)
Dept: CARDIOLOGY CLINIC | Facility: CLINIC | Age: 60
End: 2025-04-16

## 2025-04-17 ENCOUNTER — ANTICOAG VISIT (OUTPATIENT)
Dept: CARDIOLOGY CLINIC | Facility: CLINIC | Age: 60
End: 2025-04-17
Payer: OTHER GOVERNMENT

## 2025-04-17 DIAGNOSIS — I35.9 AORTIC VALVE DISEASE: Primary | ICD-10-CM

## 2025-04-17 DIAGNOSIS — Z95.2 H/O AORTIC VALVE REPLACEMENT: ICD-10-CM

## 2025-04-17 LAB — INR PPP: 2.4 (ref 0.85–1.19)

## 2025-04-17 PROCEDURE — 93793 ANTICOAG MGMT PT WARFARIN: CPT

## 2025-04-17 NOTE — PROGRESS NOTES
Left message for patient, advised INR good, continue 4 mg Mon Wed Fri, 2 mg all other days, will recheck in 2 weeks 4/30/25  Advised to call with questions or concerns.    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

## 2025-05-14 ENCOUNTER — ANTICOAG VISIT (OUTPATIENT)
Dept: CARDIOLOGY CLINIC | Facility: CLINIC | Age: 60
End: 2025-05-14

## 2025-05-14 DIAGNOSIS — I35.9 AORTIC VALVE DISEASE: Primary | ICD-10-CM

## 2025-05-14 DIAGNOSIS — Z95.2 H/O AORTIC VALVE REPLACEMENT: ICD-10-CM

## 2025-05-14 LAB — INR PPP: 3 (ref 0.85–1.19)

## 2025-05-14 NOTE — PROGRESS NOTES
Spoke with patient, advised INR at top of goal, no changes in medications or diet. Current dose verified.  Advised to take 2 mg tonight only, then go back to 4 mg Mon Wed Fri, 2 mg all other days, will recheck in 3 weeks 6/4/25    Patient is a home xavier

## 2025-05-22 ENCOUNTER — OFFICE VISIT (OUTPATIENT)
Dept: FAMILY MEDICINE CLINIC | Facility: CLINIC | Age: 60
End: 2025-05-22
Payer: OTHER GOVERNMENT

## 2025-05-22 VITALS
HEIGHT: 64 IN | TEMPERATURE: 98.8 F | DIASTOLIC BLOOD PRESSURE: 68 MMHG | HEART RATE: 73 BPM | OXYGEN SATURATION: 99 % | RESPIRATION RATE: 18 BRPM | BODY MASS INDEX: 27.83 KG/M2 | SYSTOLIC BLOOD PRESSURE: 134 MMHG | WEIGHT: 163 LBS

## 2025-05-22 DIAGNOSIS — E78.5 HYPERLIPIDEMIA, UNSPECIFIED HYPERLIPIDEMIA TYPE: ICD-10-CM

## 2025-05-22 DIAGNOSIS — F41.9 ANXIETY: Primary | ICD-10-CM

## 2025-05-22 DIAGNOSIS — Z95.2 H/O AORTIC VALVE REPLACEMENT: ICD-10-CM

## 2025-05-22 DIAGNOSIS — L72.9 SKIN CYSTS, GENERALIZED: ICD-10-CM

## 2025-05-22 PROCEDURE — 99214 OFFICE O/P EST MOD 30 MIN: CPT | Performed by: PHYSICIAN ASSISTANT

## 2025-05-22 RX ORDER — ESCITALOPRAM OXALATE 5 MG/1
5 TABLET ORAL DAILY
Qty: 30 TABLET | Refills: 5 | Status: SHIPPED | OUTPATIENT
Start: 2025-05-22

## 2025-05-22 NOTE — PROGRESS NOTES
Name: Maddie Rockwell      : 1965      MRN: [id number]  Encounter Provider: Walter Eldridge PA-C  Encounter Date: 2025   Encounter department: [facility] PRIMARY CARE  :  Assessment & Plan  Anxiety  Patient continues Lexapro 5 mg daily, stable.  Orders:  •  escitalopram (LEXAPRO) 5 mg tablet; Take 1 tablet (5 mg total) by mouth daily    Hyperlipidemia, unspecified hyperlipidemia type  Stable with rosuvastatin 10 mg daily.       H/O aortic valve replacement  Patient is stable, continues to follow with Dr. Pink, cardiology.  Currently on warfarin therapy.       Skin cysts, generalized  Patient with some cysts around the eyelid.  Etiology unclear.  Would recommend evaluation by Dr. Blackburn.  Possibly related to history of high cholesterol and triglycerides.  Currently that is doing better on statin therapy however.  Orders:  •  Ambulatory Referral to Ophthalmology; Future           History of Present Illness   HPI: This is a 59-year-old female who presents to the office for follow-up of anxiety and refill of medication.  She has been on Lexapro 5 mg daily and does feel that the medication has been helpful.  She is happy to continue with the medication at the current dosage.  She denies any side effects of the medication.  She also continues on rosuvastatin for her cholesterol.  She has started to get some flesh-colored lumps around the eyes.  She has concerned about what these could be.  She has noticed about 3 of them pop up in the past several months.  They do not seem to be red or draining any fluid.  They are nontender.  She has also had a history of aortic valve replacement and continues to follow with cardiology.  She is currently on anticoagulation with warfarin therapy.        Review of Systems   Constitutional:  Negative for chills, fatigue and fever.   HENT:  Negative for congestion, ear pain and sinus pressure.    Eyes:  Negative for visual disturbance.   Respiratory:  Negative for  "cough, chest tightness and shortness of breath.    Cardiovascular:  Negative for chest pain and palpitations.   Gastrointestinal:  Negative for diarrhea, nausea and vomiting.   Endocrine: Negative for polyuria.   Genitourinary:  Negative for dysuria and frequency.   Musculoskeletal:  Negative for arthralgias and myalgias.   Skin:  Negative for pallor and rash.   Neurological:  Negative for dizziness, weakness, light-headedness, numbness and headaches.   Psychiatric/Behavioral:  Negative for agitation, behavioral problems and sleep disturbance.    All other systems reviewed and are negative.      Objective   /68 (BP Location: Left arm, Patient Position: Sitting, Cuff Size: Adult)   Pulse 73   Temp 98.8 °F (37.1 °C) (Tympanic)   Resp 18   Ht 5' 4\" (1.626 m)   Wt 73.9 kg (163 lb)   SpO2 99%   BMI 27.98 kg/m²      Physical Exam  Constitutional:       General: She is not in acute distress.     Appearance: Normal appearance.   HENT:      Head: Normocephalic and atraumatic.      Right Ear: Tympanic membrane normal.      Left Ear: Tympanic membrane normal.      Nose: No congestion or rhinorrhea.     Eyes:      Conjunctiva/sclera: Conjunctivae normal.      Pupils: Pupils are equal, round, and reactive to light.      Comments: Patient does have 2 or 3 flesh-colored cystlike lesions which are approximately 2 mm to 3 mm in size around the upper eyelid and lateral right eyelid.  These are nontender.  There is no purulence or erythema.   Neck:      Vascular: No carotid bruit.     Cardiovascular:      Rate and Rhythm: Normal rate and regular rhythm.      Heart sounds: No murmur heard.  Pulmonary:      Effort: Pulmonary effort is normal. No respiratory distress.      Breath sounds: Normal breath sounds.   Abdominal:      Palpations: Abdomen is soft.     Musculoskeletal:         General: Normal range of motion.      Cervical back: Normal range of motion and neck supple. No muscular tenderness.   Lymphadenopathy:      " Cervical: No cervical adenopathy.     Skin:     General: Skin is warm.      Capillary Refill: Capillary refill takes less than 2 seconds.     Neurological:      General: No focal deficit present.      Mental Status: She is alert and oriented to person, place, and time.     Psychiatric:         Mood and Affect: Mood normal.

## 2025-05-22 NOTE — ASSESSMENT & PLAN NOTE
Patient is stable, continues to follow with Dr. Pink, cardiology.  Currently on warfarin therapy.

## 2025-05-22 NOTE — ASSESSMENT & PLAN NOTE
Patient continues Lexapro 5 mg daily, stable.  Orders:  •  escitalopram (LEXAPRO) 5 mg tablet; Take 1 tablet (5 mg total) by mouth daily

## 2025-06-12 ENCOUNTER — ANTICOAG VISIT (OUTPATIENT)
Dept: CARDIOLOGY CLINIC | Facility: CLINIC | Age: 60
End: 2025-06-12

## 2025-06-12 DIAGNOSIS — I35.9 AORTIC VALVE DISEASE: Primary | ICD-10-CM

## 2025-06-12 DIAGNOSIS — Z95.2 H/O AORTIC VALVE REPLACEMENT: ICD-10-CM

## 2025-06-12 LAB — INR PPP: 2.8 (ref 0.85–1.19)

## 2025-06-12 NOTE — PROGRESS NOTES
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

## 2025-06-25 ENCOUNTER — OFFICE VISIT (OUTPATIENT)
Dept: FAMILY MEDICINE CLINIC | Facility: CLINIC | Age: 60
End: 2025-06-25
Payer: OTHER GOVERNMENT

## 2025-06-25 VITALS
SYSTOLIC BLOOD PRESSURE: 130 MMHG | HEART RATE: 74 BPM | BODY MASS INDEX: 27.98 KG/M2 | TEMPERATURE: 97.6 F | OXYGEN SATURATION: 98 % | WEIGHT: 163.9 LBS | DIASTOLIC BLOOD PRESSURE: 76 MMHG | HEIGHT: 64 IN

## 2025-06-25 DIAGNOSIS — E78.5 HYPERLIPIDEMIA, UNSPECIFIED HYPERLIPIDEMIA TYPE: ICD-10-CM

## 2025-06-25 DIAGNOSIS — Z12.11 SCREENING FOR COLON CANCER: ICD-10-CM

## 2025-06-25 DIAGNOSIS — R35.0 URINARY FREQUENCY: Primary | ICD-10-CM

## 2025-06-25 DIAGNOSIS — Z95.2 H/O AORTIC VALVE REPLACEMENT: ICD-10-CM

## 2025-06-25 DIAGNOSIS — Z12.31 ENCOUNTER FOR SCREENING MAMMOGRAM FOR BREAST CANCER: ICD-10-CM

## 2025-06-25 LAB
SL AMB  POCT GLUCOSE, UA: NEGATIVE
SL AMB LEUKOCYTE ESTERASE,UA: NEGATIVE
SL AMB POCT BILIRUBIN,UA: NEGATIVE
SL AMB POCT BLOOD,UA: ABNORMAL
SL AMB POCT CLARITY,UA: ABNORMAL
SL AMB POCT COLOR,UA: ABNORMAL
SL AMB POCT KETONES,UA: NEGATIVE
SL AMB POCT NITRITE,UA: NEGATIVE
SL AMB POCT PH,UA: 6
SL AMB POCT SPECIFIC GRAVITY,UA: 1.02
SL AMB POCT URINE PROTEIN: NEGATIVE
SL AMB POCT UROBILINOGEN: 1

## 2025-06-25 PROCEDURE — 99214 OFFICE O/P EST MOD 30 MIN: CPT | Performed by: PHYSICIAN ASSISTANT

## 2025-06-25 PROCEDURE — 87086 URINE CULTURE/COLONY COUNT: CPT | Performed by: PHYSICIAN ASSISTANT

## 2025-06-25 PROCEDURE — 81002 URINALYSIS NONAUTO W/O SCOPE: CPT | Performed by: PHYSICIAN ASSISTANT

## 2025-06-25 RX ORDER — CIPROFLOXACIN 500 MG/1
500 TABLET, FILM COATED ORAL EVERY 12 HOURS SCHEDULED
Qty: 14 TABLET | Refills: 0 | Status: SHIPPED | OUTPATIENT
Start: 2025-06-25 | End: 2025-07-02

## 2025-06-25 NOTE — PROGRESS NOTES
Name: Maddie Rockwell      : 1965      MRN: [id number]  Encounter Provider: Walter Eldridge PA-C  Encounter Date: 2025   Encounter department: [facility] PRIMARY [facility]  :  Assessment & Plan  Urinary frequency  Patient started with symptoms last week.  She was given Macrobid from a friend that works in urology.  She feels that the symptoms have improved only slightly and have not gotten worse but have not resolved either.  She is not having any severe flank pains or fevers present.  She does still have some urinary frequency and a generalized low abdominal pain.  Will start patient on Cipro 500 mg twice daily and send urinalysis for culture and sensitivity.  She was advised that the Cipro can interact with her warfarin and she is able to check her INR at home.  She will check over the weekend, within a few days of starting Cipro treatment to verify it is not going too high.  Orders:  •  Urine culture; Future  •  POCT urine dip  •  ciprofloxacin (CIPRO) 500 mg tablet; Take 1 tablet (500 mg total) by mouth every 12 (twelve) hours for 7 days    Encounter for screening mammogram for breast cancer  Mammogram ordered.  Orders:  •  Mammo screening bilateral w 3d and cad; Future    Screening for colon cancer    Orders:  •  Ambulatory Referral to Gastroenterology; Future    H/O aortic valve replacement  Patient is stable on warfarin therapy.  She does check her INR at home.  She will check over the weekend since she is on Cipro treatment.       Hyperlipidemia, unspecified hyperlipidemia type  Stable on rosuvastatin 10 mg daily, no medication changes.              History of Present Illness   HPI: This is a 59-year-old female who presents to the office with concerns over possible urinary tract infection.  She has had these in the past and symptoms have started similar with urinary frequency.  She states she was going every 15 or 20 minutes last week.  She was out of town and had a friend locally that  "worked in urology and gave her some Macrobid.  She has been using the Macrobid twice daily since then and feels the symptoms have not gotten any worse but they are not getting much better either.  She is not having any fevers, chills, or flank pain.  She does have a history of aortic valve replacement and continues on warfarin therapy.  She also has history of hyperlipidemia and continues on rosuvastatin.      Review of Systems   Constitutional:  Negative for chills, fatigue and fever.   HENT:  Negative for congestion, ear pain and sinus pressure.    Eyes:  Negative for visual disturbance.   Respiratory:  Negative for cough, chest tightness and shortness of breath.    Cardiovascular:  Negative for chest pain and palpitations.   Gastrointestinal:  Negative for diarrhea, nausea and vomiting.   Endocrine: Negative for polyuria.   Genitourinary:  Positive for dysuria and frequency.   Musculoskeletal:  Negative for arthralgias and myalgias.   Skin:  Negative for pallor and rash.   Neurological:  Negative for dizziness, weakness, light-headedness, numbness and headaches.   Psychiatric/Behavioral:  Negative for agitation, behavioral problems and sleep disturbance.    All other systems reviewed and are negative.      Objective   /76 (BP Location: Left arm, Patient Position: Sitting, Cuff Size: Standard)   Pulse 74   Temp 97.6 °F (36.4 °C) (Temporal)   Ht 5' 4\" (1.626 m)   Wt 74.3 kg (163 lb 14.4 oz)   SpO2 98%   BMI 28.13 kg/m²      Physical Exam  Constitutional:       General: She is not in acute distress.     Appearance: Normal appearance.   HENT:      Head: Normocephalic and atraumatic.      Right Ear: Tympanic membrane normal.      Left Ear: Tympanic membrane normal.      Nose: No congestion or rhinorrhea.     Eyes:      Conjunctiva/sclera: Conjunctivae normal.      Pupils: Pupils are equal, round, and reactive to light.     Neck:      Vascular: No carotid bruit.     Cardiovascular:      Rate and Rhythm: " Normal rate and regular rhythm.      Heart sounds: No murmur heard.  Pulmonary:      Effort: Pulmonary effort is normal. No respiratory distress.      Breath sounds: Normal breath sounds.   Abdominal:      Palpations: Abdomen is soft.      Tenderness: There is no abdominal tenderness. There is no right CVA tenderness or left CVA tenderness.     Musculoskeletal:         General: Normal range of motion.      Cervical back: Normal range of motion and neck supple. No muscular tenderness.   Lymphadenopathy:      Cervical: No cervical adenopathy.     Skin:     General: Skin is warm.      Capillary Refill: Capillary refill takes less than 2 seconds.     Neurological:      General: No focal deficit present.      Mental Status: She is alert and oriented to person, place, and time.     Psychiatric:         Mood and Affect: Mood normal.

## 2025-06-25 NOTE — ASSESSMENT & PLAN NOTE
Patient is stable on warfarin therapy.  She does check her INR at home.  She will check over the weekend since she is on Cipro treatment.

## 2025-06-26 LAB — BACTERIA UR CULT: NORMAL

## 2025-06-29 DIAGNOSIS — E78.5 DYSLIPIDEMIA: ICD-10-CM

## 2025-07-01 RX ORDER — ROSUVASTATIN CALCIUM 10 MG/1
10 TABLET, COATED ORAL DAILY
Qty: 90 TABLET | Refills: 3 | Status: SHIPPED | OUTPATIENT
Start: 2025-07-01

## 2025-07-02 LAB — INR PPP: 2 (ref 0.85–1.19)

## 2025-07-03 ENCOUNTER — ANTICOAG VISIT (OUTPATIENT)
Dept: CARDIOLOGY CLINIC | Facility: CLINIC | Age: 60
End: 2025-07-03
Payer: OTHER GOVERNMENT

## 2025-07-03 DIAGNOSIS — I35.9 AORTIC VALVE DISEASE: Primary | ICD-10-CM

## 2025-07-03 DIAGNOSIS — Z95.2 H/O AORTIC VALVE REPLACEMENT: ICD-10-CM

## 2025-07-03 PROCEDURE — 93793 ANTICOAG MGMT PT WARFARIN: CPT

## 2025-07-03 NOTE — PROGRESS NOTES
PC to [facility] with INR of 2.0.  Advised 4 mgs tonight instead of 2 mgs and then resume regular dosing of warfarin at 4 mgs every M/W/F and 2 all other days of the week and retest in 4 weeks.

## 2025-07-16 ENCOUNTER — OFFICE VISIT (OUTPATIENT)
Dept: CARDIOLOGY CLINIC | Facility: CLINIC | Age: 60
End: 2025-07-16
Payer: OTHER GOVERNMENT

## 2025-07-16 ENCOUNTER — TELEPHONE (OUTPATIENT)
Dept: ADMINISTRATIVE | Facility: HOSPITAL | Age: 60
End: 2025-07-16

## 2025-07-16 VITALS
WEIGHT: 161.4 LBS | BODY MASS INDEX: 27.55 KG/M2 | HEART RATE: 67 BPM | DIASTOLIC BLOOD PRESSURE: 66 MMHG | HEIGHT: 64 IN | SYSTOLIC BLOOD PRESSURE: 118 MMHG

## 2025-07-16 DIAGNOSIS — I49.3 PVCS (PREMATURE VENTRICULAR CONTRACTIONS): ICD-10-CM

## 2025-07-16 DIAGNOSIS — I35.9 AORTIC VALVE DISEASE: Primary | ICD-10-CM

## 2025-07-16 DIAGNOSIS — I47.29 NSVT (NONSUSTAINED VENTRICULAR TACHYCARDIA) (HCC): ICD-10-CM

## 2025-07-16 DIAGNOSIS — Z95.2 H/O AORTIC VALVE REPLACEMENT: ICD-10-CM

## 2025-07-16 PROCEDURE — 93000 ELECTROCARDIOGRAM COMPLETE: CPT | Performed by: INTERNAL MEDICINE

## 2025-07-16 PROCEDURE — 99214 OFFICE O/P EST MOD 30 MIN: CPT | Performed by: INTERNAL MEDICINE

## 2025-07-16 NOTE — TELEPHONE ENCOUNTER
Called pt left detailed message advised pt Dr Pink would like an echo before her next appt provided central scheduling # and mailed order out to pt

## 2025-07-16 NOTE — PROGRESS NOTES
Cardiology             Maddie SHELLI Moiz  1965  [id number]              Assessment/Plan:    Remote bioprosthetic aortic valve replacement in 1997 with subsequent mechanical aortic valve replacement 2008 at [facility]  Dyslipidemia  Chronic palpitations, possibly due to PVCs      No significant murmur on examination, normally functioning mechanical aortic valve on prior echocardiogram 6/17/2024.  Moderate mitral vegetation noted.  Will plan to repeat before next visit in 1 year  Patient states she has no palpitations as of late.  She remains off beta-blockers.  Continue antibiotics for endocarditis prophylaxis before dental work  Continue warfarin anticoagulation, goal INR 2-3 in the setting of mechanical aortic valve without other risk factors  Continue rosuvastatin for dyslipidemia        Follow-up in 1 year.  Will plan on repeat echocardiogram before next visit      Interval History:     This is a 58-year-old female with remote bioprosthetic aortic valve replacement 1997 with subsequent mechanical aortic valve replacement 2008 at [facility].  She also has dyslipidemia and history of palpitations due to PVCs.  For this she has been following with Dr. Camacho in the past.    In December 2022, she was changed from pravastatin to rosuvastatin for better lipid control.    Zio monitor 5/3/2024 demonstrated symptomatic PVCs and short ventricular runs.  She was injected to take propranolol as needed and also wanted to avoid regular daily medications.    Echocardiogram 6/17/2024 demonstrated left ejection fraction 42%, stable compared to prior study of 2020.  The mechanical aortic valve prosthesis was normally functioning with mild prosthetic valve regurgitation and normal valve function.  There is moderate mitral regurgitation.    She presents today for follow-up with no complaints.  She remains active and feels well without exertional symptoms.                Vitals:  Vitals:    07/16/25 0835   BP: 118/66  "  Pulse: 67   Weight: 73.2 kg (161 lb 6.4 oz)   Height: 5' 4\" (1.626 m)         Past Medical History:   Diagnosis Date   • Allergic    • Anxiety    • Aortic valve replaced    • Gallstones    • History of transfusion    • Hyperlipidemia    • Kidney stone    • Nephrolithiasis    • Palpitations    • PONV (postoperative nausea and vomiting)    • Urinary tract infection    • UTI (urinary tract infection) 10/2021     Social History     Socioeconomic History   • Marital status: /Civil Union     Spouse name: Not on file   • Number of children: Not on file   • Years of education: Not on file   • Highest education level: Not on file   Occupational History   • Not on file   Tobacco Use   • Smoking status: Former     Current packs/day: 0.00     Average packs/day: 0.3 packs/day for 5.0 years (1.3 ttl pk-yrs)     Types: Cigarettes     Quit date:      Years since quittin.5   • Smokeless tobacco: Never   Vaping Use   • Vaping status: Never Used   Substance and Sexual Activity   • Alcohol use: Yes     Alcohol/week: 7.0 standard drinks of alcohol     Types: 7 Standard drinks or equivalent per week     Comment: weekends   • Drug use: No   • Sexual activity: Not on file   Other Topics Concern   • Not on file   Social History Narrative   • Not on file     Social Drivers of Health     Financial Resource Strain: Not on file   Food Insecurity: Not on file   Transportation Needs: Not on file   Physical Activity: Not on file   Stress: Not on file   Social Connections: Not on file   Intimate Partner Violence: Not on file   Housing Stability: Not on file      Family History   Problem Relation Name Age of Onset   • Hyperlipidemia Family     • COPD Family     • Diabetes Family       Past Surgical History:   Procedure Laterality Date   • AORTIC VALVE REPLACEMENT  2008    [facility]   • CARDIAC SURGERY     • CARDIAC VALVE REPLACEMENT     • CHOLECYSTECTOMY     • ID LAPAROSCOPY SURG CHOLECYSTECTOMY N/A 11/10/2021    " Procedure: CHOLECYSTECTOMY LAPAROSCOPIC W/ ROBOTICS;  Surgeon: Lorena Ballesteros MD;  Location: [facility];  Service: General       Current Outpatient Medications:   •  ALPRAZolam (XANAX) 0.25 mg tablet, Take 1 tablet (0.25 mg total) by mouth see administration instructions 1/2 tab twice daily as needed for anxiety, Disp: 30 tablet, Rfl: 1  •  escitalopram (LEXAPRO) 5 mg tablet, Take 1 tablet (5 mg total) by mouth daily, Disp: 30 tablet, Rfl: 5  •  rosuvastatin (CRESTOR) 10 MG tablet, TAKE ONE TABLET BY MOUTH EVERY DAY, Disp: 90 tablet, Rfl: 3  •  warfarin (Jantoven) 2 mg tablet, TAKE ONE TO TWO TABLETS BY MOUTH DAILY AS DIRECED BY MD, Disp: 60 tablet, Rfl: 2        Review of Systems:  Review of Systems   Constitutional:  Negative for activity change, fever and unexpected weight change.   HENT:  Negative for facial swelling, nosebleeds and voice change.    Respiratory:  Negative for chest tightness, shortness of breath and wheezing.    Cardiovascular:  Positive for palpitations. Negative for chest pain and leg swelling.   Gastrointestinal:  Negative for abdominal distention.   Genitourinary:  Negative for hematuria.   Musculoskeletal:  Negative for arthralgias.   Skin:  Negative for color change, pallor, rash and wound.   Neurological:  Negative for dizziness, seizures and syncope.   Psychiatric/Behavioral:  Negative for agitation.          Physical Exam:  Physical Exam  Vitals reviewed.   Constitutional:       Appearance: She is well-developed.   HENT:      Head: Normocephalic and atraumatic.     Cardiovascular:      Rate and Rhythm: Normal rate and regular rhythm.      Heart sounds: Normal heart sounds.   Pulmonary:      Effort: Pulmonary effort is normal.      Breath sounds: Normal breath sounds.   Abdominal:      Palpations: Abdomen is soft.     Musculoskeletal:         General: Normal range of motion.      Cervical back: Normal range of motion and neck supple.      Right lower leg: No edema.      Left lower leg:  No edema.     Skin:     General: Skin is warm and dry.     Neurological:      Mental Status: She is alert and oriented to person, place, and time.     Psychiatric:         Behavior: Behavior normal.         Thought Content: Thought content normal.         Judgment: Judgment normal.         This note was completed in part utilizing M-Modal Fluency Direct Software.  Grammatical errors, random word insertions, spelling mistakes, and incomplete sentences can be an occasional consequence of this system secondary to software limitations, ambient noise, and hardware issues.  If you have any questions or concerns about the content, text, or information contained within the body of this dictation, please contact the provider for clarification.

## 2025-07-22 DIAGNOSIS — Z95.2 S/P AVR (AORTIC VALVE REPLACEMENT): ICD-10-CM

## 2025-07-23 ENCOUNTER — TELEPHONE (OUTPATIENT)
Age: 60
End: 2025-07-23

## 2025-07-23 NOTE — TELEPHONE ENCOUNTER
Received call from Radha with patient's dentist Dr. Roque Mendez's office. They need a clearance form and letter for patient's upcoming extraction, graft, and implant scheduled for 8/18/25. Patient is taking coumadin and needs instructions for hold. They are faxing the form, and will need it faxed back to them at [phone]

## 2025-07-24 RX ORDER — WARFARIN SODIUM 2 MG/1
TABLET ORAL
Qty: 60 TABLET | Refills: 5 | Status: SHIPPED | OUTPATIENT
Start: 2025-07-24

## 2025-07-28 ENCOUNTER — ANTICOAG VISIT (OUTPATIENT)
Dept: CARDIOLOGY CLINIC | Facility: CLINIC | Age: 60
End: 2025-07-28
Payer: OTHER GOVERNMENT

## 2025-07-28 DIAGNOSIS — Z95.2 H/O AORTIC VALVE REPLACEMENT: ICD-10-CM

## 2025-07-28 DIAGNOSIS — I35.9 AORTIC VALVE DISEASE: Primary | ICD-10-CM

## 2025-07-28 LAB — INR PPP: 2.9 (ref 0.85–1.19)

## 2025-07-28 PROCEDURE — 93793 ANTICOAG MGMT PT WARFARIN: CPT

## 2025-08-18 ENCOUNTER — ANTICOAG VISIT (OUTPATIENT)
Dept: CARDIOLOGY CLINIC | Facility: CLINIC | Age: 60
End: 2025-08-18

## 2025-08-18 DIAGNOSIS — I35.9 AORTIC VALVE DISEASE: Primary | ICD-10-CM

## 2025-08-18 DIAGNOSIS — Z95.2 H/O AORTIC VALVE REPLACEMENT: ICD-10-CM

## 2025-08-18 LAB — INR PPP: 1.5 (ref 0.85–1.19)

## (undated) DEVICE — SUT MONOCRYL 4-0 PS-2 27 IN Y426H

## (undated) DEVICE — TROCAR: Brand: KII FIOS FIRST ENTRY

## (undated) DEVICE — GLOVE SRG BIOGEL 7

## (undated) DEVICE — TISSUE RETRIEVAL SYSTEM: Brand: INZII RETRIEVAL SYSTEM

## (undated) DEVICE — ALLENTOWN LAP CHOLE APP PACK: Brand: CARDINAL HEALTH

## (undated) DEVICE — GLOVE INDICATOR PI UNDERGLOVE SZ 7 BLUE

## (undated) DEVICE — GLOVE INDICATOR PI UNDERGLOVE SZ 6.5 BLUE

## (undated) DEVICE — TUBING SMOKE EVAC W/FILTRATION DEVICE PLUMEPORT ACTIV

## (undated) DEVICE — TUBE SET SMOKE EVAC PNEUMOCLEAR HIGH FLOW

## (undated) DEVICE — TELFA NON-ADHERENT ABSORBENT DRESSING: Brand: TELFA

## (undated) DEVICE — MEDIUM-LARGE CLIP APPLIER: Brand: ENDOWRIST

## (undated) DEVICE — PLUMEPEN PRO 10FT

## (undated) DEVICE — PMI DISPOSABLE PUNCTURE CLOSURE DEVICE / SUTURE GRASPER: Brand: PMI

## (undated) DEVICE — IRRIG ENDO FLO TUBING

## (undated) DEVICE — INTENDED FOR TISSUE SEPARATION, AND OTHER PROCEDURES THAT REQUIRE A SHARP SURGICAL BLADE TO PUNCTURE OR CUT.: Brand: BARD-PARKER SAFETY BLADES SIZE 15, STERILE

## (undated) DEVICE — COLUMN DRAPE

## (undated) DEVICE — PERMANENT CAUTERY HOOK: Brand: ENDOWRIST

## (undated) DEVICE — GLOVE SRG BIOGEL 6.5

## (undated) DEVICE — MAYO STAND COVER: Brand: CONVERTORS

## (undated) DEVICE — ARM DRAPE

## (undated) DEVICE — SUT PDS II 1 CT-1 27 IN Z347H

## (undated) DEVICE — 3M™ STERI-STRIP™ REINFORCED ADHESIVE SKIN CLOSURES, R1547, 1/2 IN X 4 IN (12 MM X 100 MM), 6 STRIPS/ENVELOPE: Brand: 3M™ STERI-STRIP™

## (undated) DEVICE — PROGRASP FORCEPS: Brand: ENDOWRIST

## (undated) DEVICE — NEEDLE HYPO 22G X 1-1/2 IN

## (undated) DEVICE — 3M™ STERI-STRIP™ COMPOUND BENZOIN TINCTURE 40 BAGS/CARTON 4 CARTONS/CASE C1544: Brand: 3M™ STERI-STRIP™

## (undated) DEVICE — CHLORAPREP HI-LITE 26ML ORANGE

## (undated) DEVICE — 3000CC GUARDIAN II: Brand: GUARDIAN

## (undated) DEVICE — HEM-O-LOK CLIP CARTRIDGE MED/LARGE DA VINCI SI/XI

## (undated) DEVICE — BLADELESS OBTURATOR: Brand: WECK VISTA

## (undated) DEVICE — CANNULA SEAL